# Patient Record
Sex: FEMALE | Race: WHITE | NOT HISPANIC OR LATINO | Employment: FULL TIME | URBAN - NONMETROPOLITAN AREA
[De-identification: names, ages, dates, MRNs, and addresses within clinical notes are randomized per-mention and may not be internally consistent; named-entity substitution may affect disease eponyms.]

---

## 2017-04-28 RX ORDER — CYCLOBENZAPRINE HCL 10 MG
10 TABLET ORAL 2 TIMES DAILY PRN
COMMUNITY
End: 2018-10-25

## 2017-04-28 RX ORDER — PANTOPRAZOLE SODIUM 40 MG/1
40 TABLET, DELAYED RELEASE ORAL DAILY
COMMUNITY

## 2017-04-28 RX ORDER — LOSARTAN POTASSIUM 100 MG/1
100 TABLET ORAL DAILY
COMMUNITY

## 2017-04-28 RX ORDER — ALPRAZOLAM 1 MG/1
1 TABLET ORAL 3 TIMES DAILY PRN
COMMUNITY
End: 2022-05-18

## 2017-04-28 RX ORDER — CARVEDILOL 6.25 MG/1
6.25 TABLET ORAL 2 TIMES DAILY WITH MEALS
COMMUNITY

## 2017-04-28 RX ORDER — ZOLPIDEM TARTRATE 10 MG/1
10 TABLET ORAL NIGHTLY PRN
COMMUNITY

## 2017-04-28 RX ORDER — BUTALBITAL, ACETAMINOPHEN AND CAFFEINE 50; 325; 40 MG/1; MG/1; MG/1
1 TABLET ORAL EVERY 4 HOURS PRN
COMMUNITY
End: 2018-03-22 | Stop reason: ALTCHOICE

## 2017-04-28 RX ORDER — SIMVASTATIN 20 MG
20 TABLET ORAL NIGHTLY
COMMUNITY
End: 2018-10-25

## 2017-04-28 RX ORDER — DULOXETIN HYDROCHLORIDE 60 MG/1
60 CAPSULE, DELAYED RELEASE ORAL DAILY
COMMUNITY
End: 2017-05-01

## 2017-04-28 RX ORDER — MELOXICAM 15 MG/1
15 TABLET ORAL DAILY PRN
Status: ON HOLD | COMMUNITY
End: 2020-10-09

## 2017-04-28 RX ORDER — CETIRIZINE HYDROCHLORIDE 10 MG/1
10 TABLET ORAL DAILY
COMMUNITY

## 2017-05-01 ENCOUNTER — OFFICE VISIT (OUTPATIENT)
Dept: NEUROLOGY | Facility: CLINIC | Age: 42
End: 2017-05-01

## 2017-05-01 VITALS
BODY MASS INDEX: 40.67 KG/M2 | DIASTOLIC BLOOD PRESSURE: 80 MMHG | WEIGHT: 221 LBS | HEART RATE: 72 BPM | SYSTOLIC BLOOD PRESSURE: 120 MMHG | HEIGHT: 62 IN

## 2017-05-01 DIAGNOSIS — G43.119 INTRACTABLE MIGRAINE WITH AURA WITHOUT STATUS MIGRAINOSUS: ICD-10-CM

## 2017-05-01 DIAGNOSIS — Q85.01 NEUROFIBROMATOSIS, PERIPHERAL, NF1 (HCC): ICD-10-CM

## 2017-05-01 DIAGNOSIS — R51.9 CHRONIC DAILY HEADACHE: Primary | ICD-10-CM

## 2017-05-01 PROBLEM — M54.9 CHRONIC BACK PAIN: Status: ACTIVE | Noted: 2017-05-01

## 2017-05-01 PROBLEM — G89.29 CHRONIC BACK PAIN: Status: ACTIVE | Noted: 2017-05-01

## 2017-05-01 PROCEDURE — 99203 OFFICE O/P NEW LOW 30 MIN: CPT | Performed by: PHYSICIAN ASSISTANT

## 2017-05-16 ENCOUNTER — HOSPITAL ENCOUNTER (OUTPATIENT)
Dept: MRI IMAGING | Facility: HOSPITAL | Age: 42
Discharge: HOME | End: 2017-05-16
Admitting: PHYSICIAN ASSISTANT

## 2017-05-16 DIAGNOSIS — R51.9 CHRONIC DAILY HEADACHE: ICD-10-CM

## 2017-05-16 DIAGNOSIS — G43.119 INTRACTABLE MIGRAINE WITH AURA WITHOUT STATUS MIGRAINOSUS: ICD-10-CM

## 2017-05-16 DIAGNOSIS — Q85.01 NEUROFIBROMATOSIS, PERIPHERAL, NF1 (HCC): ICD-10-CM

## 2017-05-16 LAB — CREAT BLDA-MCNC: 0.9 MG/DL (ref 0.6–1.3)

## 2017-05-16 PROCEDURE — 0 GADOBENATE DIMEGLUMINE 529 MG/ML SOLUTION: Performed by: PHYSICIAN ASSISTANT

## 2017-05-16 PROCEDURE — 70553 MRI BRAIN STEM W/O & W/DYE: CPT

## 2017-05-16 PROCEDURE — A9577 INJ MULTIHANCE: HCPCS | Performed by: PHYSICIAN ASSISTANT

## 2017-05-16 PROCEDURE — 82565 ASSAY OF CREATININE: CPT

## 2017-05-16 RX ADMIN — GADOBENATE DIMEGLUMINE 15 ML: 529 INJECTION, SOLUTION INTRAVENOUS at 13:15

## 2017-05-30 ENCOUNTER — OFFICE VISIT (OUTPATIENT)
Dept: NEUROLOGY | Facility: CLINIC | Age: 42
End: 2017-05-30

## 2017-05-30 VITALS
SYSTOLIC BLOOD PRESSURE: 120 MMHG | BODY MASS INDEX: 40.3 KG/M2 | DIASTOLIC BLOOD PRESSURE: 80 MMHG | HEIGHT: 62 IN | WEIGHT: 219 LBS | HEART RATE: 70 BPM

## 2017-05-30 DIAGNOSIS — G43.119 INTRACTABLE MIGRAINE WITH AURA WITHOUT STATUS MIGRAINOSUS: Primary | ICD-10-CM

## 2017-05-30 DIAGNOSIS — C72.30 LOW-GRADE OPTIC PATHWAY GLIOMA (HCC): ICD-10-CM

## 2017-05-30 DIAGNOSIS — Q85.01 NEUROFIBROMATOSIS, PERIPHERAL, NF1 (HCC): ICD-10-CM

## 2017-05-30 DIAGNOSIS — F17.200 CURRENT SMOKER: ICD-10-CM

## 2017-05-30 PROCEDURE — 99214 OFFICE O/P EST MOD 30 MIN: CPT | Performed by: PHYSICIAN ASSISTANT

## 2017-05-30 RX ORDER — TOPIRAMATE 100 MG/1
100 CAPSULE, EXTENDED RELEASE ORAL DAILY
COMMUNITY
End: 2017-05-30 | Stop reason: SDUPTHER

## 2017-05-30 RX ORDER — TOPIRAMATE 100 MG/1
100 CAPSULE, EXTENDED RELEASE ORAL DAILY
Qty: 30 CAPSULE | Refills: 3 | Status: SHIPPED | OUTPATIENT
Start: 2017-05-30 | End: 2017-09-27 | Stop reason: SDUPTHER

## 2017-06-13 ENCOUNTER — TELEPHONE (OUTPATIENT)
Dept: NEUROLOGY | Facility: CLINIC | Age: 42
End: 2017-06-13

## 2017-06-13 NOTE — TELEPHONE ENCOUNTER
I did attempt to call Sandy back but I had to leave a message. She was calling for an update on her Trokendi XR PA.

## 2017-06-14 DIAGNOSIS — Q85.01 NEUROFIBROMATOSIS, PERIPHERAL, NF1 (HCC): ICD-10-CM

## 2017-06-14 DIAGNOSIS — Q85.00 NEUROFIBROMATOSIS (HCC): Primary | ICD-10-CM

## 2017-06-14 NOTE — TELEPHONE ENCOUNTER
I did speak with Sandy and she will come in on Friday to  samples of Trokendi. I did let her know that I will check on the status of her PA. She did voice understanding.

## 2017-08-21 ENCOUNTER — TELEPHONE (OUTPATIENT)
Dept: NEUROLOGY | Facility: CLINIC | Age: 42
End: 2017-08-21

## 2017-08-21 NOTE — TELEPHONE ENCOUNTER
Pt states that her neurologist that she saw in [place] said that she needed a total MRI of the spine done.  She was wondering If that was possible or what yall thought.  Thanks!

## 2017-08-30 ENCOUNTER — HOSPITAL ENCOUNTER (OUTPATIENT)
Dept: MRI IMAGING | Facility: HOSPITAL | Age: 42
Discharge: HOME | End: 2017-08-30
Admitting: PHYSICIAN ASSISTANT

## 2017-08-30 DIAGNOSIS — Q85.01 NEUROFIBROMATOSIS, PERIPHERAL, NF1 (HCC): ICD-10-CM

## 2017-08-30 DIAGNOSIS — C72.30 LOW-GRADE OPTIC PATHWAY GLIOMA (HCC): ICD-10-CM

## 2017-08-30 LAB — CREAT BLDA-MCNC: 0.9 MG/DL (ref 0.6–1.3)

## 2017-08-30 PROCEDURE — 0 GADOBENATE DIMEGLUMINE 529 MG/ML SOLUTION: Performed by: PHYSICIAN ASSISTANT

## 2017-08-30 PROCEDURE — 82565 ASSAY OF CREATININE: CPT

## 2017-08-30 PROCEDURE — 70553 MRI BRAIN STEM W/O & W/DYE: CPT

## 2017-08-30 PROCEDURE — A9577 INJ MULTIHANCE: HCPCS | Performed by: PHYSICIAN ASSISTANT

## 2017-08-30 RX ADMIN — GADOBENATE DIMEGLUMINE 20 ML: 529 INJECTION, SOLUTION INTRAVENOUS at 10:30

## 2017-09-27 DIAGNOSIS — G43.119 INTRACTABLE MIGRAINE WITH AURA WITHOUT STATUS MIGRAINOSUS: ICD-10-CM

## 2017-09-27 RX ORDER — TOPIRAMATE 100 MG/1
100 CAPSULE, EXTENDED RELEASE ORAL DAILY
Qty: 30 CAPSULE | Refills: 3 | Status: SHIPPED | OUTPATIENT
Start: 2017-09-27 | End: 2017-11-27 | Stop reason: SDUPTHER

## 2017-10-18 ENCOUNTER — CLINICAL SUPPORT (OUTPATIENT)
Dept: NEUROLOGY | Facility: CLINIC | Age: 42
End: 2017-10-18

## 2017-10-18 VITALS
HEART RATE: 74 BPM | BODY MASS INDEX: 40.3 KG/M2 | HEIGHT: 62 IN | RESPIRATION RATE: 22 BRPM | WEIGHT: 219 LBS | DIASTOLIC BLOOD PRESSURE: 80 MMHG | SYSTOLIC BLOOD PRESSURE: 120 MMHG

## 2017-10-18 DIAGNOSIS — G43.719 INTRACTABLE CHRONIC MIGRAINE WITHOUT AURA AND WITHOUT STATUS MIGRAINOSUS: Primary | ICD-10-CM

## 2017-10-18 PROCEDURE — 64615 CHEMODENERV MUSC MIGRAINE: CPT | Performed by: PSYCHIATRY & NEUROLOGY

## 2017-10-24 ENCOUNTER — OFFICE VISIT (OUTPATIENT)
Dept: NEUROLOGY | Facility: CLINIC | Age: 42
End: 2017-10-24

## 2017-10-24 VITALS
WEIGHT: 212 LBS | SYSTOLIC BLOOD PRESSURE: 122 MMHG | HEIGHT: 62 IN | HEART RATE: 72 BPM | DIASTOLIC BLOOD PRESSURE: 82 MMHG | BODY MASS INDEX: 39.01 KG/M2

## 2017-10-24 DIAGNOSIS — R51.9 CHRONIC DAILY HEADACHE: ICD-10-CM

## 2017-10-24 DIAGNOSIS — G43.119 INTRACTABLE MIGRAINE WITH AURA WITHOUT STATUS MIGRAINOSUS: Primary | ICD-10-CM

## 2017-10-24 DIAGNOSIS — C72.30 LOW-GRADE OPTIC PATHWAY GLIOMA (HCC): ICD-10-CM

## 2017-10-24 DIAGNOSIS — Q85.01 NEUROFIBROMATOSIS, PERIPHERAL, NF1 (HCC): ICD-10-CM

## 2017-10-24 PROCEDURE — 99214 OFFICE O/P EST MOD 30 MIN: CPT | Performed by: PHYSICIAN ASSISTANT

## 2017-10-24 RX ORDER — DULOXETIN HYDROCHLORIDE 60 MG/1
90 CAPSULE, DELAYED RELEASE ORAL DAILY
COMMUNITY
End: 2021-01-21

## 2017-10-24 RX ORDER — MEMANTINE HYDROCHLORIDE 10 MG/1
10 TABLET ORAL 2 TIMES DAILY
Qty: 60 TABLET | Refills: 3 | Status: SHIPPED | OUTPATIENT
Start: 2017-10-24 | End: 2018-04-02 | Stop reason: SDUPTHER

## 2017-10-29 NOTE — PROGRESS NOTES
Subjective   Sandy Estes is a 42 y.o. female is here today for follow-up.    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  These records and recommendations are reviewed in detail with the patient today.    Migraine    This is a chronic problem. The current episode started more than 1 year ago. The problem occurs intermittently. The problem has been waxing and waning. The pain is located in the bilateral, frontal and retro-orbital region. The pain does not radiate. The pain quality is similar to prior headaches. The quality of the pain is described as aching, throbbing and pulsating. The pain is severe. Associated symptoms include back pain, nausea, phonophobia, photophobia, scalp tenderness, a visual change and vomiting. Pertinent negatives include no coughing, ear pain, fever or weakness. The symptoms are aggravated by activity, bright light, fatigue, emotional stress, noise and Valsalva. She has tried acetaminophen, antidepressants, Excedrin, darkened room and NSAIDs for the symptoms. The treatment provided mild relief. Her past medical history is significant for migraine headaches and migraines in the family.   Headache    This is a chronic problem. The current episode started more than 1 year ago. The problem occurs daily. The problem has been gradually worsening. The pain is located in the bilateral, frontal, parietal, vertex and occipital region. The pain radiates to the left neck and right neck. The pain quality is similar to prior headaches. The quality of the pain is described as aching, stabbing and shooting. The pain is severe. Associated symptoms include back pain, nausea, phonophobia, photophobia, scalp tenderness, a visual change and vomiting. Pertinent negatives include no coughing, ear pain, fever or weakness. The symptoms are aggravated by activity, bright light, fatigue, emotional stress and Valsalva. She has tried acetaminophen,  antidepressants, Excedrin, darkened room, NSAIDs, oxygen, oral narcotics and injectable narcotics for the symptoms. The treatment provided mild relief. Her past medical history is significant for migraine headaches and migraines in the family.       The following portions of the patient's history were reviewed and updated as appropriate: allergies, current medications, past family history, past medical history, past social history, past surgical history and problem list.    Review of Systems   Constitutional: Positive for fatigue. Negative for chills and fever.   HENT: Negative for ear pain, nosebleeds and trouble swallowing.    Eyes: Positive for photophobia and visual disturbance.   Respiratory: Positive for shortness of breath and wheezing. Negative for cough.    Cardiovascular: Negative.    Gastrointestinal: Positive for nausea and vomiting.   Endocrine: Negative.    Genitourinary: Negative.    Musculoskeletal: Positive for back pain and gait problem.   Skin: Negative.         Cutaneous neurofibromas   Allergic/Immunologic: Negative.    Neurological: Positive for headaches. Negative for facial asymmetry, speech difficulty and weakness.   Hematological: Negative.    Psychiatric/Behavioral: Positive for dysphoric mood and sleep disturbance. The patient is nervous/anxious.        Objective   Physical Exam   Constitutional: She is oriented to person, place, and time. Vital signs are normal. She appears well-developed and well-nourished. No distress.   HENT:   Head: Normocephalic and atraumatic.   Right Ear: Hearing and external ear normal.   Left Ear: Hearing and external ear normal.   Nose: Nose normal.   Mouth/Throat: Uvula is midline, oropharynx is clear and moist and mucous membranes are normal.   Eyes: Conjunctivae, EOM and lids are normal. Pupils are equal, round, and reactive to light. No scleral icterus.   Fundoscopic exam:       The right eye shows no hemorrhage and no papilledema. The right eye shows red  reflex.        The left eye shows no hemorrhage and no papilledema. The left eye shows red reflex.   Neck: Normal range of motion and phonation normal. No spinous process tenderness and no muscular tenderness present. Carotid bruit is not present. Normal range of motion present. No thyroid mass and no thyromegaly present.   Cardiovascular: Normal rate, regular rhythm, S1 normal, S2 normal and normal heart sounds.    No murmur heard.  Pulmonary/Chest: Effort normal and breath sounds normal. No stridor. No respiratory distress. She has no wheezes. She has no rales.   Musculoskeletal: She exhibits no edema or tenderness.        Lumbar back: She exhibits decreased range of motion and pain.   Lymphadenopathy:     She has no cervical adenopathy.   Neurological: She is alert and oriented to person, place, and time. She has normal strength and normal reflexes. She displays no atrophy and no tremor. No cranial nerve deficit or sensory deficit. She exhibits normal muscle tone. Coordination and gait normal. GCS eye subscore is 4. GCS verbal subscore is 5. GCS motor subscore is 6.   Reflex Scores:       Tricep reflexes are 2+ on the right side and 2+ on the left side.       Bicep reflexes are 2+ on the right side and 2+ on the left side.       Brachioradialis reflexes are 2+ on the right side and 2+ on the left side.       Patellar reflexes are 2+ on the right side and 2+ on the left side.       Achilles reflexes are 2+ on the right side and 2+ on the left side.  Skin: Skin is warm and dry. No ecchymosis, no lesion and no rash noted. No cyanosis. Nails show no clubbing.   Multiple cutaneous neurofibromas   Psychiatric: She has a normal mood and affect. Her speech is normal and behavior is normal. Judgment and thought content normal. She is not actively hallucinating. Cognition and memory are normal. She is attentive.   Nursing note and vitals reviewed.        Assessment/Plan   Sandy was seen today for migraine.    Diagnoses and  all orders for this visit:    Intractable migraine with aura without status migrainosus  -     memantine (NAMENDA) 10 MG tablet; Take 1 tablet by mouth 2 (Two) Times a Day.    Low-grade optic pathway glioma  -     Ambulatory Referral to Ophthalmology  -     MRI Brain With & Without Contrast; Future  -     MRI Cervical Spine With & Without Contrast; Future    Neurofibromatosis, peripheral, NF1  -     MRI Brain With & Without Contrast; Future  -     MRI Cervical Spine With & Without Contrast; Future    Chronic daily headache    We will add memantine 10 mg twice per day as migraine prophylaxis.    After reviewing the [facility] neurology recommendations as well as the patient's history, we will request an MRI of the brain with and without contrast with thin cuts through the optic chiasm and requests an MRI of the cervical spine with and without contrast.  The patient is also had recommendation for thoracic and lumbar MRI given her history of neurofibromatosis, we will address these MRI orders subsequent to completion of the first two.    We will refer her to ophthalmology and obtain formal visual field testing.    20 minutes of a 25 minute outpatient visit in [facility].        EMR Dragon transcription disclaimer:  Much of this encounter note is an electronic transcription/translation of spoken language to printed text.  The electronic translation of spoken language may permit erroneous, or at times, nonsensical words or phrases to be inadvertently transcribed.  The author has reviewed the note for such errors, however some may still exist.

## 2017-11-17 ENCOUNTER — APPOINTMENT (OUTPATIENT)
Dept: MRI IMAGING | Facility: HOSPITAL | Age: 42
End: 2017-11-17

## 2017-11-22 ENCOUNTER — HOSPITAL ENCOUNTER (OUTPATIENT)
Dept: MRI IMAGING | Facility: HOSPITAL | Age: 42
Discharge: HOME | End: 2017-11-22
Admitting: PHYSICIAN ASSISTANT

## 2017-11-22 ENCOUNTER — HOSPITAL ENCOUNTER (OUTPATIENT)
Dept: MRI IMAGING | Facility: HOSPITAL | Age: 42
Discharge: HOME | End: 2017-11-22

## 2017-11-22 DIAGNOSIS — C72.30 LOW-GRADE OPTIC PATHWAY GLIOMA (HCC): ICD-10-CM

## 2017-11-22 DIAGNOSIS — Q85.01 NEUROFIBROMATOSIS, PERIPHERAL, NF1 (HCC): ICD-10-CM

## 2017-11-22 LAB — CREAT BLDA-MCNC: 1 MG/DL (ref 0.6–1.3)

## 2017-11-22 PROCEDURE — A9577 INJ MULTIHANCE: HCPCS | Performed by: INTERNAL MEDICINE

## 2017-11-22 PROCEDURE — 72156 MRI NECK SPINE W/O & W/DYE: CPT

## 2017-11-22 PROCEDURE — 0 GADOBENATE DIMEGLUMINE 529 MG/ML SOLUTION: Performed by: INTERNAL MEDICINE

## 2017-11-22 PROCEDURE — 82565 ASSAY OF CREATININE: CPT

## 2017-11-22 PROCEDURE — 70553 MRI BRAIN STEM W/O & W/DYE: CPT

## 2017-11-22 RX ADMIN — GADOBENATE DIMEGLUMINE 10 ML: 529 INJECTION, SOLUTION INTRAVENOUS at 13:45

## 2017-11-27 ENCOUNTER — TELEPHONE (OUTPATIENT)
Dept: NEUROLOGY | Facility: CLINIC | Age: 42
End: 2017-11-27

## 2017-11-27 DIAGNOSIS — G43.119 INTRACTABLE MIGRAINE WITH AURA WITHOUT STATUS MIGRAINOSUS: ICD-10-CM

## 2017-11-27 RX ORDER — TOPIRAMATE 100 MG/1
100 CAPSULE, EXTENDED RELEASE ORAL DAILY
Qty: 30 CAPSULE | Refills: 3 | Status: SHIPPED | OUTPATIENT
Start: 2017-11-27 | End: 2017-12-22 | Stop reason: ALTCHOICE

## 2017-11-27 NOTE — TELEPHONE ENCOUNTER
----- Message from Sandy Estes sent at 11/25/2017 11:01 PM CST -----  Regarding: Prescription Question  Contact: [phone]  Need a refill on trokendi XR please     Sandy Estes

## 2017-11-29 ENCOUNTER — TELEPHONE (OUTPATIENT)
Dept: NEUROLOGY | Facility: CLINIC | Age: 42
End: 2017-11-29

## 2017-11-29 NOTE — TELEPHONE ENCOUNTER
I did attempt to return Sandy's call about her Trokendi. I am not sure if she is having a cost issue, but I left a message that I will leave samples up front for her and I left my name and telephone number for her to call at her convenience.

## 2017-12-15 ENCOUNTER — HOSPITAL ENCOUNTER (OUTPATIENT)
Dept: ULTRASOUND IMAGING | Age: 42
Discharge: HOME | End: 2017-12-15
Payer: MEDICAID

## 2017-12-15 ENCOUNTER — HOSPITAL ENCOUNTER (OUTPATIENT)
Dept: WOMENS IMAGING | Age: 42
Discharge: HOME | End: 2017-12-15
Payer: MEDICAID

## 2017-12-15 DIAGNOSIS — N63.20 LEFT BREAST LUMP: ICD-10-CM

## 2017-12-15 PROCEDURE — G0279 TOMOSYNTHESIS, MAMMO: HCPCS

## 2017-12-15 PROCEDURE — 76642 ULTRASOUND BREAST LIMITED: CPT

## 2017-12-22 ENCOUNTER — OFFICE VISIT (OUTPATIENT)
Dept: NEUROLOGY | Facility: CLINIC | Age: 42
End: 2017-12-22

## 2017-12-22 VITALS
DIASTOLIC BLOOD PRESSURE: 80 MMHG | WEIGHT: 216 LBS | HEIGHT: 62 IN | BODY MASS INDEX: 39.75 KG/M2 | HEART RATE: 76 BPM | SYSTOLIC BLOOD PRESSURE: 132 MMHG

## 2017-12-22 DIAGNOSIS — Q85.01 NEUROFIBROMATOSIS, PERIPHERAL, NF1 (HCC): ICD-10-CM

## 2017-12-22 DIAGNOSIS — G43.119 INTRACTABLE MIGRAINE WITH AURA WITHOUT STATUS MIGRAINOSUS: Primary | ICD-10-CM

## 2017-12-22 DIAGNOSIS — C72.30 LOW-GRADE OPTIC PATHWAY GLIOMA (HCC): ICD-10-CM

## 2017-12-22 PROBLEM — H47.42: Status: ACTIVE | Noted: 2017-11-03

## 2017-12-22 PROCEDURE — 99213 OFFICE O/P EST LOW 20 MIN: CPT | Performed by: PHYSICIAN ASSISTANT

## 2017-12-22 RX ORDER — TOPIRAMATE 100 MG/1
100 CAPSULE, EXTENDED RELEASE ORAL DAILY
Qty: 30 CAPSULE | Refills: 11 | Status: SHIPPED | OUTPATIENT
Start: 2017-12-22 | End: 2018-03-22 | Stop reason: DRUGHIGH

## 2017-12-22 RX ORDER — TOPIRAMATE 100 MG/1
CAPSULE, EXTENDED RELEASE ORAL DAILY
COMMUNITY
End: 2017-12-22 | Stop reason: ALTCHOICE

## 2017-12-29 NOTE — PROGRESS NOTES
Subjective   Sandy Estes is a 42 y.o. female is here today for follow-up.    Migraine    This is a chronic problem. The current episode started more than 1 year ago. The problem occurs intermittently. The problem has been waxing and waning. The pain is located in the bilateral, frontal and retro-orbital region. The pain does not radiate. The pain quality is similar to prior headaches. The quality of the pain is described as aching, throbbing and pulsating. The pain is severe. Associated symptoms include back pain, nausea, phonophobia, photophobia, scalp tenderness, a visual change and vomiting. Pertinent negatives include no coughing, ear pain, fever or weakness. The symptoms are aggravated by activity, bright light, fatigue, emotional stress, noise and Valsalva. She has tried acetaminophen, antidepressants, Excedrin, darkened room and NSAIDs for the symptoms. The treatment provided mild relief. Her past medical history is significant for migraine headaches and migraines in the family.   Headache    This is a chronic problem. The current episode started more than 1 year ago. The problem occurs daily. The problem has been gradually worsening. The pain is located in the bilateral, frontal, parietal, vertex and occipital region. The pain radiates to the left neck and right neck. The pain quality is similar to prior headaches. The quality of the pain is described as aching, stabbing and shooting. The pain is severe. Associated symptoms include back pain, nausea, phonophobia, photophobia, scalp tenderness, a visual change and vomiting. Pertinent negatives include no coughing, ear pain, fever or weakness. The symptoms are aggravated by activity, bright light, fatigue, emotional stress and Valsalva. She has tried acetaminophen, antidepressants, Excedrin, darkened room, NSAIDs, oxygen, oral narcotics and injectable narcotics for the symptoms. The treatment provided mild relief. Her past medical history is significant for  migraine headaches and migraines in the family.       The following portions of the patient's history were reviewed and updated as appropriate: allergies, current medications, past family history, past medical history, past social history, past surgical history and problem list.    Review of Systems   Constitutional: Positive for fatigue. Negative for chills and fever.   HENT: Negative for ear pain, nosebleeds and trouble swallowing.    Eyes: Positive for photophobia and visual disturbance.   Respiratory: Positive for shortness of breath and wheezing. Negative for cough.    Cardiovascular: Negative.    Gastrointestinal: Positive for nausea and vomiting.   Endocrine: Negative.    Genitourinary: Negative.    Musculoskeletal: Positive for back pain and gait problem.   Skin: Negative.         Cutaneous neurofibromas   Allergic/Immunologic: Negative.    Neurological: Positive for headaches. Negative for facial asymmetry, speech difficulty and weakness.   Hematological: Negative.    Psychiatric/Behavioral: Positive for dysphoric mood and sleep disturbance. The patient is nervous/anxious.        Objective   Physical Exam   Constitutional: She is oriented to person, place, and time. Vital signs are normal. She appears well-developed and well-nourished. No distress.   HENT:   Head: Normocephalic and atraumatic.   Right Ear: Hearing and external ear normal.   Left Ear: Hearing and external ear normal.   Nose: Nose normal.   Mouth/Throat: Uvula is midline, oropharynx is clear and moist and mucous membranes are normal.   Eyes: Conjunctivae, EOM and lids are normal. Pupils are equal, round, and reactive to light. No scleral icterus.   Fundoscopic exam:       The right eye shows no hemorrhage and no papilledema. The right eye shows red reflex.        The left eye shows no hemorrhage and no papilledema. The left eye shows red reflex.   Neck: Normal range of motion and phonation normal. No spinous process tenderness and no muscular  tenderness present. Carotid bruit is not present. Normal range of motion present. No thyroid mass and no thyromegaly present.   Cardiovascular: Normal rate, regular rhythm, S1 normal, S2 normal and normal heart sounds.    No murmur heard.  Pulmonary/Chest: Effort normal and breath sounds normal. No stridor. No respiratory distress. She has no wheezes. She has no rales.   Musculoskeletal: She exhibits no edema or tenderness.        Lumbar back: She exhibits decreased range of motion and pain.   Lymphadenopathy:     She has no cervical adenopathy.   Neurological: She is alert and oriented to person, place, and time. She has normal strength and normal reflexes. She displays no atrophy and no tremor. No cranial nerve deficit or sensory deficit. She exhibits normal muscle tone. Coordination and gait normal. GCS eye subscore is 4. GCS verbal subscore is 5. GCS motor subscore is 6.   Reflex Scores:       Tricep reflexes are 2+ on the right side and 2+ on the left side.       Bicep reflexes are 2+ on the right side and 2+ on the left side.       Brachioradialis reflexes are 2+ on the right side and 2+ on the left side.       Patellar reflexes are 2+ on the right side and 2+ on the left side.       Achilles reflexes are 2+ on the right side and 2+ on the left side.  Skin: Skin is warm and dry. No ecchymosis, no lesion and no rash noted. No cyanosis. Nails show no clubbing.   Multiple cutaneous neurofibromas   Psychiatric: She has a normal mood and affect. Her speech is normal and behavior is normal. Judgment and thought content normal. She is not actively hallucinating. Cognition and memory are normal. She is attentive.   Nursing note and vitals reviewed.        Assessment/Plan   Sandy was seen today for migraine.    Diagnoses and all orders for this visit:    Intractable migraine with aura without status migrainosus  -     Topiramate ER (TROKENDI XR) 100 MG capsule sustained-release 24 hr; Take 100 mg by mouth  Daily.    Neurofibromatosis, peripheral, NF1    Low-grade optic pathway glioma    The patient has had adverse effects to regular release topiramate, Qudexy extended release topiramate but has relief and no suck side effects brand name Trokendi.  We would like to convert her back to brand name Trokendi which provides relief without adverse effects.    Neurofibromatosis is stable.    The patient is on a scheduled long-term monitoring for low-grade optic pathway glioma associated with her neurofibromatosis.    10 minutes of a 15 minute outpatient visit was spent in counseling and coordination of care today.        Patient's BMI is above normal parameters. Follow-up plan includes:  exercise counseling and nutrition counseling.    EMR Dragon transcription disclaimer:  Much of this encounter note is an electronic transcription/translation of spoken language to printed text.  The electronic translation of spoken language may permit erroneous, or at times, nonsensical words or phrases to be inadvertently transcribed.  The author has reviewed the note for such errors, however some may still exist.

## 2018-01-04 ENCOUNTER — PRIOR AUTHORIZATION (OUTPATIENT)
Dept: NEUROLOGY | Facility: CLINIC | Age: 43
End: 2018-01-04

## 2018-01-10 ENCOUNTER — PROCEDURE VISIT (OUTPATIENT)
Dept: NEUROLOGY | Facility: CLINIC | Age: 43
End: 2018-01-10

## 2018-01-10 DIAGNOSIS — G43.119 INTRACTABLE MIGRAINE WITH AURA WITHOUT STATUS MIGRAINOSUS: Primary | ICD-10-CM

## 2018-01-10 PROCEDURE — 64615 CHEMODENERV MUSC MIGRAINE: CPT | Performed by: PSYCHIATRY & NEUROLOGY

## 2018-01-10 NOTE — PROGRESS NOTES
Procedure Note:  Sandy Estes  01/10/2018    Procedure:  Botulinum Toxin Injection  Indication for Procedure: Chronic Migraines    The risks and benefits were explained to the patient including local infection, mild bleeding, paralysis of muscles, and possible allergic reaction.  The patient expressed understanding and informed consent was obtained.    Initial Headache Frequency: 30  Current Headache Frequency:2    Initial Duration (hours): 24  Current Duration (hours): 6     10 Units divided in 2 sites: 5 Units Right, 5 Units Left  Procerus: 5 Units  Frontalis 20 units divided in 4 sites: 10 Units Right, 10 Units Left  Temporalis 40 Units divided in 8 sites: 20 Units Right, 20 Units Left  Occipitalis 30 Units divided in 6 sites: 15 Units Right, 15 Units Left  Cervical Paraspinal 20 Units divided in 4 sites: 10 Units Right, 10 Units Left  Trapezius 30 Units divided in 6 sites: 15 Units Right, 15 Units Left              200 units were mixed in total with 45 units being discarded.    The patient tolerated the procedure well with no complications and no blood loss.    Follow up for repeat injections in 12 weeks.    Scribed for Freddy Barkley MD by Kim Daley MA. 1/10/2018  1:36 PM    I performed the procedure myself and agree with documentation above.    Freddy Barkley MD  01/10/18

## 2018-01-31 ENCOUNTER — OFFICE VISIT (OUTPATIENT)
Dept: SURGERY | Age: 43
End: 2018-01-31
Payer: MEDICAID

## 2018-01-31 VITALS
HEART RATE: 78 BPM | SYSTOLIC BLOOD PRESSURE: 138 MMHG | BODY MASS INDEX: 39.16 KG/M2 | HEIGHT: 62 IN | WEIGHT: 212.8 LBS | DIASTOLIC BLOOD PRESSURE: 78 MMHG | RESPIRATION RATE: 20 BRPM

## 2018-01-31 DIAGNOSIS — N60.19 FIBROCYSTIC BREAST DISEASE (FCBD), UNSPECIFIED LATERALITY: ICD-10-CM

## 2018-01-31 DIAGNOSIS — Z80.3 FAMILY HISTORY OF BREAST CANCER: Primary | ICD-10-CM

## 2018-01-31 DIAGNOSIS — N63.0 LUMP OR MASS IN BREAST: ICD-10-CM

## 2018-01-31 PROCEDURE — 1036F TOBACCO NON-USER: CPT | Performed by: SURGERY

## 2018-01-31 PROCEDURE — G8417 CALC BMI ABV UP PARAM F/U: HCPCS | Performed by: SURGERY

## 2018-01-31 PROCEDURE — 99203 OFFICE O/P NEW LOW 30 MIN: CPT | Performed by: SURGERY

## 2018-01-31 PROCEDURE — G8428 CUR MEDS NOT DOCUMENT: HCPCS | Performed by: SURGERY

## 2018-01-31 PROCEDURE — G8484 FLU IMMUNIZE NO ADMIN: HCPCS | Performed by: SURGERY

## 2018-01-31 RX ORDER — LOSARTAN POTASSIUM 100 MG/1
100 TABLET ORAL
COMMUNITY

## 2018-01-31 RX ORDER — MEMANTINE HYDROCHLORIDE 10 MG/1
10 TABLET ORAL 2 TIMES DAILY
COMMUNITY

## 2018-01-31 RX ORDER — PANTOPRAZOLE SODIUM 40 MG/1
40 TABLET, DELAYED RELEASE ORAL
COMMUNITY

## 2018-01-31 RX ORDER — ALPRAZOLAM 0.5 MG/1
0.5 TABLET ORAL
COMMUNITY

## 2018-01-31 RX ORDER — DULOXETIN HYDROCHLORIDE 60 MG/1
60 CAPSULE, DELAYED RELEASE ORAL
COMMUNITY

## 2018-01-31 RX ORDER — CARVEDILOL 6.25 MG/1
6.25 TABLET ORAL
COMMUNITY

## 2018-02-01 PROBLEM — N60.19 DIFFUSE CYSTIC MASTOPATHY: Status: ACTIVE | Noted: 2018-02-01

## 2018-02-01 PROBLEM — N63.0 LUMP OR MASS IN BREAST: Status: ACTIVE | Noted: 2018-02-01

## 2018-03-07 DIAGNOSIS — G43.711 INTRACTABLE CHRONIC MIGRAINE WITHOUT AURA AND WITH STATUS MIGRAINOSUS: Primary | ICD-10-CM

## 2018-03-22 ENCOUNTER — OFFICE VISIT (OUTPATIENT)
Dept: NEUROLOGY | Facility: CLINIC | Age: 43
End: 2018-03-22

## 2018-03-22 VITALS
HEIGHT: 62 IN | DIASTOLIC BLOOD PRESSURE: 84 MMHG | WEIGHT: 212 LBS | SYSTOLIC BLOOD PRESSURE: 124 MMHG | HEART RATE: 76 BPM | BODY MASS INDEX: 39.01 KG/M2

## 2018-03-22 DIAGNOSIS — C72.30 LOW-GRADE OPTIC PATHWAY GLIOMA (HCC): ICD-10-CM

## 2018-03-22 DIAGNOSIS — G43.119 INTRACTABLE MIGRAINE WITH AURA WITHOUT STATUS MIGRAINOSUS: Primary | ICD-10-CM

## 2018-03-22 DIAGNOSIS — Q85.01 NEUROFIBROMATOSIS, PERIPHERAL, NF1 (HCC): ICD-10-CM

## 2018-03-22 DIAGNOSIS — R51.9 CHRONIC DAILY HEADACHE: ICD-10-CM

## 2018-03-22 PROCEDURE — 99214 OFFICE O/P EST MOD 30 MIN: CPT | Performed by: PHYSICIAN ASSISTANT

## 2018-03-22 RX ORDER — TOPIRAMATE 200 MG/1
200 CAPSULE, EXTENDED RELEASE ORAL DAILY
Qty: 30 CAPSULE | Refills: 6 | Status: SHIPPED | OUTPATIENT
Start: 2018-03-22 | End: 2018-11-26 | Stop reason: SDUPTHER

## 2018-03-23 NOTE — PROGRESS NOTES
Subjective   Sandy Estes is a 42 y.o. female is here today for follow-up.    Migraine    This is a chronic problem. The current episode started more than 1 year ago. The problem occurs intermittently. The problem has been waxing and waning. The pain is located in the bilateral, frontal and retro-orbital region. The pain does not radiate. The pain quality is similar to prior headaches. The quality of the pain is described as aching, throbbing and pulsating. The pain is severe. Associated symptoms include back pain, dizziness, neck pain, phonophobia, photophobia, scalp tenderness and vomiting. Pertinent negatives include no abdominal pain, coughing, ear pain, fever, loss of balance, nausea, visual change or weakness. The symptoms are aggravated by activity, bright light, fatigue, emotional stress, noise and Valsalva. She has tried acetaminophen, antidepressants, Excedrin, darkened room and NSAIDs for the symptoms. The treatment provided mild relief. Her past medical history is significant for migraine headaches and migraines in the family.   Headache    This is a chronic problem. The current episode started more than 1 year ago. The problem occurs daily. The problem has been gradually worsening. The pain is located in the bilateral, frontal, parietal, vertex and occipital region. The pain radiates to the left neck and right neck. The pain quality is similar to prior headaches. The quality of the pain is described as aching, stabbing and shooting. The pain is severe. Associated symptoms include back pain, dizziness, neck pain, phonophobia, photophobia, scalp tenderness and vomiting. Pertinent negatives include no abdominal pain, coughing, ear pain, fever, loss of balance, nausea, visual change or weakness. The symptoms are aggravated by activity, bright light, fatigue, emotional stress and Valsalva. She has tried acetaminophen, antidepressants, Excedrin, darkened room, NSAIDs, oxygen, oral narcotics and injectable  narcotics for the symptoms. The treatment provided mild relief. Her past medical history is significant for migraine headaches and migraines in the family.       The following portions of the patient's history were reviewed and updated as appropriate: allergies, current medications, past family history, past medical history, past social history, past surgical history and problem list.    Review of Systems   Constitutional: Positive for fatigue. Negative for diaphoresis and fever.   HENT: Negative for congestion, ear pain, nosebleeds and trouble swallowing.    Eyes: Positive for photophobia and visual disturbance.   Respiratory: Positive for wheezing. Negative for cough and shortness of breath.    Cardiovascular: Negative.  Negative for chest pain, palpitations and near-syncope.   Gastrointestinal: Positive for vomiting. Negative for abdominal pain, bowel incontinence and nausea.   Endocrine: Negative.    Genitourinary: Negative.  Negative for bladder incontinence.   Musculoskeletal: Positive for back pain, gait problem and neck pain.   Skin: Negative.         Cutaneous neurofibromas   Allergic/Immunologic: Negative.    Neurological: Positive for dizziness, vertigo and headaches. Negative for focal weakness, syncope, facial asymmetry, weakness, light-headedness and loss of balance.   Hematological: Negative.    Psychiatric/Behavioral: Positive for dysphoric mood and sleep disturbance. Negative for confusion and memory loss. The patient is nervous/anxious.        Objective   Physical Exam   Constitutional: She is oriented to person, place, and time. Vital signs are normal. She appears well-developed and well-nourished. No distress.   HENT:   Head: Normocephalic and atraumatic.   Right Ear: Hearing and external ear normal.   Left Ear: Hearing and external ear normal.   Nose: Nose normal.   Mouth/Throat: Uvula is midline, oropharynx is clear and moist and mucous membranes are normal.   Eyes: Conjunctivae, EOM and lids  are normal. Pupils are equal, round, and reactive to light. No scleral icterus.   Fundoscopic exam:       The right eye shows no hemorrhage and no papilledema. The right eye shows red reflex.        The left eye shows no hemorrhage and no papilledema. The left eye shows red reflex.   Neck: Normal range of motion and phonation normal. No spinous process tenderness and no muscular tenderness present. Carotid bruit is not present. Normal range of motion present. No thyroid mass and no thyromegaly present.   Cardiovascular: Normal rate, regular rhythm, S1 normal, S2 normal and normal heart sounds.    No murmur heard.  Pulmonary/Chest: Effort normal and breath sounds normal. No stridor. No respiratory distress. She has no wheezes. She has no rales.   Musculoskeletal: She exhibits no edema or tenderness.        Lumbar back: She exhibits decreased range of motion and pain.   Lymphadenopathy:     She has no cervical adenopathy.   Neurological: She is alert and oriented to person, place, and time. She has normal strength and normal reflexes. She displays no atrophy and no tremor. No cranial nerve deficit or sensory deficit. She exhibits normal muscle tone. Coordination and gait normal. GCS eye subscore is 4. GCS verbal subscore is 5. GCS motor subscore is 6.   Reflex Scores:       Tricep reflexes are 2+ on the right side and 2+ on the left side.       Bicep reflexes are 2+ on the right side and 2+ on the left side.       Brachioradialis reflexes are 2+ on the right side and 2+ on the left side.       Patellar reflexes are 2+ on the right side and 2+ on the left side.       Achilles reflexes are 2+ on the right side and 2+ on the left side.  Skin: Skin is warm and dry. No ecchymosis, no lesion and no rash noted. No cyanosis. Nails show no clubbing.   Multiple cutaneous neurofibromas   Psychiatric: She has a normal mood and affect. Her speech is normal and behavior is normal. Judgment and thought content normal. She is not  actively hallucinating. Cognition and memory are normal. She is attentive.   Nursing note and vitals reviewed.        Assessment/Plan   Sandy was seen today for migraine.    Diagnoses and all orders for this visit:    Intractable migraine with aura without status migrainosus  -     TROKENDI  MG capsule sustained-release 24 hr; Take 200 mg by mouth Daily.    Low-grade optic pathway glioma    Neurofibromatosis, peripheral, NF1    Chronic daily headache      We will increase Trokendi to 200 mg daily.  The patient had some paradoxical headache after her last Botox injections and will hold these for now.    No other changes were made in her treatment regimen today.    20 minutes of a 25 minute outpatient visit was spent in counseling and coordination of care today.    Dictated utilizing Dragon dictation.

## 2018-04-02 DIAGNOSIS — G43.119 INTRACTABLE MIGRAINE WITH AURA WITHOUT STATUS MIGRAINOSUS: ICD-10-CM

## 2018-04-02 RX ORDER — MEMANTINE HYDROCHLORIDE 10 MG/1
10 TABLET ORAL 2 TIMES DAILY
Qty: 60 TABLET | Refills: 3 | Status: SHIPPED | OUTPATIENT
Start: 2018-04-02 | End: 2018-09-12 | Stop reason: SDUPTHER

## 2018-04-02 NOTE — TELEPHONE ENCOUNTER
----- Message from Sandy Estes sent at 4/2/2018  2:32 PM CDT -----  Regarding: Prescription Question  Contact: [phone]  I was needing a refill on my Namenda please

## 2018-05-15 ENCOUNTER — TELEPHONE (OUTPATIENT)
Dept: NEUROLOGY | Facility: CLINIC | Age: 43
End: 2018-05-15

## 2018-05-15 DIAGNOSIS — G43.119 INTRACTABLE MIGRAINE WITH AURA WITHOUT STATUS MIGRAINOSUS: Primary | ICD-10-CM

## 2018-05-15 DIAGNOSIS — Q85.01 NEUROFIBROMATOSIS, PERIPHERAL, NF1 (HCC): ICD-10-CM

## 2018-05-15 DIAGNOSIS — C72.30 LOW-GRADE OPTIC PATHWAY GLIOMA (HCC): ICD-10-CM

## 2018-05-15 NOTE — TELEPHONE ENCOUNTER
Sandy said she has been seeing flashes of light and she has had some vision changes.  She had her eyes checked and said they didn't find any reason for her vision changes.

## 2018-05-16 RX ORDER — AMLODIPINE BESYLATE 5 MG/1
5 TABLET ORAL DAILY
Qty: 30 TABLET | Refills: 3 | Status: SHIPPED | OUTPATIENT
Start: 2018-05-16 | End: 2018-09-24 | Stop reason: SDUPTHER

## 2018-05-16 NOTE — TELEPHONE ENCOUNTER
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  She also has migraine headaches, and going to send a prescription for Norvasc 5 mg once daily.  I was not able to talk to her on Wednesday night.

## 2018-05-25 ENCOUNTER — HOSPITAL ENCOUNTER (OUTPATIENT)
Dept: MRI IMAGING | Facility: HOSPITAL | Age: 43
Discharge: HOME | End: 2018-05-25
Admitting: PHYSICIAN ASSISTANT

## 2018-05-25 DIAGNOSIS — C72.30 LOW-GRADE OPTIC PATHWAY GLIOMA (HCC): ICD-10-CM

## 2018-05-25 DIAGNOSIS — Q85.01 NEUROFIBROMATOSIS, PERIPHERAL, NF1 (HCC): ICD-10-CM

## 2018-05-25 PROCEDURE — 0 GADOBENATE DIMEGLUMINE 529 MG/ML SOLUTION: Performed by: PHYSICIAN ASSISTANT

## 2018-05-25 PROCEDURE — 82565 ASSAY OF CREATININE: CPT

## 2018-05-25 PROCEDURE — 70553 MRI BRAIN STEM W/O & W/DYE: CPT

## 2018-05-25 PROCEDURE — A9577 INJ MULTIHANCE: HCPCS | Performed by: PHYSICIAN ASSISTANT

## 2018-05-25 RX ADMIN — GADOBENATE DIMEGLUMINE 20 ML: 529 INJECTION, SOLUTION INTRAVENOUS at 10:30

## 2018-05-29 LAB — CREAT BLDA-MCNC: 0.9 MG/DL (ref 0.6–1.3)

## 2018-07-06 ENCOUNTER — OFFICE VISIT (OUTPATIENT)
Dept: SURGERY | Age: 43
End: 2018-07-06
Payer: MEDICAID

## 2018-07-06 VITALS — SYSTOLIC BLOOD PRESSURE: 100 MMHG | DIASTOLIC BLOOD PRESSURE: 60 MMHG | HEART RATE: 74 BPM

## 2018-07-06 DIAGNOSIS — Z12.39 SCREENING BREAST EXAMINATION: Primary | ICD-10-CM

## 2018-07-06 PROCEDURE — G8417 CALC BMI ABV UP PARAM F/U: HCPCS | Performed by: PHYSICIAN ASSISTANT

## 2018-07-06 PROCEDURE — 1036F TOBACCO NON-USER: CPT | Performed by: PHYSICIAN ASSISTANT

## 2018-07-06 PROCEDURE — G8427 DOCREV CUR MEDS BY ELIG CLIN: HCPCS | Performed by: PHYSICIAN ASSISTANT

## 2018-07-06 PROCEDURE — 99212 OFFICE O/P EST SF 10 MIN: CPT | Performed by: PHYSICIAN ASSISTANT

## 2018-07-17 NOTE — PROGRESS NOTES
HPI:  Avelina Hernandez is in for 3 month follow-up breast check for a questionable palpable abnormality. She has not noticed any changes in her breasts. BREAST EXAM:  On examination, she has fibrocystic changes throughout both breasts, no dominant masses, no skin or nipple changes and no axillary adenopathy. I see nothing suspicious for breast cancer. ASSESSMENT:  Normal breast exam     PLAN:  I will plan to see her back in December 2018 for physical exam and bilateral mammograms. She will contact me if anything significant changes.

## 2018-09-12 DIAGNOSIS — G43.119 INTRACTABLE MIGRAINE WITH AURA WITHOUT STATUS MIGRAINOSUS: ICD-10-CM

## 2018-09-12 RX ORDER — MEMANTINE HYDROCHLORIDE 10 MG/1
10 TABLET ORAL 2 TIMES DAILY
Qty: 60 TABLET | Refills: 0 | Status: SHIPPED | OUTPATIENT
Start: 2018-09-12 | End: 2018-10-22 | Stop reason: SDUPTHER

## 2018-09-24 DIAGNOSIS — G43.119 INTRACTABLE MIGRAINE WITH AURA WITHOUT STATUS MIGRAINOSUS: ICD-10-CM

## 2018-09-24 RX ORDER — AMLODIPINE BESYLATE 5 MG/1
TABLET ORAL
Qty: 30 TABLET | Refills: 0 | Status: SHIPPED | OUTPATIENT
Start: 2018-09-24 | End: 2018-10-22 | Stop reason: SDUPTHER

## 2018-10-22 DIAGNOSIS — G43.119 INTRACTABLE MIGRAINE WITH AURA WITHOUT STATUS MIGRAINOSUS: ICD-10-CM

## 2018-10-22 RX ORDER — MEMANTINE HYDROCHLORIDE 10 MG/1
TABLET ORAL
Qty: 60 TABLET | Refills: 0 | Status: SHIPPED | OUTPATIENT
Start: 2018-10-22 | End: 2018-11-26 | Stop reason: SDUPTHER

## 2018-10-22 RX ORDER — AMLODIPINE BESYLATE 5 MG/1
TABLET ORAL
Qty: 30 TABLET | Refills: 0 | Status: SHIPPED | OUTPATIENT
Start: 2018-10-22 | End: 2018-11-26 | Stop reason: SDUPTHER

## 2018-10-25 ENCOUNTER — OFFICE VISIT (OUTPATIENT)
Dept: NEUROLOGY | Facility: CLINIC | Age: 43
End: 2018-10-25

## 2018-10-25 VITALS
SYSTOLIC BLOOD PRESSURE: 112 MMHG | HEART RATE: 61 BPM | WEIGHT: 202 LBS | DIASTOLIC BLOOD PRESSURE: 74 MMHG | BODY MASS INDEX: 37.17 KG/M2 | HEIGHT: 62 IN

## 2018-10-25 DIAGNOSIS — H47.42: ICD-10-CM

## 2018-10-25 DIAGNOSIS — Q85.00 NEUROFIBROMATOSIS (HCC): ICD-10-CM

## 2018-10-25 DIAGNOSIS — G43.119 INTRACTABLE MIGRAINE WITH AURA WITHOUT STATUS MIGRAINOSUS: Primary | ICD-10-CM

## 2018-10-25 PROCEDURE — 99213 OFFICE O/P EST LOW 20 MIN: CPT | Performed by: PHYSICIAN ASSISTANT

## 2018-10-25 RX ORDER — FENOFIBRATE 54 MG/1
1 TABLET ORAL DAILY
COMMUNITY
Start: 2018-09-28 | End: 2023-01-09 | Stop reason: DRUGHIGH

## 2018-11-26 DIAGNOSIS — G43.119 INTRACTABLE MIGRAINE WITH AURA WITHOUT STATUS MIGRAINOSUS: ICD-10-CM

## 2018-11-27 RX ORDER — AMLODIPINE BESYLATE 5 MG/1
TABLET ORAL
Qty: 30 TABLET | Refills: 5 | Status: SHIPPED | OUTPATIENT
Start: 2018-11-27 | End: 2019-05-29 | Stop reason: SDUPTHER

## 2018-11-27 RX ORDER — MEMANTINE HYDROCHLORIDE 10 MG/1
TABLET ORAL
Qty: 60 TABLET | Refills: 5 | Status: SHIPPED | OUTPATIENT
Start: 2018-11-27 | End: 2020-11-06 | Stop reason: SDUPTHER

## 2018-11-27 RX ORDER — TOPIRAMATE 200 MG/1
CAPSULE, EXTENDED RELEASE ORAL
Qty: 30 CAPSULE | Refills: 5 | Status: SHIPPED | OUTPATIENT
Start: 2018-11-27 | End: 2019-05-06 | Stop reason: SDUPTHER

## 2018-12-17 ENCOUNTER — HOSPITAL ENCOUNTER (OUTPATIENT)
Dept: WOMENS IMAGING | Age: 43
Discharge: HOME | End: 2018-12-17
Payer: MEDICAID

## 2018-12-17 ENCOUNTER — OFFICE VISIT (OUTPATIENT)
Dept: SURGERY | Age: 43
End: 2018-12-17
Payer: MEDICAID

## 2018-12-17 VITALS — DIASTOLIC BLOOD PRESSURE: 70 MMHG | SYSTOLIC BLOOD PRESSURE: 100 MMHG | HEART RATE: 72 BPM

## 2018-12-17 DIAGNOSIS — Z12.39 SCREENING BREAST EXAMINATION: ICD-10-CM

## 2018-12-17 DIAGNOSIS — Z12.39 SCREENING BREAST EXAMINATION: Primary | ICD-10-CM

## 2018-12-17 LAB — HEREDITARY CANCER TEST-MYRIAD: NORMAL

## 2018-12-17 PROCEDURE — G8417 CALC BMI ABV UP PARAM F/U: HCPCS | Performed by: PHYSICIAN ASSISTANT

## 2018-12-17 PROCEDURE — G8484 FLU IMMUNIZE NO ADMIN: HCPCS | Performed by: PHYSICIAN ASSISTANT

## 2018-12-17 PROCEDURE — G8427 DOCREV CUR MEDS BY ELIG CLIN: HCPCS | Performed by: PHYSICIAN ASSISTANT

## 2018-12-17 PROCEDURE — 99212 OFFICE O/P EST SF 10 MIN: CPT | Performed by: PHYSICIAN ASSISTANT

## 2018-12-17 PROCEDURE — 1036F TOBACCO NON-USER: CPT | Performed by: PHYSICIAN ASSISTANT

## 2018-12-17 PROCEDURE — 77063 BREAST TOMOSYNTHESIS BI: CPT

## 2019-01-22 ENCOUNTER — TELEPHONE (OUTPATIENT)
Dept: SURGERY | Age: 44
End: 2019-01-22

## 2019-05-06 DIAGNOSIS — G43.119 INTRACTABLE MIGRAINE WITH AURA WITHOUT STATUS MIGRAINOSUS: ICD-10-CM

## 2019-05-06 RX ORDER — TOPIRAMATE 200 MG/1
1 CAPSULE, EXTENDED RELEASE ORAL DAILY
Qty: 30 CAPSULE | Refills: 5 | Status: SHIPPED | OUTPATIENT
Start: 2019-05-06 | End: 2019-11-27 | Stop reason: SDUPTHER

## 2019-05-22 ENCOUNTER — NURSE TRIAGE (OUTPATIENT)
Dept: CALL CENTER | Facility: HOSPITAL | Age: 44
End: 2019-05-22

## 2019-05-22 NOTE — TELEPHONE ENCOUNTER
"Recent dosage changes to cymbalta and rexulti which are causing her to feel jittery and she cannot go to sleep.  There are interactions listed in [facility] with these two drugs.  Advised to change the time of day she is taking these medications and not to take them at the same time.  She plans to take the cymbalta in the AM and rexulti in the PM.    Reason for Disposition  • Caller has medication question, adult has minor symptoms, caller declines triage, and triager answers question    Additional Information  • Negative: Drug overdose and nurse unable to answer question  • Negative: Caller requesting information not related to medicine  • Negative: Caller requesting a prescription for Strep throat and has a positive culture result  • Negative: Rash while taking a medication or within 3 days of stopping it  • Negative: Immunization reaction suspected  • Negative: [1] Asthma and [2] having symptoms of asthma (cough, wheezing, etc)  • Negative: MORE THAN A DOUBLE DOSE of a prescription or over-the-counter (OTC) drug  • Negative: [1] DOUBLE DOSE (an extra dose or lesser amount) of over-the-counter (OTC) drug AND [2] any symptoms (e.g., dizziness, nausea, pain, sleepiness)  • Negative: [1] DOUBLE DOSE (an extra dose or lesser amount) of prescription drug AND [2] any symptoms (e.g., dizziness, nausea, pain, sleepiness)  • Negative: Took another person's prescription drug  • Negative: [1] DOUBLE DOSE (an extra dose or lesser amount) of prescription drug AND [2] NO symptoms (Exception: a double dose of antibiotics)  • Negative: Diabetes drug error or overdose (e.g., insulin or extra dose)  • Negative: [1] Request for URGENT new prescription or refill of \"essential\" medication (i.e., likelihood of harm to patient if not taken) AND [2] triager unable to fill per unit policy  • Negative: [1] Prescription not at pharmacy AND [2] was prescribed today by PCP  • Negative: Pharmacy calling with prescription questions and triager " "unable to answer question  • Negative: Caller has URGENT medication question about med that PCP prescribed and triager unable to answer question  • Negative: Caller has NON-URGENT medication question about med that PCP prescribed and triager unable to answer question  • Negative: Caller requesting a NON-URGENT new prescription or refill and triager unable to refill per unit policy  • Negative: Caller has medication question about med not prescribed by PCP and triager unable to answer question (e.g., compatibility with other med, storage)  • Negative: [1] DOUBLE DOSE (an extra dose or lesser amount) of over-the-counter (OTC) drug AND [2] NO symptoms  • Negative: [1] DOUBLE DOSE (an extra dose or lesser amount) of antibiotic drug AND [2] NO symptoms  • Negative: Caller has medication question only, adult not sick, and triager answers question  • Negative: Caller requesting information about medication during pregnancy; adult is not ill and triager answers question  • Negative: Caller requesting information about medication use with breastfeeding; neither adult nor infant is ill, and triager answers question  • Negative: Caller requesting a refill, no triage required, and triager able to refill per unit policy  • Negative: Pharmacy calling with prescription question and triager answers question    Answer Assessment - Initial Assessment Questions  1. SYMPTOMS: \"Do you have any symptoms?\"      Anxious and insomnia  2. SEVERITY: If symptoms are present, ask \"Are they mild, moderate or severe?\"      moderate    Protocols used: MEDICATION QUESTION CALL-ADULT-      "

## 2019-05-24 ENCOUNTER — NURSE TRIAGE (OUTPATIENT)
Dept: CALL CENTER | Facility: HOSPITAL | Age: 44
End: 2019-05-24

## 2019-05-25 NOTE — TELEPHONE ENCOUNTER
"Reviewed guideline with caller, advises Ms. Estes be seen in ED. Caller agrees to follow care advice.     Reason for Disposition  • [1] Bipolar disorder (manic depression) AND [2] unable to do any of normal activities (e.g., self care, school, work; in comparison to baseline).    Additional Information  • Negative: Patient attempted suicide  • Negative: Patient is threatening suicide now  • Negative: Patient is threatening to kill a specific person  • Negative: [1] Patient is very confused (disoriented, slurred speech) AND [2] no other adult (e.g., friend or family member) available  • Negative: [1] Difficult to awaken or acting very confused (disoriented, slurred speech) AND [2] new onset  • Negative: Drug overdose suspected  • Negative: Sounds like a life-threatening emergency to the triager  • Negative: Alcohol use, abuse or dependence, question or problem related to  • Negative: Drug abuse or dependence, question or problem related to    Answer Assessment - Initial Assessment Questions  1. CONCERN: \"What happened that made you call today?\"      Feeling jittery, anxious  2. BIPOLAR SYMPTOM SCREENING: \"How are you feeling overall, is your bipolar disorder under good control?\"    - ROBERTO SYMPTOMS (e.g., increased energy, decreased sleeping, hyperactivity, grandiosity)     - DEPRESSION SYMPTOMS (e.g., decreased energy, increased sleeping or difficulty sleeping, difficulty concentrating, feelings of sadness, guilt, hopelessness, or worthlessness)      Roberto symptoms  3. RISK OF HARM - SUICIDAL IDEATION:  \"Do you ever have thoughts of hurting or killing yourself?\"  (e.g., yes, no, no but preoccupation with thoughts about death)    - INTENT:  \"Do you have thoughts of hurting or killing yourself right NOW?\" (e.g., yes, no, N/A)    - PLAN: \"Do you have a specific plan for how you would do this?\" (e.g., gun, knife, overdose, no plan, N/A)      no  4. RISK OF HARM - HOMICIDAL IDEATION:  \"Do you ever have thoughts of " "hurting or killing someone else?\"  (e.g., yes, no, no but preoccupation with thoughts about death)    - INTENT:  \"Do you have thoughts of hurting or killing someone right NOW?\" (e.g., yes, no, N/A)    - PLAN: \"Do you have a specific plan for how you would do this?\" (e.g., gun, knife, no plan, N/A)       na  5. FUNCTIONAL IMPAIRMENT: \"How have things been going for you overall in your life? Have you had any more difficulties than usual doing your normal daily activities?\"  (e.g., better, same, worse; self-care, school, work, interactions)      Worse this week  6. SUPPORT: \"Who is with you now?\" \"Who do you live with?\" \"Do you have family or friends nearby who you can talk to?\"       Mom  7. THERAPIST: \"Do you have a counselor or therapist? Name?\"      Yes, Dr. Payan  8. STRESSORS: \"Has there been any new stress or recent changes in your life?\"      Change medications  9. DRUG ABUSE/ALCOHOL: \"Do you drink alcohol or use any illegal drugs?\"       no  10. OTHER: \"Do you have any other health or medical symptoms right now?\" (e.g., fever)        Feels like her heart is beating out of her chest.   11. PREGNANCY: \"Is there any chance you are pregnant?\" \"When was your last menstrual period?\"        no    Protocols used: BIPOLAR DISORDER (MANIC DEPRESSION)-ADULT-AH      "

## 2019-05-27 ENCOUNTER — APPOINTMENT (OUTPATIENT)
Dept: MRI IMAGING | Facility: HOSPITAL | Age: 44
End: 2019-05-27

## 2019-05-29 DIAGNOSIS — G43.119 INTRACTABLE MIGRAINE WITH AURA WITHOUT STATUS MIGRAINOSUS: ICD-10-CM

## 2019-05-30 RX ORDER — AMLODIPINE BESYLATE 5 MG/1
TABLET ORAL
Qty: 30 TABLET | Refills: 0 | Status: SHIPPED | OUTPATIENT
Start: 2019-05-30 | End: 2019-08-07 | Stop reason: SDUPTHER

## 2019-05-31 ENCOUNTER — HOSPITAL ENCOUNTER (OUTPATIENT)
Dept: MRI IMAGING | Facility: HOSPITAL | Age: 44
Discharge: HOME | End: 2019-05-31
Admitting: PHYSICIAN ASSISTANT

## 2019-05-31 ENCOUNTER — OFFICE VISIT (OUTPATIENT)
Dept: NEUROLOGY | Facility: CLINIC | Age: 44
End: 2019-05-31

## 2019-05-31 VITALS
HEIGHT: 62 IN | WEIGHT: 208 LBS | DIASTOLIC BLOOD PRESSURE: 66 MMHG | HEART RATE: 74 BPM | BODY MASS INDEX: 38.28 KG/M2 | SYSTOLIC BLOOD PRESSURE: 110 MMHG | OXYGEN SATURATION: 97 %

## 2019-05-31 DIAGNOSIS — Q85.00 NEUROFIBROMATOSIS (HCC): ICD-10-CM

## 2019-05-31 DIAGNOSIS — G43.109 MIGRAINE WITH AURA AND WITHOUT STATUS MIGRAINOSUS, NOT INTRACTABLE: Primary | ICD-10-CM

## 2019-05-31 DIAGNOSIS — R51.9 CHRONIC DAILY HEADACHE: ICD-10-CM

## 2019-05-31 DIAGNOSIS — H47.42: ICD-10-CM

## 2019-05-31 DIAGNOSIS — C72.30 LOW-GRADE OPTIC PATHWAY GLIOMA (HCC): ICD-10-CM

## 2019-05-31 LAB — CREAT BLDA-MCNC: 1.1 MG/DL (ref 0.6–1.3)

## 2019-05-31 PROCEDURE — 99213 OFFICE O/P EST LOW 20 MIN: CPT | Performed by: PHYSICIAN ASSISTANT

## 2019-05-31 PROCEDURE — 82565 ASSAY OF CREATININE: CPT

## 2019-05-31 PROCEDURE — 0 GADOBENATE DIMEGLUMINE 529 MG/ML SOLUTION: Performed by: PHYSICIAN ASSISTANT

## 2019-05-31 PROCEDURE — 70553 MRI BRAIN STEM W/O & W/DYE: CPT

## 2019-05-31 PROCEDURE — A9577 INJ MULTIHANCE: HCPCS | Performed by: PHYSICIAN ASSISTANT

## 2019-05-31 RX ORDER — GALCANEZUMAB 120 MG/ML
120 INJECTION, SOLUTION SUBCUTANEOUS
Qty: 1 ML | Refills: 3 | OUTPATIENT
Start: 2019-05-31 | End: 2019-06-08 | Stop reason: HOSPADM

## 2019-05-31 RX ORDER — BUSPIRONE HYDROCHLORIDE 15 MG/1
15 TABLET ORAL 3 TIMES DAILY
COMMUNITY
End: 2020-01-09

## 2019-05-31 RX ORDER — GALCANEZUMAB 120 MG/ML
240 INJECTION, SOLUTION SUBCUTANEOUS ONCE
Qty: 2 PEN | Refills: 0 | Status: SHIPPED | OUTPATIENT
Start: 2019-05-31 | End: 2019-05-31

## 2019-05-31 RX ORDER — RIZATRIPTAN BENZOATE 5 MG/1
5 TABLET, ORALLY DISINTEGRATING ORAL ONCE AS NEEDED
Qty: 9 TABLET | Refills: 6 | Status: SHIPPED | OUTPATIENT
Start: 2019-05-31 | End: 2020-07-02

## 2019-05-31 RX ORDER — TRAMADOL HYDROCHLORIDE 50 MG/1
50 TABLET ORAL EVERY 6 HOURS PRN
COMMUNITY
End: 2020-01-09

## 2019-05-31 RX ADMIN — GADOBENATE DIMEGLUMINE 20 ML: 529 INJECTION, SOLUTION INTRAVENOUS at 11:52

## 2019-06-08 ENCOUNTER — HOSPITAL ENCOUNTER (EMERGENCY)
Facility: HOSPITAL | Age: 44
Discharge: HOME | End: 2019-06-08
Admitting: EMERGENCY MEDICINE

## 2019-06-08 VITALS
SYSTOLIC BLOOD PRESSURE: 99 MMHG | HEART RATE: 61 BPM | DIASTOLIC BLOOD PRESSURE: 72 MMHG | OXYGEN SATURATION: 98 % | HEIGHT: 62 IN | WEIGHT: 207 LBS | TEMPERATURE: 97.8 F | BODY MASS INDEX: 38.09 KG/M2 | RESPIRATION RATE: 14 BRPM

## 2019-06-08 DIAGNOSIS — F32.A DEPRESSION, UNSPECIFIED DEPRESSION TYPE: Primary | ICD-10-CM

## 2019-06-08 DIAGNOSIS — Z86.59 HX OF BIPOLAR DISORDER: ICD-10-CM

## 2019-06-08 LAB
AMPHET+METHAMPHET UR QL: NEGATIVE
APAP SERPL-MCNC: <10 MCG/ML (ref 10–30)
BARBITURATES UR QL SCN: NEGATIVE
BENZODIAZ UR QL SCN: POSITIVE
CANNABINOIDS SERPL QL: NEGATIVE
COCAINE UR QL: NEGATIVE
ETHANOL UR QL: <0.01 %
METHADONE UR QL SCN: NEGATIVE
OPIATES UR QL: NEGATIVE
PCP UR QL SCN: NEGATIVE
SALICYLATES SERPL-MCNC: <1 MG/DL (ref 15–30)

## 2019-06-08 PROCEDURE — 80307 DRUG TEST PRSMV CHEM ANLYZR: CPT | Performed by: NURSE PRACTITIONER

## 2019-06-08 PROCEDURE — 99283 EMERGENCY DEPT VISIT LOW MDM: CPT

## 2019-06-08 NOTE — ED PROVIDER NOTES
"Subjective   Patient is a 44-year-old white female presents to the emergency department from home with complaints of suicidal ideations.  Patient does have a history of depression and bipolar disorder.  She states she was seen by her psychiatrist in [place] yesterday and advised him at that time that she was suicidal and pt states that he advised her that she needed to go to facility to be admitted. Pt states that she called back yesterday afternoon and left message but she states that she did not receive return call. Pt states that she has been feeling suicidal for several days and that her plan was to overdose on her prescribed medications. Pt resides with her mother and advised that her mother has been giving her her medications preventing her from overdosing. Mother is with pt today. Pt states that she has been to [facility] as well as [facility] and that \"they have not helped me\" she states that her doctor told her that she \"needed to go somewhere and get medicines figured out\" pt states that she was on rexulti in may and that it seemed to make depression worse. She states that was discontinued about 3 weeks ago         History provided by:  Patient   used: No        Review of Systems   Constitutional: Negative.    HENT: Negative.    Eyes: Negative.    Respiratory: Negative.    Cardiovascular: Negative.    Gastrointestinal: Negative.    Endocrine: Negative.    Genitourinary: Negative.    Musculoskeletal: Negative.    Skin: Negative.    Allergic/Immunologic: Negative.    Neurological: Negative.    Hematological: Negative.    Psychiatric/Behavioral:        Patient is a 44-year-old white female presents to the emergency department from home with complaints of suicidal ideations.  Patient does have a history of depression and bipolar disorder.  She states she was seen by her psychiatrist in [place] yesterday and advised him at that time that she was suicidal and pt states that he " "advised her that she needed to go to facility to be admitted. Pt states that she called back yesterday afternoon and left message but she states that she did not receive return call. Pt states that she has been feeling suicidal for several days and that her plan was to overdose on her prescribed medications. Pt resides with her mother and advised that her mother has been giving her her medications preventing her from overdosing. Mother is with pt today. Pt states that she has [place] [facility] [place] as well as [facility] and that \"they have not helped me\" she states that her doctor told her that she \"needed to go somewhere and get medicines figured out\" pt states that she was on rexulti in may and that it seemed to make depression worse. She states that was discontinued about 3 weeks ago      All other systems reviewed and are negative.      Past Medical History:   Diagnosis Date   • Anxiety    • Depression    • Headache    • Hyperlipidemia    • Hypertension    • Neurofibromatosis (CMS/HCC)     6 months old dx       Allergies   Allergen Reactions   • Amoxil [Amoxicillin]    • Biaxin [Clarithromycin]    • Cefzil [Cefprozil]    • Doxycycline    • Lisinopril    • Penicillin G      Other reaction(s): Unknown   • Penicillins    • Sulfa Antibiotics    • Sulfasalazine      Other reaction(s): Unknown   • Vancomycin    • Zofran [Ondansetron Hcl]    • Other Nausea And Vomiting and Other (See Comments)     Vyibriid, fatigue       Past Surgical History:   Procedure Laterality Date   • GALLBLADDER SURGERY     • HYSTERECTOMY     • OTHER SURGICAL HISTORY      repaired infundibular & valvular PS: res. trivial PS and Mild to Mod. PI (age 8)   • TONSILLECTOMY AND ADENOIDECTOMY         Family History   Problem Relation Age of Onset   • Breast cancer Mother    • Heart disease Father    • Hypertension Father        Social History     Socioeconomic History   • Marital status: Single     Spouse name: Not on file   • Number of " children: Not on file   • Years of education: Not on file   • Highest education level: Not on file   Tobacco Use   • Smoking status: Former Smoker     Types: Cigarettes   • Smokeless tobacco: Never Used   Substance and Sexual Activity   • Alcohol use: No   • Drug use: No   • Sexual activity: Defer       Prior to Admission medications    Medication Sig Start Date End Date Taking? Authorizing Provider   ALPRAZolam (XANAX) 1 MG tablet Take 1 mg by mouth 3 (Three) Times a Day As Needed for Anxiety.    Sadia Bradford MD   amLODIPine (NORVASC) 5 MG tablet TAKE ONE TABLET BY MOUTH EVERY DAY 5/30/19   Ranjeet Mathias PA   busPIRone (BUSPAR) 15 MG tablet Take 15 mg by mouth 3 (Three) Times a Day.    Sadia Bradford MD   carvedilol (COREG) 6.25 MG tablet Take 6.25 mg by mouth 2 (Two) Times a Day With Meals.    Sadia Bradford MD   cetirizine (zyrTEC) 10 MG tablet Take 10 mg by mouth Daily.    Sadia Bradford MD   DULoxetine (CYMBALTA) 60 MG capsule Take 90 mg by mouth Daily.    Sadia Bradford MD   EMGALITY 120 MG/ML prefilled syringe Inject 1 mL under the skin into the appropriate area as directed Every 30 (Thirty) Days. 5/31/19   Ranjeet Mathias PA   fenofibrate (TRICOR) 54 MG tablet Take 1 tablet by mouth Daily. 9/28/18   Sadia Bradford MD   FIBER COMPLETE PO Take  by mouth.    Sadia Bradford MD   losartan (COZAAR) 100 MG tablet Take 100 mg by mouth Daily.    Sadia Bradford MD   meloxicam (MOBIC) 15 MG tablet Take 15 mg by mouth Daily As Needed.    Sadia Bradford MD   memantine (NAMENDA) 10 MG tablet TAKE ONE TABLET BY MOUTH TWICE A DAY 11/27/18   Ranjeet Mathias PA   pantoprazole (PROTONIX) 40 MG EC tablet Take 40 mg by mouth Daily.    Sadia Bradford MD   rizatriptan MLT (MAXALT-MLT) 5 MG disintegrating tablet Take 1 tablet by mouth 1 (One) Time As Needed for Migraine for up to 1 dose. May repeat in 2 hours if needed 5/31/19    "Ranjeet Mathias PA   traMADol (ULTRAM) 50 MG tablet Take 50 mg by mouth Every 6 (Six) Hours As Needed for Moderate Pain .    Provider, MD Sadia   TROKENDI  MG capsule sustained-release 24 hr Take 1 tablet by mouth Daily. 5/6/19   Ranjeet Mathias PA   zolpidem (AMBIEN) 10 MG tablet Take 10 mg by mouth At Night As Needed for Sleep.    Provider, MD Sdaia       BP 99/72   Pulse 61   Temp 97.8 °F (36.6 °C) (Oral)   Resp 14   Ht 157.5 cm (62\")   Wt 93.9 kg (207 lb)   SpO2 98%   BMI 37.86 kg/m²     Objective   Physical Exam   Constitutional: She is oriented to person, place, and time. She appears well-developed and well-nourished.   HENT:   Head: Normocephalic and atraumatic.   Eyes: Conjunctivae and EOM are normal. Pupils are equal, round, and reactive to light.   Neck: Normal range of motion. Neck supple. No tracheal deviation present. No thyromegaly present.   Cardiovascular: Normal rate, regular rhythm, normal heart sounds and intact distal pulses.   Pulmonary/Chest: Effort normal and breath sounds normal. No respiratory distress. She has no wheezes. She has no rales. She exhibits no tenderness.   Abdominal: Soft. Bowel sounds are normal.   Musculoskeletal: Normal range of motion.   Neurological: She is alert and oriented to person, place, and time. She has normal reflexes. No cranial nerve deficit.   Skin: Skin is warm and dry.   Psychiatric: She has a normal mood and affect. Her behavior is normal. Judgment and thought content normal.   Nursing note and vitals reviewed.      Procedures         Lab Results (last 24 hours)     Procedure Component Value Units Date/Time    Urine Drug Screen - Urine, Clean Catch [[id number]]  (Abnormal) Collected:  06/08/19 1425    Specimen:  Urine, Clean Catch Updated:  06/08/19 1544     Amphetamine Screen, Urine Negative     Barbiturates Screen, Urine Negative     Benzodiazepine Screen, Urine Positive     Cocaine Screen, Urine Negative     " "Methadone Screen, Urine Negative     Opiate Screen Negative     Phencyclidine (PCP), Urine Negative     THC, Screen, Urine Negative    Narrative:       Negative Thresholds For Drugs Screened in Urine:    Amphetamines          500 ng/ml  Barbiturates          200 ng/ml  Benzodiazepines       200 ng/ml  Cocaine               150 ng/ml  Methadone             150 ng/ml  Opiates               300 ng/ml  Phencyclidine         25 ng/ml  THC                      50 ng/ml    The normal value for all drugs tested is negative. This report includes final unconfirmed screening results.  A positive result by this assay can be, at your request, sent to the Reference Lab for confirmation by gas chromatography. Unconfirmed results must not be used for non-medical purposes, such as employment or legal testing. Clinical consideration should be applied to any drug of abuse test result, particularly when unconfirmed results are used.    Ethanol [[id number]]  (Normal) Collected:  06/08/19 1434    Specimen:  Blood Updated:  06/08/19 1451     Ethanol % <0.010 %     Narrative:       Not for legal purposes. Chain of Custody not followed.     Acetaminophen Level [[id number]]  (Abnormal) Collected:  06/08/19 1434    Specimen:  Blood Updated:  06/08/19 1459     Acetaminophen <10.0 mcg/mL     Salicylate Level [[id number]]  (Abnormal) Collected:  06/08/19 1434    Specimen:  Blood Updated:  06/08/19 1459     Salicylate <1.0 mg/dL           No orders to display       ED Course  ED Course as of Jun 08 2129   Sat Jun 08, 2019   1852 [facility] has eval pt in the er. They do feel that pt is able to be discharged home with mother. Pt lives with mother and she is administering medications at this time. Evidently pt has just started counseling within the last few weeks but she states that she did not go last week as sched because she thought \"I would be admitted to the hospital so I cancelled.\" pt was provided a safety sheet and signed agreement with [facility] " [facility] counselor and mother. Pt will be discharged home soon in stable condition to follow up with her doctor on Monday. Advised to return before if symptoms worsen.   [CW]      ED Course User Index  [CW] Cynthia Gusman, KEI          MDM  Number of Diagnoses or Management Options  Depression, unspecified depression type: minor  Hx of bipolar disorder: minor     Amount and/or Complexity of Data Reviewed  Clinical lab tests: ordered and reviewed    Patient Progress  Patient progress: stable      Final diagnoses:   Depression, unspecified depression type   Hx of bipolar disorder          Cynthia Gusman, KEI  06/08/19 5508

## 2019-06-09 NOTE — DISCHARGE INSTRUCTIONS
Return to ER if symptoms worsen       Depression Screening  Depression screening is a tool that your health care provider can use to learn if you have symptoms of depression. Depression is a common condition with many symptoms that are also often found in other conditions. Depression is treatable, but it must first be diagnosed. You may not know that certain feelings, thoughts, and behaviors that you are having can be symptoms of depression. Taking a depression screening test can help you and your health care provider decide if you need more assessment, or if you should be referred to a mental health care provider.  What are the screening tests?  · You may have a physical exam to see if another condition is affecting your mental health. You may have a blood or urine sample taken during the physical exam.  · You may be interviewed using a screening tool that was developed from research, such as one of these:  ? Patient Health Questionnaire (PHQ). This is a set of either 2 or 9 questions. A health care provider who has been trained to score this screening test uses a guide to assess if your symptoms suggest that you may have depression.  ? [place] Depression Rating Scale (HAM-D). This is a set of either 17 or 24 questions. You may be asked to take it again during or after your treatment, to see if your depression has gotten better.  ? Rajan Depression Inventory (BDI). This is a set of 21 multiple choice questions. Your health care provider scores your answers to assess:  § Your level of depression, ranging from mild to severe.  § Your response to treatment.  · Your health care provider may talk with you about your daily activities, such as eating, sleeping, work, and recreation, and ask if you have had any changes in activity.  · Your health care provider may ask you to see a mental health specialist, such as a psychiatrist or psychologist, for more evaluation.  Who should be screened for depression?  · All adults,  including adults with a family history of a mental health disorder.  · Adolescents who are 12-18 years old.  · People who are recovering from a myocardial infarction (MI).  · Pregnant women, or women who have given birth.  · People who have a long-term (chronic) illness.  · Anyone who has been diagnosed with another type of a mental health disorder.  · Anyone who has symptoms that could show depression.  What do my results mean?  Your health care provider will review the results of your depression screening, physical exam, and lab tests. Positive screens suggest that you may have depression. Screening is the first step in getting the care that you may need. It is up to you to get your screening results. Ask your health care provider, or the department that is doing your screening tests, when your results will be ready. Talk with your health care provider about your results and diagnosis.  A diagnosis of depression is made using the Diagnostic and Statistical Manual of Mental Disorders (DSM-V). This is a book that lists the number and type of symptoms that must be present for a health care provider to give a specific diagnosis.   Your health care provider may work with you to treat your symptoms of depression, or your health care provider may help you find a mental health provider who can assess, diagnose, and treat your depression.  Get help right away if:  · You have thoughts about hurting yourself or others.  If you ever feel like you may hurt yourself or others, or have thoughts about taking your own life, get help right away. You can go to your nearest emergency department or call:  · Your local emergency services (911 in the [place]  · A suicide crisis helpline, such as the National Suicide Prevention Lifeline at 1-598.778.1034. This is open 24 hours a day.    Summary  · Depression screening is the first step in getting the help that you may need.  · If your screening test shows symptoms of depression (is  positive), your health care provider may ask you to see a mental health provider.  · Anyone who is age 12 or older should be screened for depression.  This information is not intended to replace advice given to you by your health care provider. Make sure you discuss any questions you have with your health care provider.  Document Released: 05/04/2018 Document Revised: 05/04/2018 Document Reviewed: 05/04/2018  [employer] Interactive Patient Education © 2019 [employer]      Major Depressive Disorder, Adult  Major depressive disorder (MDD) is a mental health condition. MDD often makes you feel sad, hopeless, or helpless. MDD can also cause symptoms in your body. MDD can affect your:  · Work.  · School.  · Relationships.  · Other normal activities.    MDD can range from mild to very bad. It may occur once (single episode MDD). It can also occur many times (recurrent MDD).  The main symptoms of MDD often include:  · Feeling sad, depressed, or irritable most of the time.  · Loss of interest.    MDD symptoms also include:  · Sleeping too much or too little.  · Eating too much or too little.  · A change in your weight.  · Feeling tired (fatigue) or having low energy.  · Feeling worthless.  · Feeling guilty.  · Trouble making decisions.  · Trouble thinking clearly.  · Thoughts of suicide or harming others.  · Feeling weak.  · Feeling agitated.  · Keeping yourself from being around other people (isolation).    Follow these instructions at home:  Activity  · Do these things as told by your doctor:  ? Go back to your normal activities.  ? Exercise regularly.  ? Spend time outdoors.  Alcohol  · Talk with your doctor about how alcohol can affect your antidepressant medicines.  · Do not drink alcohol. Or, limit how much alcohol you drink.  ? This means no more than 1 drink a day for nonpregnant women and 2 drinks a day for men. One drink equals one of these:  § 12 oz of beer.  § 5 oz of wine.  § 1½ oz of hard liquor.  General  instructions  · Take over-the-counter and prescription medicines only as told by your doctor.  · Eat a healthy diet.  · Get plenty of sleep.  · Find activities that you enjoy. Make time to do them.  · Think about joining a support group. Your doctor may be able to suggest a group for you.  · Keep all follow-up visits as told by your doctor. This is important.  Where to find more information:  · National [place] on Mental Illness:  ? www.asha.org  · [place] National [place] of [place] Health:  ? www.[facility].nih.gov  · National Suicide Prevention Lifeline:  ? 1-559.742.8422. This is free, 24-hour help.  Contact a doctor if:  · Your symptoms get worse.  · You have new symptoms.  Get help right away if:  · You self-harm.  · You see, hear, taste, smell, or feel things that are not present (hallucinate).  If you ever feel like you may hurt yourself or others, or have thoughts about taking your own life, get help right away. You can go to your nearest emergency department or call:  · Your local emergency services (911 in the [place]  · A suicide crisis helpline, such as the National Suicide Prevention Lifeline:  ? 1-639.490.8914. This is open 24 hours a day.    This information is not intended to replace advice given to you by your health care provider. Make sure you discuss any questions you have with your health care provider.  Document Released: 11/28/2016 Document Revised: 09/03/2017 Document Reviewed: 09/03/2017  [employer] Interactive Patient Education © 2019 [employer]      Living With Depression  Everyone experiences occasional disappointment, sadness, and loss in their lives. When you are feeling down, blue, or sad for at least 2 weeks in a row, it may mean that you have depression. Depression can affect your thoughts and feelings, relationships, daily activities, and physical health. It is caused by changes in the way your brain functions. If you receive a diagnosis of depression, your health care provider will tell  you which type of depression you have and what treatment options are available to you.  If you are living with depression, there are ways to help you recover from it and also ways to prevent it from coming back.  How to cope with lifestyle changes  Coping with stress  Stress is your body’s reaction to life changes and events, both good and bad. Stressful situations may include:  · Getting .  · The death of a spouse.  · Losing a job.  · Retiring.  · Having a baby.    Stress can last just a few hours or it can be ongoing. Stress can play a major role in depression, so it is important to learn both how to cope with stress and how to think about it differently.  Talk with your health care provider or a counselor if you would like to learn more about stress reduction. He or she may suggest some stress reduction techniques, such as:  · Music therapy. This can include creating music or listening to music. Choose music that you enjoy and that inspires you.  · Mindfulness-based meditation. This kind of meditation can be done while sitting or walking. It involves being aware of your normal breaths, rather than trying to control your breathing.  · Centering prayer. This is a kind of meditation that involves focusing on a spiritual word or phrase. Choose a word, phrase, or sacred image that is meaningful to you and that brings you peace.  · Deep breathing. To do this, expand your stomach and inhale slowly through your nose. Hold your breath for 3-5 seconds, then exhale slowly, allowing your stomach muscles to relax.  · Muscle relaxation. This involves intentionally tensing muscles then relaxing them.    Choose a stress reduction technique that fits your lifestyle and personality. Stress reduction techniques take time and practice to develop. Set aside 5-15 minutes a day to do them. Therapists can offer training in these techniques. The training may be covered by some insurance plans. Other things you can do to manage  stress include:  · Keeping a stress diary. This can help you learn what triggers your stress and ways to control your response.  · Understanding what your limits are and saying no to requests or events that lead to a schedule that is too full.  · Thinking about how you respond to certain situations. You may not be able to control everything, but you can control how you react.  · Adding humor to your life by watching funny films or TV shows.  · Making time for activities that help you relax and not feeling guilty about spending your time this way.    Medicines  Your health care provider may suggest certain medicines if he or she feels that they will help improve your condition. Avoid using alcohol and other substances that may prevent your medicines from working properly (may interact). It is also important to:  · Talk with your pharmacist or health care provider about all the medicines that you take, their possible side effects, and what medicines are safe to take together.  · Make it your goal to take part in all treatment decisions (shared decision-making). This includes giving input on the side effects of medicines. It is best if shared decision-making with your health care provider is part of your total treatment plan.    If your health care provider prescribes a medicine, you may not notice the full benefits of it for 4-8 weeks. Most people who are treated for depression need to be on medicine for at least 6-12 months after they feel better. If you are taking medicines as part of your treatment, do not stop taking medicines without first talking to your health care provider. You may need to have the medicine slowly decreased (tapered) over time to decrease the risk of harmful side effects.  Relationships  Your health care provider may suggest family therapy along with individual therapy and drug therapy. While there may not be family problems that are causing you to feel depressed, it is still important to make  sure your family learns as much as they can about your mental health. Having your family’s support can help make your treatment successful.  How to recognize changes in your condition  Everyone has a different response to treatment for depression. Recovery from major depression happens when you have not had signs of major depression for two months. This may mean that you will start to:  · Have more interest in doing activities.  · Feel less hopeless than you did 2 months ago.  · Have more energy.  · Overeat less often, or have better or improving appetite.  · Have better concentration.    Your health care provider will work with you to decide the next steps in your recovery. It is also important to recognize when your condition is getting worse. Watch for these signs:  · Having fatigue or low energy.  · Eating too much or too little.  · Sleeping too much or too little.  · Feeling restless, agitated, or hopeless.  · Having trouble concentrating or making decisions.  · Having unexplained physical complaints.  · Feeling irritable, angry, or aggressive.    Get help as soon as you or your family members notice these symptoms coming back.  How to get support and help from others  How to talk with friends and family members about your condition  Talking to friends and family members about your condition can provide you with one way to get support and guidance. Reach out to trusted friends or family members, explain your symptoms to them, and let them know that you are working with a health care provider to treat your depression.  Financial resources  Not all insurance plans cover mental health care, so it is important to check with your insurance carrier. If paying for co-pays or counseling services is a problem, search for a local or [facility] mental health care center. They may be able to offer public mental health care services at low or no cost when you are not able to see a private health care provider.  If you are  taking medicine for depression, you may be able to get the generic form, which may be less expensive. Some makers of prescription medicines also offer help to patients who cannot afford the medicines they need.  Follow these instructions at home:  · Get the right amount and quality of sleep.  · Cut down on using caffeine, tobacco, alcohol, and other potentially harmful substances.  · Try to exercise, such as walking or lifting small weights.  · Take over-the-counter and prescription medicines only as told by your health care provider.  · Eat a healthy diet that includes plenty of vegetables, fruits, whole grains, low-fat dairy products, and lean protein. Do not eat a lot of foods that are high in solid fats, added sugars, or salt.  · Keep all follow-up visits as told by your health care provider. This is important.  Contact a health care provider if:  · You stop taking your antidepressant medicines, and you have any of these symptoms:  ? Nausea.  ? Headache.  ? Feeling lightheaded.  ? Chills and body aches.  ? Not being able to sleep (insomnia).  · You or your friends and family think your depression is getting worse.  Get help right away if:  · You have thoughts of hurting yourself or others.  If you ever feel like you may hurt yourself or others, or have thoughts about taking your own life, get help right away. You can go to your nearest emergency department or call:  · Your local emergency services (911 in the [place]  · A suicide crisis helpline, such as the National Suicide Prevention Lifeline at 1-751.589.4116. This is open 24-hours a day.    Summary  · If you are living with depression, there are ways to help you recover from it and also ways to prevent it from coming back.  · Work with your health care team to create a management plan that includes counseling, stress management techniques, and healthy lifestyle habits.  This information is not intended to replace advice given to you by your health care  provider. Make sure you discuss any questions you have with your health care provider.  Document Released: 11/20/2017 Document Revised: 11/20/2017 Document Reviewed: 11/20/2017  [employer] Interactive Patient Education © 2019 [employer]    Helping Someone Who is Suicidal  Suicide is when someone takes his or her own life. Someone who is thinking about suicide needs immediate help. Although you might not know what to say or do to help, start by letting that person know you care. Listen to him or her. Then talk about how to get help. Help is available through therapy, medicine, and other treatments.  What are signs that someone is suicidal?  Common signs include:  · Signs of depression, such as:  ? Rage.  ? Irritability.  ? Shame.  ? Excessive worry.  ? Loss of interest in things the person once enjoyed.  · Changes in social behaviors and relationships, including:  ? Isolating oneself.  ? Withdrawing from friends and family.  ? Giving away possessions.  ? Saying good-bye.  ? Acting aggressively.  ? Sleeping more or less than usual.  ? Having trouble managing school or work.  ? Talking about feeling hopeless or being a burden.  ? Engaging in risky behaviors, such as drinking more alcohol or using more drugs.    What are the risk factors for suicide?  Risk factors for suicide include:  · Other suicides in the family.  · A history of suicide attempts.  · Depression or other mental health issues.  · Being in [facility] or facing [facility] time.  · Having had close friends who have committed suicide.  · Alcohol or drug abuse, especially combined with a mental illness.    What should I do if someone is suicidal?  If you believe a person is in immediate danger of committing suicide, call your local emergency services (911 in the [place] for help.  If a person says he or she wants to commit suicide, take the threat seriously. Help the person get help right away by:  · Calling your local emergency services.  · Calling a suicide prevention  hotline.  · Contacting a crisis center or a local suicide prevention center. These are often located at hospitals, clinics, community service organizations, social service providers, or health departments.    If a person confides in you that he or she is considering suicide:  · Listen to the person's thoughts and concerns with compassion.  · Let the person know you will stay with him or her.  · Ask if the person is having thoughts of hurting himself or herself.  · Offer to help the person get to a doctor or mental health professional.  · Remove all weapons and medicines from the person's living space.  · Do not promise to keep his or her thoughts of suicide a secret.    This information is not intended to replace advice given to you by your health care provider. Make sure you discuss any questions you have with your health care provider.  Document Released: 06/23/2004 Document Revised: 05/25/2017 Document Reviewed: 05/28/2015  [employer] Interactive Patient Education © 2018 [employer]

## 2019-06-12 RX ORDER — LORAZEPAM 1 MG/1
TABLET ORAL
Qty: 3 TABLET | Refills: 0 | OUTPATIENT
Start: 2019-06-12 | End: 2020-01-09

## 2019-06-15 NOTE — PROGRESS NOTES
Subjective   Sandy Estes is a 44 y.o. female is here today for follow-up.    Headache    This is a chronic problem. The current episode started more than 1 year ago. The problem occurs daily. The problem has been gradually worsening. The pain is located in the bilateral, frontal, parietal, vertex and occipital region. The pain radiates to the left neck and right neck. The pain quality is similar to prior headaches. The quality of the pain is described as aching, stabbing and shooting. The pain is severe. Associated symptoms include back pain, dizziness, neck pain, phonophobia, photophobia, scalp tenderness and vomiting. Pertinent negatives include no weakness. The symptoms are aggravated by activity, bright light, fatigue, emotional stress and Valsalva. She has tried acetaminophen, antidepressants, Excedrin, darkened room, NSAIDs, oxygen, oral narcotics and injectable narcotics for the symptoms. The treatment provided moderate relief. Her past medical history is significant for migraine headaches and migraines in the family.   Migraine    This is a chronic problem. The current episode started more than 1 year ago. The problem occurs intermittently. The problem has been waxing and waning. The pain is located in the bilateral, frontal and retro-orbital region. The pain does not radiate. The pain quality is similar to prior headaches. The quality of the pain is described as aching, throbbing and pulsating. The pain is severe. Associated symptoms include back pain, dizziness, neck pain, phonophobia, photophobia, scalp tenderness and vomiting. Pertinent negatives include no weakness. The symptoms are aggravated by activity, bright light, fatigue, emotional stress, noise and Valsalva. She has tried acetaminophen, antidepressants, Excedrin, darkened room and NSAIDs for the symptoms. The treatment provided moderate relief. Her past medical history is significant for migraine headaches and migraines in the family.        The following portions of the patient's history were reviewed and updated as appropriate: allergies, current medications, past family history, past medical history, past social history, past surgical history and problem list.    Review of Systems   Constitutional: Positive for fatigue.   Eyes: Positive for photophobia and visual disturbance.   Respiratory: Negative.    Cardiovascular: Negative.    Gastrointestinal: Positive for vomiting.   Endocrine: Negative.    Genitourinary: Negative.    Musculoskeletal: Positive for back pain, gait problem and neck pain.   Skin: Negative.         Cutaneous neurofibromas   Allergic/Immunologic: Negative.    Neurological: Positive for dizziness and headaches. Negative for syncope and weakness.   Hematological: Negative.    Psychiatric/Behavioral: Positive for dysphoric mood and sleep disturbance. The patient is nervous/anxious.        Objective   Physical Exam   Constitutional: She is oriented to person, place, and time. Vital signs are normal. She appears well-developed and well-nourished. No distress.   HENT:   Head: Normocephalic and atraumatic.   Right Ear: Hearing and external ear normal.   Left Ear: Hearing and external ear normal.   Nose: Nose normal.   Mouth/Throat: Uvula is midline, oropharynx is clear and moist and mucous membranes are normal.   Eyes: Conjunctivae, EOM and lids are normal. Pupils are equal, round, and reactive to light. No scleral icterus.   Fundoscopic exam:       The right eye shows no hemorrhage and no papilledema. The right eye shows red reflex.        The left eye shows no hemorrhage and no papilledema. The left eye shows red reflex.   Neck: Trachea normal, normal range of motion and phonation normal. No JVD present. Carotid bruit is not present.   Cardiovascular: Normal rate, regular rhythm and normal heart sounds.   No murmur heard.  Pulmonary/Chest: Effort normal and breath sounds normal.   Musculoskeletal: She exhibits no edema or  tenderness.        Lumbar back: She exhibits decreased range of motion and pain.   Lymphadenopathy:     She has no cervical adenopathy.   Neurological: She is alert and oriented to person, place, and time. She has normal strength and normal reflexes. She displays no atrophy and no tremor. No cranial nerve deficit or sensory deficit. She exhibits normal muscle tone. Coordination and gait normal. GCS eye subscore is 4. GCS verbal subscore is 5. GCS motor subscore is 6.   Reflex Scores:       Tricep reflexes are 2+ on the right side and 2+ on the left side.       Bicep reflexes are 2+ on the right side and 2+ on the left side.       Brachioradialis reflexes are 2+ on the right side and 2+ on the left side.       Patellar reflexes are 2+ on the right side and 2+ on the left side.       Achilles reflexes are 2+ on the right side and 2+ on the left side.  Skin: Skin is warm, dry and intact. Lesion noted.   Multiple cutaneous neurofibromas   Psychiatric: She has a normal mood and affect. Her speech is normal and behavior is normal. Judgment and thought content normal. Cognition and memory are normal.   Nursing note and vitals reviewed.        Assessment/Plan   Sandy was seen today for headache and migraine.    Diagnoses and all orders for this visit:    Migraine with aura and without status migrainosus, not intractable  -     rizatriptan MLT (MAXALT-MLT) 5 MG disintegrating tablet; Take 1 tablet by mouth 1 (One) Time As Needed for Migraine for up to 1 dose. May repeat in 2 hours if needed  -     EMGALITY 120 MG/ML prefilled syringe; Inject 2 mL under the skin into the appropriate area as directed 1 (One) Time for 1 dose.  -     Discontinue: EMGALITY 120 MG/ML prefilled syringe; Inject 1 mL under the skin into the appropriate area as directed Every 30 (Thirty) Days.    Low-grade optic pathway glioma (CMS/HCC)    Chronic daily headache    Neurofibromatosis (CMS/HCC)    Findings and follow up recommendations reviewed.      10  minutes of a 15 minute visit was spent in counseling and coordination of care.

## 2019-06-25 ENCOUNTER — OFFICE VISIT (OUTPATIENT)
Dept: OBGYN | Age: 44
End: 2019-06-25
Payer: MEDICAID

## 2019-06-25 VITALS
BODY MASS INDEX: 40.22 KG/M2 | SYSTOLIC BLOOD PRESSURE: 90 MMHG | DIASTOLIC BLOOD PRESSURE: 60 MMHG | WEIGHT: 213 LBS | HEIGHT: 61 IN

## 2019-06-25 DIAGNOSIS — Z12.72 SCREENING FOR VAGINAL CANCER: ICD-10-CM

## 2019-06-25 DIAGNOSIS — Z01.419 WELL FEMALE EXAM WITH ROUTINE GYNECOLOGICAL EXAM: Primary | ICD-10-CM

## 2019-06-25 DIAGNOSIS — Z11.51 SCREENING FOR HPV (HUMAN PAPILLOMAVIRUS): ICD-10-CM

## 2019-06-25 PROCEDURE — 99396 PREV VISIT EST AGE 40-64: CPT | Performed by: NURSE PRACTITIONER

## 2019-06-25 SDOH — HEALTH STABILITY: MENTAL HEALTH: HOW OFTEN DO YOU HAVE A DRINK CONTAINING ALCOHOL?: MONTHLY OR LESS

## 2019-06-25 ASSESSMENT — ENCOUNTER SYMPTOMS
GASTROINTESTINAL NEGATIVE: 1
RESPIRATORY NEGATIVE: 1
EYES NEGATIVE: 1

## 2019-06-25 NOTE — PROGRESS NOTES
Swapnil Eubanks is a 45 y.o. female who presents today for her medical conditions/ complaints as noted below. Swapnil Eubanks is c/o of Establish Care and Gynecologic Exam        HPI  Patient is here today to re establish care. Pt is needing her annual exam completed. Pt has had a hysterectomy in  for benign issues. Pt has not had a new sexual partner still last visit in , not currently sexually active. Gets labs at PCP. Wants to do pap today since been awhile. Last pap:  Last mammo:2018  Last colonoscopy:  Last Bone density:     Contraception: hysterectomy  Patient's last menstrual period was 2003.     Past Medical History:   Diagnosis Date    Anxiety     Asthma     Bell's palsy     during pregnancy in     Chronic back pain     Depression     Endometriosis     Insomnia     Migraines     better with massage therapy    Neurofibromatosis ([address]     Neurofibromatosis ([address]     tumor on her 4th L finger and cafe au lait spots on skin, no brain tumors    Obesity     Osteopenia     Scoliosis     Type II or unspecified type diabetes mellitus without mention of complication, uncontrolled     post prandials over 200 in . Past Surgical History:   Procedure Laterality Date     SECTION      breech baby, born a month early, mom was on bed rest for early contraction, had bells palsy in pregnancy    CHOLECYSTECTOMY      COLONOSCOPY  ?         COLONOSCOPY  2013    [facility]    HYSTERECTOMY      [facility] by Dr. Nydia Choi, no cancer, had adhesions and endometriosis    PULMONARY VALVE REPLACEMENT      had valve replaced when she was 12 yo; had leaking valve from age 6 mo old   [facility] TONSILLECTOMY AND ADENOIDECTOMY      TUBAL LIGATION      UPPER GASTROINTESTINAL ENDOSCOPY      [facility]    UPPER GASTROINTESTINAL ENDOSCOPY  10/25/13    [facility]     Family History   Problem Relation Age of Onset    Breast Cancer Mother 62        Braca negative    Other Mother         neurofibromatosis    Neurofibromatosis Mother     Cancer Mother         Colon    Hypertension Father     Heart Disease Father         A fib, has had cardioversion    Colon Polyps Father     Diabetes Maternal Grandmother     Breast Cancer Maternal Grandfather 48    Heart Attack Maternal Grandfather     Stroke Maternal Grandfather     Neurofibromatosis Maternal Grandfather     Heart Attack Paternal Grandmother     Heart Attack Paternal Grandfather     Breast Cancer Other     Neurofibromatosis Son     Breast Cancer Other     Colon Cancer Neg Hx      Social History     Tobacco Use    Smoking status: Former Smoker     Last attempt to quit: 3/8/2013     Years since quittin.3    Smokeless tobacco: Never Used   Substance Use Topics    Alcohol use: Yes     Frequency: Monthly or less     Binge frequency: Never     Comment: rare       Current Outpatient Medications   Medication Sig Dispense Refill    memantine (NAMENDA) 10 MG tablet Take 10 mg by mouth 2 times daily      carvedilol (COREG) 6.25 MG tablet Take 6.25 mg by mouth      pantoprazole (PROTONIX) 40 MG tablet Take 40 mg by mouth      losartan (COZAAR) 100 MG tablet Take 100 mg by mouth      ALPRAZolam (XANAX) 0.5 MG tablet Take 0.5 mg by mouth.  DULoxetine (CYMBALTA) 60 MG extended release capsule Take 60 mg by mouth      promethazine (PHENERGAN) 25 MG tablet Take 25 mg by mouth as needed. 0    Glucose Blood (BLOOD GLUCOSE TEST STRIPS) STRP Test twice daily or  strip 0    cetirizine (WAL-ZYR) 10 MG tablet Take 1 tablet by mouth daily. 30 tablet 11    albuterol (PROVENTIL HFA) 108 (90 BASE) MCG/ACT inhaler Inhale 2 puffs into the lungs every 6 hours as needed for Wheezing.  1 Inhaler 3    Blood Glucose Monitoring Suppl VANGIE Use to check sugar twice a day 1 Device 0    zolpidem (AMBIEN) 10 MG tablet 1/2 to 1 tab po at hs prn sleep 30 tablet 5    acetaminophen (TYLENOL) 500 MG tablet Take 500

## 2019-07-02 LAB
HPV SAMPLE: NORMAL
HPV TYPE 16: NOT DETECTED
HPV TYPE 18: NOT DETECTED
HPV, HIGH RISK OTHER: NOT DETECTED
INTERPRETATION: NORMAL
SOURCE: NORMAL

## 2019-08-07 DIAGNOSIS — G43.119 INTRACTABLE MIGRAINE WITH AURA WITHOUT STATUS MIGRAINOSUS: ICD-10-CM

## 2019-08-08 RX ORDER — AMLODIPINE BESYLATE 5 MG/1
TABLET ORAL
Qty: 30 TABLET | Refills: 5 | Status: SHIPPED | OUTPATIENT
Start: 2019-08-08 | End: 2020-03-03

## 2019-11-27 DIAGNOSIS — G43.119 INTRACTABLE MIGRAINE WITH AURA WITHOUT STATUS MIGRAINOSUS: ICD-10-CM

## 2019-11-27 RX ORDER — TOPIRAMATE 200 MG/1
CAPSULE, EXTENDED RELEASE ORAL
Qty: 30 CAPSULE | Refills: 5 | Status: SHIPPED | OUTPATIENT
Start: 2019-11-27 | End: 2020-05-26

## 2020-01-09 ENCOUNTER — OFFICE VISIT (OUTPATIENT)
Dept: NEUROLOGY | Facility: CLINIC | Age: 45
End: 2020-01-09

## 2020-01-09 ENCOUNTER — HOSPITAL ENCOUNTER (OUTPATIENT)
Dept: WOMENS IMAGING | Age: 45
Discharge: HOME | End: 2020-01-09
Payer: MEDICAID

## 2020-01-09 ENCOUNTER — OFFICE VISIT (OUTPATIENT)
Dept: SURGERY | Age: 45
End: 2020-01-09
Payer: MEDICAID

## 2020-01-09 VITALS
WEIGHT: 212 LBS | BODY MASS INDEX: 40.02 KG/M2 | HEIGHT: 61 IN | HEART RATE: 61 BPM | DIASTOLIC BLOOD PRESSURE: 79 MMHG | TEMPERATURE: 98.5 F | SYSTOLIC BLOOD PRESSURE: 123 MMHG

## 2020-01-09 VITALS
WEIGHT: 211 LBS | HEIGHT: 62 IN | DIASTOLIC BLOOD PRESSURE: 80 MMHG | HEART RATE: 60 BPM | SYSTOLIC BLOOD PRESSURE: 120 MMHG | BODY MASS INDEX: 38.83 KG/M2

## 2020-01-09 DIAGNOSIS — G43.109 MIGRAINE WITH AURA AND WITHOUT STATUS MIGRAINOSUS, NOT INTRACTABLE: ICD-10-CM

## 2020-01-09 DIAGNOSIS — C72.30 LOW-GRADE OPTIC PATHWAY GLIOMA (HCC): ICD-10-CM

## 2020-01-09 DIAGNOSIS — Q85.00 NEUROFIBROMATOSIS (HCC): Primary | ICD-10-CM

## 2020-01-09 PROCEDURE — 99213 OFFICE O/P EST LOW 20 MIN: CPT | Performed by: PHYSICIAN ASSISTANT

## 2020-01-09 PROCEDURE — G8484 FLU IMMUNIZE NO ADMIN: HCPCS | Performed by: PHYSICIAN ASSISTANT

## 2020-01-09 PROCEDURE — G8427 DOCREV CUR MEDS BY ELIG CLIN: HCPCS | Performed by: PHYSICIAN ASSISTANT

## 2020-01-09 PROCEDURE — G8417 CALC BMI ABV UP PARAM F/U: HCPCS | Performed by: PHYSICIAN ASSISTANT

## 2020-01-09 PROCEDURE — 1036F TOBACCO NON-USER: CPT | Performed by: PHYSICIAN ASSISTANT

## 2020-01-09 PROCEDURE — 77067 SCR MAMMO BI INCL CAD: CPT

## 2020-01-09 NOTE — PROGRESS NOTES
Subjective   Sandy Estes is a 44 y.o. female is here today for follow-up.    Emgality has resulted in significant improvement with regard to her headaches.  She has not had any new visual complaints and is due for a follow-up MRI with known optic glioma.    Headache    This is a chronic problem. The current episode started more than 1 year ago. The problem occurs daily. The problem has been gradually improving. The pain is located in the bilateral, frontal, parietal, vertex and occipital region. The pain radiates to the left neck and right neck. The pain quality is similar to prior headaches. The quality of the pain is described as aching, stabbing and shooting. The pain is severe. Associated symptoms include back pain, dizziness, neck pain, phonophobia, photophobia, scalp tenderness and vomiting. Pertinent negatives include no weakness. The symptoms are aggravated by activity, bright light, fatigue, emotional stress and Valsalva. She has tried acetaminophen, antidepressants, Excedrin, darkened room, NSAIDs, oxygen, oral narcotics and injectable narcotics (Emgality) for the symptoms. The treatment provided significant relief. Her past medical history is significant for migraine headaches and migraines in the family.   Migraine    This is a chronic problem. The current episode started more than 1 year ago. The problem occurs intermittently. The problem has been rapidly improving. The pain is located in the bilateral, frontal and retro-orbital region. The pain does not radiate. The pain quality is similar to prior headaches. The quality of the pain is described as aching, throbbing and pulsating. The pain is severe. Associated symptoms include back pain, dizziness, neck pain, phonophobia, photophobia, scalp tenderness and vomiting. Pertinent negatives include no weakness. The symptoms are aggravated by activity, bright light, fatigue, emotional stress, noise and Valsalva. She has tried acetaminophen,  antidepressants, Excedrin, darkened room and NSAIDs (Emgality) for the symptoms. The treatment provided significant relief. Her past medical history is significant for migraine headaches and migraines in the family.       The following portions of the patient's history were reviewed and updated as appropriate: allergies, current medications, past family history, past medical history, past social history, past surgical history and problem list.    Review of Systems   Constitutional: Positive for fatigue.   Eyes: Positive for photophobia and visual disturbance.   Respiratory: Negative.    Cardiovascular: Negative.    Gastrointestinal: Positive for vomiting.   Endocrine: Negative.    Genitourinary: Negative.    Musculoskeletal: Positive for back pain, gait problem and neck pain.   Skin: Negative.         Cutaneous neurofibromas   Allergic/Immunologic: Negative.    Neurological: Positive for dizziness and headaches. Negative for syncope and weakness.   Hematological: Negative.    Psychiatric/Behavioral: Positive for dysphoric mood and sleep disturbance. The patient is nervous/anxious.        Objective   Physical Exam   Constitutional: She is oriented to person, place, and time. Vital signs are normal.   HENT:   Head: Normocephalic and atraumatic.   Right Ear: Hearing and external ear normal.   Left Ear: Hearing and external ear normal.   Nose: Nose normal.   Mouth/Throat: Uvula is midline, oropharynx is clear and moist and mucous membranes are normal.   Eyes: Pupils are equal, round, and reactive to light. Conjunctivae, EOM and lids are normal. No scleral icterus.   Fundoscopic exam:       The right eye shows no hemorrhage and no papilledema. The right eye shows red reflex.        The left eye shows no hemorrhage and no papilledema. The left eye shows red reflex.   Neck: Trachea normal, normal range of motion and phonation normal. No JVD present. Carotid bruit is not present.   Cardiovascular: Normal rate, regular  rhythm and normal heart sounds.   No murmur heard.  Pulmonary/Chest: Effort normal and breath sounds normal.   Musculoskeletal: She exhibits no edema or tenderness.        Lumbar back: She exhibits decreased range of motion and pain.   Lymphadenopathy:     She has no cervical adenopathy.   Neurological: She is oriented to person, place, and time. She has normal strength and normal reflexes. She displays no atrophy and no tremor. No cranial nerve deficit or sensory deficit. She exhibits normal muscle tone. Coordination and gait normal. GCS eye subscore is 4. GCS verbal subscore is 5. GCS motor subscore is 6.   Reflex Scores:       Tricep reflexes are 2+ on the right side and 2+ on the left side.       Bicep reflexes are 2+ on the right side and 2+ on the left side.       Brachioradialis reflexes are 2+ on the right side and 2+ on the left side.       Patellar reflexes are 2+ on the right side and 2+ on the left side.       Achilles reflexes are 2+ on the right side and 2+ on the left side.  Skin: Skin is warm, dry and intact. Lesion noted.   Multiple cutaneous neurofibromas   Psychiatric: She has a normal mood and affect. Her speech is normal and behavior is normal. Judgment and thought content normal. Cognition and memory are normal.   Nursing note and vitals reviewed.        Assessment/Plan   Sandy was seen today for migraine and headache.    Diagnoses and all orders for this visit:    Neurofibromatosis (CMS/HCC)    Low-grade optic pathway glioma (CMS/HCC)  -     MRI Brain With & Without Contrast; Future    Migraine with aura and without status migrainosus, not intractable    BMI 38.0-38.9,adult    Today's findings and recommendations are reviewed with the patient.  I recommended a follow-up MRI for optic glioma.  She will continue with Emgality.  Medication directions, expectations and potential side effects are reviewed with the patient.  10 minutes of a 15-minute outpatient visit was spent in counseling and  coordination of care today.      Dictated utilizing Dragon dictation.

## 2020-01-09 NOTE — PROGRESS NOTES
HPI:  Romy Banks is in for yearly follow-up breast check. She has not noticed any changes in her breasts. EXAMINATION: [facility] DIGITAL SCREEN W OR WO CAD BILATERAL 1/9/2020 4:24 PM   HISTORY: Annual screening exam. No current breast complaints. Family   history of breast cancer in mother. FINDINGS:   Bilateral digital CC and MLO views obtained. Uma Wright is made to   exams dated 12/17/2018, 12/15/2017, 4/26/2016, 3/12/2015. 3-D   tomosynthesis views also obtained.  Computer-aided detection   technology was utilized for assessment of this examination. There are scattered areas of fibroglandular density (Category B). No   dominant mass, architectural distortion, or suspicious appearing   microcalcifications within either breast.       Impression   1.  No mammographic evidence of malignancy within either breast.   Annual screening mammogram is recommended. 2.  BI-RADS category 1- Negative. BREAST EXAM:  On examination, she has fibrocystic changes throughout both breasts, no dominant masses, no skin or nipple changes and no axillary adenopathy. I see nothing suspicious for breast cancer. ASSESSMENT:  Benign fibrocystic changes              PLAN:  I will plan to see her back in 1 year for physical exam and bilateral mammograms. She will contact me if anything significant changes. 15 minutes spent,  which includes face to face with patient, record review, evaluation, planning, and education.

## 2020-02-10 ENCOUNTER — HOSPITAL ENCOUNTER (OUTPATIENT)
Dept: MRI IMAGING | Facility: HOSPITAL | Age: 45
Discharge: HOME | End: 2020-02-10
Admitting: PHYSICIAN ASSISTANT

## 2020-02-10 DIAGNOSIS — C72.30 LOW-GRADE OPTIC PATHWAY GLIOMA (HCC): ICD-10-CM

## 2020-02-10 LAB — CREAT BLDA-MCNC: 1.1 MG/DL (ref 0.6–1.3)

## 2020-02-10 PROCEDURE — 0 GADOBENATE DIMEGLUMINE 529 MG/ML SOLUTION: Performed by: PHYSICIAN ASSISTANT

## 2020-02-10 PROCEDURE — A9577 INJ MULTIHANCE: HCPCS | Performed by: PHYSICIAN ASSISTANT

## 2020-02-10 PROCEDURE — 82565 ASSAY OF CREATININE: CPT

## 2020-02-10 PROCEDURE — 70553 MRI BRAIN STEM W/O & W/DYE: CPT

## 2020-02-10 RX ADMIN — GADOBENATE DIMEGLUMINE 20 ML: 529 INJECTION, SOLUTION INTRAVENOUS at 10:55

## 2020-03-03 DIAGNOSIS — G43.119 INTRACTABLE MIGRAINE WITH AURA WITHOUT STATUS MIGRAINOSUS: ICD-10-CM

## 2020-03-03 RX ORDER — AMLODIPINE BESYLATE 5 MG/1
TABLET ORAL
Qty: 30 TABLET | Refills: 5 | Status: SHIPPED | OUTPATIENT
Start: 2020-03-03 | End: 2020-08-20

## 2020-03-16 ENCOUNTER — TELEPHONE (OUTPATIENT)
Dept: NEUROLOGY | Facility: CLINIC | Age: 45
End: 2020-03-16

## 2020-03-16 NOTE — TELEPHONE ENCOUNTER
PATIENT STATES SHE WAS TOLD TO CALL BACK IN MARCH TO GET A PRIOR AUTH FOR HER EMGALITY       PLEASE ADVISE

## 2020-05-05 ENCOUNTER — TELEPHONE (OUTPATIENT)
Dept: NEUROLOGY | Facility: CLINIC | Age: 45
End: 2020-05-05

## 2020-05-06 ENCOUNTER — TELEPHONE (OUTPATIENT)
Dept: NEUROLOGY | Facility: CLINIC | Age: 45
End: 2020-05-06

## 2020-05-06 NOTE — TELEPHONE ENCOUNTER
I called Sandy to let her know that I have faxed in the appeal on her behalf but that I have not received any answer yet. She did voice understanding. I told her to feel free and call for an update anytime and that I will call her as soon as I know anything.

## 2020-05-08 NOTE — TELEPHONE ENCOUNTER
Left a message requesting she return my call.   
Provider: DEE DEE MCKEON  Caller: NEWTON CASTELLON  Relationship to Patient: SELF  Phone Number: [phone]    Reason for Call: PT IS CALLING TO SEE IF OFFICE HAS ANY SAMPLES OF THE MEDICATION EMGALITY THAT SHE CAN GET. COULD YOU PLEASE CALL HER [phone] AND LET HER KNOW      
Sandy said her mother has an appointment today and will  the sample.   
[id number] Comprehensive

## 2020-05-26 DIAGNOSIS — G43.119 INTRACTABLE MIGRAINE WITH AURA WITHOUT STATUS MIGRAINOSUS: ICD-10-CM

## 2020-05-26 RX ORDER — TOPIRAMATE 200 MG/1
CAPSULE, EXTENDED RELEASE ORAL
Qty: 30 CAPSULE | Refills: 5 | Status: SHIPPED | OUTPATIENT
Start: 2020-05-26 | End: 2020-11-06 | Stop reason: SDUPTHER

## 2020-07-02 DIAGNOSIS — G43.109 MIGRAINE WITH AURA AND WITHOUT STATUS MIGRAINOSUS, NOT INTRACTABLE: ICD-10-CM

## 2020-07-02 RX ORDER — RIZATRIPTAN BENZOATE 5 MG/1
TABLET, ORALLY DISINTEGRATING ORAL
Qty: 9 TABLET | Refills: 0 | Status: SHIPPED | OUTPATIENT
Start: 2020-07-02 | End: 2020-11-29 | Stop reason: ALTCHOICE

## 2020-07-02 NOTE — PROGRESS NOTES
Subjective   Sandy Estes is a 45 y.o. female is here today for follow-up.    Emgality has resulted in significant improvement with regard to her headaches.  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    Headache    This is a chronic problem. The current episode started more than 1 year ago. The problem occurs daily. The problem has been gradually improving. The pain is located in the bilateral, frontal, parietal, vertex and occipital region. The pain radiates to the left neck and right neck. The pain quality is similar to prior headaches. The quality of the pain is described as aching, stabbing and shooting. The pain is severe. Associated symptoms include back pain, dizziness, neck pain, phonophobia, photophobia, rhinorrhea, scalp tenderness, sinus pressure and vomiting. Pertinent negatives include no weakness. The symptoms are aggravated by activity, bright light, fatigue, emotional stress and Valsalva. She has tried acetaminophen, antidepressants, Excedrin, darkened room, NSAIDs, oxygen, oral narcotics and injectable narcotics (Emgality) for the symptoms. The treatment provided significant relief. Her past medical history is significant for migraine headaches and migraines in the family.   Migraine    This is a chronic problem. The current episode started more than 1 year ago. The problem occurs intermittently. The problem has been rapidly improving. The pain is located in the bilateral, frontal and retro-orbital region. The pain does not radiate. The pain quality is similar to prior headaches. The quality of the pain is described as aching, throbbing and pulsating. The pain is severe. Associated symptoms include back pain, dizziness, neck pain, phonophobia, photophobia, rhinorrhea, scalp tenderness, sinus pressure and vomiting. Pertinent negatives include no weakness. The symptoms are aggravated by activity, bright light, fatigue, emotional stress,  noise and Valsalva. She has tried acetaminophen, antidepressants, Excedrin, darkened room and NSAIDs (Emgality) for the symptoms. The treatment provided significant relief. Her past medical history is significant for migraine headaches and migraines in the family.       The following portions of the patient's history were reviewed and updated as appropriate: allergies, current medications, past family history, past medical history, past social history, past surgical history and problem list.    Review of Systems   Constitutional: Positive for fatigue.   HENT: Positive for postnasal drip, rhinorrhea, sinus pressure and sinus pain.    Eyes: Positive for photophobia and visual disturbance.   Respiratory: Negative.    Cardiovascular: Negative.    Gastrointestinal: Positive for vomiting.   Endocrine: Negative.    Genitourinary: Negative.    Musculoskeletal: Positive for back pain, gait problem and neck pain.   Skin: Negative.         Cutaneous neurofibromas   Allergic/Immunologic: Negative.    Neurological: Positive for dizziness and headaches. Negative for syncope and weakness.   Hematological: Negative.    Psychiatric/Behavioral: Positive for dysphoric mood and sleep disturbance. The patient is nervous/anxious.        Objective   Physical Exam   Constitutional: She is oriented to person, place, and time. Vital signs are normal.   HENT:   Head: Normocephalic.   Right Ear: Hearing and external ear normal.   Left Ear: Hearing and external ear normal.   Nose: Nose normal.   Mouth/Throat: Uvula is midline, oropharynx is clear and moist and mucous membranes are normal.   Eyes: Pupils are equal, round, and reactive to light. Conjunctivae, EOM and lids are normal. No scleral icterus.   Fundoscopic exam:       The right eye shows no hemorrhage and no papilledema. The right eye shows red reflex.        The left eye shows no hemorrhage and no papilledema. The left eye shows red reflex.   Neck: Trachea normal, normal range of  motion and phonation normal. No JVD present. Carotid bruit is not present.   Cardiovascular: Normal rate and normal heart sounds.   Pulmonary/Chest: Effort normal and breath sounds normal.   Musculoskeletal: She exhibits no edema or tenderness.        Lumbar back: She exhibits decreased range of motion and pain.   Lymphadenopathy:     She has no cervical adenopathy.   Neurological: She is alert and oriented to person, place, and time. She has normal strength. She displays no atrophy and no tremor. No cranial nerve deficit or sensory deficit. She exhibits normal muscle tone. Coordination and gait normal. GCS eye subscore is 4. GCS verbal subscore is 5. GCS motor subscore is 6.   Reflex Scores:       Tricep reflexes are 2+ on the right side and 2+ on the left side.       Bicep reflexes are 2+ on the right side and 2+ on the left side.       Brachioradialis reflexes are 2+ on the right side and 2+ on the left side.       Patellar reflexes are 2+ on the right side and 2+ on the left side.       Achilles reflexes are 2+ on the right side and 2+ on the left side.  Skin: Skin is warm, dry and intact. Lesion noted.   Multiple cutaneous neurofibromas   Psychiatric: She has a normal mood and affect. Her speech is normal and behavior is normal. Judgment and thought content normal. Cognition and memory are normal.   Nursing note and vitals reviewed.        Assessment/Plan   Sandy was seen today for migraine and headache.    Diagnoses and all orders for this visit:    Low-grade optic pathway glioma (CMS/HCC)    Neurofibromatosis (CMS/HCC)    Migraine with aura and without status migrainosus, not intractable  -     ubrogepant (ubrogepant) 50 MG tablet; Take 1 tablet by mouth As Needed (Migraine). May repeat in 2 hours if needed, max 2 tabs in 24 hours.    Chronic daily headache    Chronic maxillary sinusitis  -     Ambulatory Referral to ENT (Otolaryngology)    No changes are noted in the status of her low-grade optic pathway  glioma or neurofibromatosis.  These conditions are discussed with the patient.    It is recommended that the patient start Ubrelvey 50 mg episodically as her present therapy is inadequate.    The patient does have evidence for chronic maxillary sinusitis and has requested a referral to ENT.  I suggest that she see Dr. Ramirez at [facility].    20 minutes of a 25-minute outpatient visit was spent in counseling and coordination of care today.    Dictated utilizing Dragon dictation.

## 2020-07-06 ENCOUNTER — OFFICE VISIT (OUTPATIENT)
Dept: NEUROLOGY | Facility: CLINIC | Age: 45
End: 2020-07-06

## 2020-07-06 VITALS
BODY MASS INDEX: 37.91 KG/M2 | HEIGHT: 62 IN | DIASTOLIC BLOOD PRESSURE: 78 MMHG | OXYGEN SATURATION: 98 % | HEART RATE: 58 BPM | SYSTOLIC BLOOD PRESSURE: 126 MMHG | WEIGHT: 206 LBS

## 2020-07-06 DIAGNOSIS — R51.9 CHRONIC DAILY HEADACHE: ICD-10-CM

## 2020-07-06 DIAGNOSIS — G43.109 MIGRAINE WITH AURA AND WITHOUT STATUS MIGRAINOSUS, NOT INTRACTABLE: ICD-10-CM

## 2020-07-06 DIAGNOSIS — J32.0 CHRONIC MAXILLARY SINUSITIS: ICD-10-CM

## 2020-07-06 DIAGNOSIS — C72.30 LOW-GRADE OPTIC PATHWAY GLIOMA (HCC): Primary | ICD-10-CM

## 2020-07-06 DIAGNOSIS — Q85.00 NEUROFIBROMATOSIS (HCC): ICD-10-CM

## 2020-07-06 PROCEDURE — 99214 OFFICE O/P EST MOD 30 MIN: CPT | Performed by: PHYSICIAN ASSISTANT

## 2020-07-06 RX ORDER — DICYCLOMINE HYDROCHLORIDE 10 MG/1
10 CAPSULE ORAL
COMMUNITY
Start: 2020-06-26 | End: 2021-03-08

## 2020-07-06 RX ORDER — HYDROXYZINE HYDROCHLORIDE 25 MG/1
25 TABLET, FILM COATED ORAL EVERY 4 HOURS PRN
COMMUNITY
Start: 2020-06-15 | End: 2021-01-21

## 2020-07-06 RX ORDER — ESCITALOPRAM OXALATE 20 MG/1
20 TABLET ORAL DAILY
COMMUNITY
Start: 2020-06-23 | End: 2020-11-06

## 2020-07-30 ENCOUNTER — TRANSCRIBE ORDERS (OUTPATIENT)
Dept: ADMINISTRATIVE | Facility: HOSPITAL | Age: 45
End: 2020-07-30

## 2020-07-30 DIAGNOSIS — Z01.818 PREOP TESTING: Primary | ICD-10-CM

## 2020-08-03 ENCOUNTER — LAB (OUTPATIENT)
Dept: LAB | Facility: HOSPITAL | Age: 45
End: 2020-08-03

## 2020-08-03 PROCEDURE — U0003 INFECTIOUS AGENT DETECTION BY NUCLEIC ACID (DNA OR RNA); SEVERE ACUTE RESPIRATORY SYNDROME CORONAVIRUS 2 (SARS-COV-2) (CORONAVIRUS DISEASE [COVID-19]), AMPLIFIED PROBE TECHNIQUE, MAKING USE OF HIGH THROUGHPUT TECHNOLOGIES AS DESCRIBED BY CMS-2020-01-R: HCPCS | Performed by: OTOLARYNGOLOGY

## 2020-08-03 PROCEDURE — C9803 HOPD COVID-19 SPEC COLLECT: HCPCS | Performed by: OTOLARYNGOLOGY

## 2020-08-04 LAB
COVID LABCORP PRIORITY: NORMAL
SARS-COV-2 RNA RESP QL NAA+PROBE: NOT DETECTED

## 2020-08-05 NOTE — PROGRESS NOTES
YOB: 1975  Location: [facility]  Location Address: [address] [place] [address], [facility] [address], [address] [place] [address]  Location Phone: [phone]    Chief Complaint   Patient presents with   • Sinus Problem   • Ear Problem     HISTORY OF PRESENT ILLNESS:     Sandy Estes is a  45 y.o.  female who complains of nasal drainage, nasal congestion, repeated sinusitis, sinus pressure, postnasal drip and allergy. The symptoms are localized to the bilateral nose. The patient has had moderate to severe symptoms. The symptoms have been relatively constant for the last several years with worsening symptoms over the last year. The symptoms are aggravated by  allergy and weather change. The symptoms are improved by no identifiable factors.    The patient has been allergy tested in the past by Dr. Gupta and was recommended to have immunotherapy, but did not proceed at that time.    History of Present Illness  Study Result     EXAM: MR BRAIN WITHOUT AND WITH IV CONTRAST 02/10/2020     COMPARISON: Multiple MRI brain, latest dated 2019      HISTORY: 44 years-old Female. Optic Glioma; C72.30-Malignant neoplasm of  unspecified optic nerve     TECHNIQUE:   Routine pulse sequences were obtained of the brain before and after the  administration of IV contrast.      REPORT: Since most recent MRI of the brain dated 2019, there has  been no significant interval changes. Specifically, there is  redemonstration of 7 mm nonenhancing focus of abnormal signal in the  right basal ganglia. This lesion shows no interval change in signal  characteristics or enhancement.     The previously seen 1.2 cm area of abnormal nonenhancing signal in the  posterior aspect of the optic chiasm also shows no interval change and  is favored to reflect an optic pathway glioma. It is better visualized  on the coronal high-resolution T2 sequences of today's examination  (image 87, series 1202). There is no new area of abnormal enhancement.  There is  no acute intracranial hemorrhage. No midline shift. No  suspicious extra-axial fluid collection.     Chronic opacification in the bilateral maxillary sinuses, right greater  than left.     IMPRESSION:  1.  No interval changes.  2.  Right globus pallidus nonenhancing focus, unchanged.  3.  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  This report was finalized on 02/10/2020 12:43 by Dr. Gely Barron MD.       Past Medical History:   Diagnosis Date   • Anxiety    • Depression    • Headache    • Hyperlipidemia    • Hypertension    • Neurofibromatosis (CMS/HCC)     6 months old dx       Past Surgical History:   Procedure Laterality Date   • CARDIAC SURGERY     • GALLBLADDER SURGERY     • HYSTERECTOMY     • OTHER SURGICAL HISTORY      repaired infundibular & valvular PS: res. trivial PS and Mild to Mod. PI (age 8)   • TONSILLECTOMY AND ADENOIDECTOMY         Outpatient Medications Marked as Taking for the 8/6/20 encounter (Office Visit) with Bairon Ramirez MD   Medication Sig Dispense Refill   • ALPRAZolam (XANAX) 1 MG tablet Take 1 mg by mouth 3 (Three) Times a Day As Needed for Anxiety.     • amLODIPine (NORVASC) 5 MG tablet TAKE ONE TABLET BY MOUTH EVERY DAY 30 tablet 5   • carvedilol (COREG) 6.25 MG tablet Take 6.25 mg by mouth 2 (Two) Times a Day With Meals.     • cetirizine (zyrTEC) 10 MG tablet Take 10 mg by mouth Daily.     • diclofenac (VOLTAREN) 75 MG EC tablet      • dicyclomine (BENTYL) 10 MG capsule Take 10 mg by mouth 4 (Four) Times a Day Before Meals & at Bedtime.     • DULoxetine (CYMBALTA) 60 MG capsule Take 90 mg by mouth Daily.     • escitalopram (LEXAPRO) 20 MG tablet Take 20 mg by mouth Daily.     • fenofibrate (TRICOR) 54 MG tablet Take 1 tablet by mouth Daily.     • FIBER COMPLETE PO Take  by mouth.     • galcanezumab-gnlm (EMGALITY) 120 MG/ML prefilled syringe Inject 1 mL under the skin into the appropriate area as directed Every 30 (Thirty) Days. 1.12 mL 5   • hydrOXYzine (ATARAX) 25  MG tablet Take 25 mg by mouth Every 4 (Four) Hours As Needed.     • losartan (COZAAR) 100 MG tablet Take 100 mg by mouth Daily.     • meloxicam (MOBIC) 15 MG tablet Take 15 mg by mouth Daily As Needed.     • memantine (NAMENDA) 10 MG tablet TAKE ONE TABLET BY MOUTH TWICE A DAY 60 tablet 5   • montelukast (SINGULAIR) 10 MG tablet      • pantoprazole (PROTONIX) 40 MG EC tablet Take 40 mg by mouth Daily.     • rizatriptan MLT (MAXALT-MLT) 5 MG disintegrating tablet DISSOLVE 1 TABLET UNDER TONGUE FOR HEADACHE ...MAY REPEAT IN 2 HRS IF NEEDED..NO MORE THAN 2/24HRS! 9 tablet 0   • tiZANidine (ZANAFLEX) 2 MG tablet      • TROKENDI  MG capsule sustained-release 24 hr TAKE ONE CAPSULE BY MOUTH EVERY DAY 30 capsule 5   • ubrogepant (ubrogepant) 50 MG tablet Take 1 tablet by mouth As Needed (Migraine). May repeat in 2 hours if needed, max 2 tabs in 24 hours. 10 tablet 12   • zolpidem (AMBIEN) 10 MG tablet Take 10 mg by mouth At Night As Needed for Sleep.         Penicillin g; Penicillins; Amoxil [amoxicillin]; Biaxin [clarithromycin]; Cefzil [cefprozil]; Doxycycline; Lisinopril; Other; Sulfa antibiotics; Sulfasalazine; Vancomycin; and Zofran [ondansetron hcl]    Family History   Problem Relation Age of Onset   • Breast cancer Mother    • Heart disease Father    • Hypertension Father        Social History     Socioeconomic History   • Marital status: Single     Spouse name: Not on file   • Number of children: Not on file   • Years of education: Not on file   • Highest education level: Not on file   Tobacco Use   • Smoking status: Former Smoker     Types: Cigarettes   • Smokeless tobacco: Never Used   Substance and Sexual Activity   • Alcohol use: No   • Drug use: No   • Sexual activity: Defer       Review of Systems   Constitutional: Negative for activity change, appetite change, chills, diaphoresis, fatigue, fever and unexpected weight change.   HENT: Positive for congestion, postnasal drip and sinus pressure. Negative  for dental problem, drooling, ear discharge, ear pain, facial swelling, hearing loss, mouth sores, nosebleeds, rhinorrhea, sneezing, sore throat, tinnitus, trouble swallowing and voice change.    Eyes: Negative.    Respiratory: Negative.    Cardiovascular: Negative.    Gastrointestinal: Negative.    Endocrine: Negative.    Skin: Negative.    Allergic/Immunologic: Positive for environmental allergies. Negative for food allergies and immunocompromised state.   Neurological: Negative.    Hematological: Negative.    Psychiatric/Behavioral: Negative.        Vitals:    08/06/20 1449   BP: 114/68   Pulse: 92   Temp: 97.8 °F (36.6 °C)       Body mass index is 38.34 kg/m².    Objective     Physical Exam  Physical Exam  CONSTITUTIONAL: well nourished, alert, oriented, in no acute distress      COMMUNICATION AND VOICE: able to communicate normally, normal voice quality     HEAD: normocephalic, no lesions, atraumatic, no tenderness, no masses      FACE: appearance normal, no lesions, no tenderness, no deformities, facial motion symmetric     SALIVARY GLANDS: parotid glands with no tenderness, no swelling, no masses, submandibular glands with normal size, nontender     EYES: ocular motility normal, eyelids normal, orbits normal, no proptosis, conjunctiva normal , pupils equal, round      EARS:  Hearing: response to conversational voice normal bilaterally   External Ears: auricles without lesions  Otoscopic: tympanic membrane appearance normal, no lesions, no perforation, normal mobility, no fluid     NOSE:  External Nose: structure normal, no tenderness on palpation, no nasal discharge, no lesions, no evidence of trauma, nostrils patent   Intranasal Exam: nasal mucosa erythema and edema, vestibule within normal limits, inferior turbinate hypertrophy, nasal septum midline      ORAL:  Lips: upper and lower lips without lesion   Teeth: dentition within normal limits for age   Gums: gingivae healthy   Oral Mucosa: oral mucosa  normal, no mucosal lesions   Floor of Mouth: Warthin’s duct patent, mucosa normal  Tongue: lingual mucosa normal without lesions, normal tongue mobility   Palate: soft and hard palates with normal mucosa and structure  Oropharynx: oropharyngeal mucosa erythema with postnasal drainage     NECK: neck appearance normal, no mass,  noted without erythema or tenderness     THYROID: no overt thyromegaly, no tenderness, nodules or mass present on palpation, position midline      LYMPH NODES: no lymphadenopathy     CHEST/RESPIRATORY: respiratory effort normaL     CARDIOVASCULAR: extremities without cyanosis or edema       NEUROLOGIC/PSYCHIATRIC: oriented to time, place and person, mood normal, affect appropriate, CN II-XII intact grossly    Assessment/Plan   Problems Addressed this Visit        Respiratory    Chronic maxillary sinusitis - Primary    Relevant Medications    montelukast (SINGULAIR) 10 MG tablet    fluticasone (FLONASE) 50 MCG/ACT nasal spray    azelastine (ASTELIN) 0.1 % nasal spray    Other Relevant Orders    CT sinus wo contrast    Allergic rhinitis    Relevant Medications    diclofenac (VOLTAREN) 75 MG EC tablet    montelukast (SINGULAIR) 10 MG tablet    fluticasone (FLONASE) 50 MCG/ACT nasal spray    azelastine (ASTELIN) 0.1 % nasal spray    Other Relevant Orders    CT sinus wo contrast    Hypertrophy of both inferior nasal turbinates    Relevant Medications    montelukast (SINGULAIR) 10 MG tablet    fluticasone (FLONASE) 50 MCG/ACT nasal spray    azelastine (ASTELIN) 0.1 % nasal spray    Other Relevant Orders    CT sinus wo contrast        * Surgery not found *  Orders Placed This Encounter   Procedures   • CT sinus wo contrast     Standing Status:   Future     Standing Expiration Date:   8/6/2021     Order Specific Question:   Patient Pregnant     Answer:   No     Order Specific Question:   Is this exam for pre-operative purposes?     Answer:   Yes     Comments:   stealth protocol for image guidance      Return in about 6 weeks (around 9/17/2020) for Recheck sinuses.       Patient Instructions   Will start nasal sprays daily and get sinus CT. Recheck in 6 weeks to discuss results and possible sinus surgery.

## 2020-08-06 ENCOUNTER — OFFICE VISIT (OUTPATIENT)
Dept: OTOLARYNGOLOGY | Facility: CLINIC | Age: 45
End: 2020-08-06

## 2020-08-06 VITALS
SYSTOLIC BLOOD PRESSURE: 114 MMHG | TEMPERATURE: 97.8 F | BODY MASS INDEX: 38.57 KG/M2 | WEIGHT: 209.6 LBS | HEART RATE: 92 BPM | DIASTOLIC BLOOD PRESSURE: 68 MMHG | HEIGHT: 62 IN

## 2020-08-06 DIAGNOSIS — J34.3 HYPERTROPHY OF BOTH INFERIOR NASAL TURBINATES: ICD-10-CM

## 2020-08-06 DIAGNOSIS — J32.0 CHRONIC MAXILLARY SINUSITIS: Primary | ICD-10-CM

## 2020-08-06 DIAGNOSIS — J30.9 ALLERGIC RHINITIS, UNSPECIFIED SEASONALITY, UNSPECIFIED TRIGGER: ICD-10-CM

## 2020-08-06 PROCEDURE — 99214 OFFICE O/P EST MOD 30 MIN: CPT | Performed by: PHYSICIAN ASSISTANT

## 2020-08-06 RX ORDER — TIZANIDINE 2 MG/1
2 TABLET ORAL NIGHTLY
COMMUNITY
Start: 2020-07-20

## 2020-08-06 RX ORDER — DICLOFENAC SODIUM 75 MG/1
TABLET, DELAYED RELEASE ORAL
Status: ON HOLD | COMMUNITY
Start: 2020-07-21 | End: 2020-10-09

## 2020-08-06 RX ORDER — FLUTICASONE PROPIONATE 50 MCG
2 SPRAY, SUSPENSION (ML) NASAL DAILY
Qty: 16 G | Refills: 11 | Status: SHIPPED | OUTPATIENT
Start: 2020-08-06 | End: 2020-10-09 | Stop reason: HOSPADM

## 2020-08-06 RX ORDER — MONTELUKAST SODIUM 10 MG/1
10 TABLET ORAL NIGHTLY
COMMUNITY
Start: 2020-07-21 | End: 2020-11-06

## 2020-08-06 RX ORDER — AZELASTINE 1 MG/ML
2 SPRAY, METERED NASAL 2 TIMES DAILY
Qty: 30 ML | Refills: 11 | Status: SHIPPED | OUTPATIENT
Start: 2020-08-06 | End: 2020-10-09 | Stop reason: HOSPADM

## 2020-08-06 NOTE — PATIENT INSTRUCTIONS
Will start nasal sprays daily and get sinus CT. Recheck in 6 weeks to discuss results and possible sinus surgery.

## 2020-08-14 ENCOUNTER — HOSPITAL ENCOUNTER (OUTPATIENT)
Dept: CT IMAGING | Facility: HOSPITAL | Age: 45
Discharge: HOME | End: 2020-08-14
Admitting: PHYSICIAN ASSISTANT

## 2020-08-14 DIAGNOSIS — J32.0 CHRONIC MAXILLARY SINUSITIS: ICD-10-CM

## 2020-08-14 DIAGNOSIS — J34.3 HYPERTROPHY OF BOTH INFERIOR NASAL TURBINATES: ICD-10-CM

## 2020-08-14 DIAGNOSIS — J30.9 ALLERGIC RHINITIS, UNSPECIFIED SEASONALITY, UNSPECIFIED TRIGGER: ICD-10-CM

## 2020-08-14 PROCEDURE — 70486 CT MAXILLOFACIAL W/O DYE: CPT

## 2020-08-20 DIAGNOSIS — G43.119 INTRACTABLE MIGRAINE WITH AURA WITHOUT STATUS MIGRAINOSUS: ICD-10-CM

## 2020-08-20 RX ORDER — AMLODIPINE BESYLATE 5 MG/1
TABLET ORAL
Qty: 30 TABLET | Refills: 5 | Status: SHIPPED | OUTPATIENT
Start: 2020-08-20 | End: 2021-02-22

## 2020-09-11 ENCOUNTER — TRANSCRIBE ORDERS (OUTPATIENT)
Dept: ADMINISTRATIVE | Facility: HOSPITAL | Age: 45
End: 2020-09-11

## 2020-09-11 DIAGNOSIS — Z01.818 PRE-OP TESTING: Primary | ICD-10-CM

## 2020-09-14 ENCOUNTER — LAB (OUTPATIENT)
Dept: LAB | Facility: HOSPITAL | Age: 45
End: 2020-09-14

## 2020-09-14 PROCEDURE — U0003 INFECTIOUS AGENT DETECTION BY NUCLEIC ACID (DNA OR RNA); SEVERE ACUTE RESPIRATORY SYNDROME CORONAVIRUS 2 (SARS-COV-2) (CORONAVIRUS DISEASE [COVID-19]), AMPLIFIED PROBE TECHNIQUE, MAKING USE OF HIGH THROUGHPUT TECHNOLOGIES AS DESCRIBED BY CMS-2020-01-R: HCPCS | Performed by: OTOLARYNGOLOGY

## 2020-09-14 PROCEDURE — C9803 HOPD COVID-19 SPEC COLLECT: HCPCS | Performed by: OTOLARYNGOLOGY

## 2020-09-15 LAB
COVID LABCORP PRIORITY: NORMAL
SARS-COV-2 RNA RESP QL NAA+PROBE: NOT DETECTED

## 2020-09-16 NOTE — PROGRESS NOTES
YOB: 1975  Location: [facility]  Location Address: [address] [place] [address], [facility], [address] [place] [address]  Location Phone: [phone]    Chief Complaint   Patient presents with   • Follow-up       History of Present Illness  Sandy Estes is a  45 y.o.  female who presents for follow-up evaluation of complaints of nasal drainage, nasal congestion, repeated sinusitis, sinus pressure, postnasal drip, headaches and allergy. The symptoms are localized to the bilateral nose. The patient has had moderate to severe symptoms. The symptoms have been relatively constant for the last several years with worsening symptoms over the last year. The symptoms are aggravated by  allergy and weather change. The symptoms are improved by no identifiable factors.    The patient has been on extended rounds of antibiotics and multiple rounds of antibiotics and steroids without a resolution of symptoms.     The patient has been allergy tested in the past by Dr. Gupta and was recommended to have immunotherapy, but did not proceed at that time.    The patient was started on Astelin, Flonase, and Singulair at her last office visit with little improvement overall.         Study Result      EXAM: MR BRAIN WITHOUT AND WITH IV CONTRAST 02/10/2020     COMPARISON: Multiple MRI brain, latest dated 2019      HISTORY: 44 years-old Female. 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      REPORT: Since most recent MRI of the brain dated 2019, there has  been no significant interval changes. Specifically, there is  redemonstration of 7 mm nonenhancing focus of abnormal signal in the  right basal ganglia. This lesion shows no interval change in signal  characteristics or enhancement.     The previously seen 1.2 cm area of abnormal nonenhancing signal in the  posterior aspect of the  optic chiasm also shows no interval change and  is favored to reflect an optic pathway glioma. It is better visualized  on the coronal high-resolution T2 sequences of today's examination  (image 87, series 1202). There is no new area of abnormal enhancement.  There is no acute intracranial hemorrhage. No midline shift. No  suspicious extra-axial fluid collection.     Chronic opacification in the bilateral maxillary sinuses, right greater  than left.     IMPRESSION:  1.  No interval changes.  2.  Right globus pallidus nonenhancing focus, unchanged.  3.  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  This report was finalized on 02/10/2020 12:43 by Dr. Gely Barron MD.         Study Result    EXAMINATION:   CT SINUS WO CONTRAST-  8/14/2020 1:50 PM CDT     HISTORY: CT SINUS WO CONTRAST- 8/14/2020 12:10 PM CDT     HISTORY: Post-nasal drip     COMPARISON: NONE.      DOSE LENGTH PRODUCT: 474 mGy cm. Automated exposure control was also  utilized to decrease patient radiation dose.     TECHNIQUE: Helical tomographic images of the sinuses were obtained  without contrast. Multiplanar reformatted images were provided for  review.      FINDINGS:      Sinuses: Opacification of the right maxillary antrum is noted. There is  mucosal thickening in the left maxillary antrum. Fluid is present  opacifying the left sphenoid sinus. The frontal sinuses are pneumatized     Osteomeatal units: The left ostiomeatal unit is patent fluid extends  into the right ostiomeatal unit.     Nasal septum: Midline.     Nasal turbinates: Normal in anatomy and appearance.     Temporal bones: Trace amount of fluid is noted tip of the left mastoid.  Right mastoid is pneumatized.     Orbits: Symmetric and unremarkable.     Other: No other significant findings.     IMPRESSION:  1. Sinusitis as described above.        2. Small left mastoid effusion           This report was finalized on 08/14/2020 13:54 by Dr. Roger Santoyo MD.          Past Medical  History:   Diagnosis Date   • Anxiety    • Depression    • Headache    • Hyperlipidemia    • Hypertension    • Neurofibromatosis (CMS/HCC)     6 months old dx       Past Surgical History:   Procedure Laterality Date   • CARDIAC SURGERY     • GALLBLADDER SURGERY     • HYSTERECTOMY     • OTHER SURGICAL HISTORY      repaired infundibular & valvular PS: res. trivial PS and Mild to Mod. PI (age 8)   • TONSILLECTOMY AND ADENOIDECTOMY         Outpatient Medications Marked as Taking for the 9/17/20 encounter (Office Visit) with Bairon Ramirez MD   Medication Sig Dispense Refill   • ALPRAZolam (XANAX) 1 MG tablet Take 1 mg by mouth 3 (Three) Times a Day As Needed for Anxiety.     • amLODIPine (NORVASC) 5 MG tablet TAKE ONE TABLET BY MOUTH EVERY DAY 30 tablet 5   • azelastine (ASTELIN) 0.1 % nasal spray 2 sprays into the nostril(s) as directed by provider 2 (Two) Times a Day. Use in each nostril as directed 30 mL 11   • carvedilol (COREG) 6.25 MG tablet Take 6.25 mg by mouth 2 (Two) Times a Day With Meals.     • cetirizine (zyrTEC) 10 MG tablet Take 10 mg by mouth Daily.     • diclofenac (VOLTAREN) 75 MG EC tablet      • dicyclomine (BENTYL) 10 MG capsule Take 10 mg by mouth 4 (Four) Times a Day Before Meals & at Bedtime.     • DULoxetine (CYMBALTA) 60 MG capsule Take 90 mg by mouth Daily.     • escitalopram (LEXAPRO) 20 MG tablet Take 20 mg by mouth Daily.     • fenofibrate (TRICOR) 54 MG tablet Take 1 tablet by mouth Daily.     • FIBER COMPLETE PO Take  by mouth.     • fluticasone (FLONASE) 50 MCG/ACT nasal spray 2 sprays into the nostril(s) as directed by provider Daily. 16 g 11   • galcanezumab-gnlm (EMGALITY) 120 MG/ML prefilled syringe Inject 1 mL under the skin into the appropriate area as directed Every 30 (Thirty) Days. 1.12 mL 5   • hydrOXYzine (ATARAX) 25 MG tablet Take 25 mg by mouth Every 4 (Four) Hours As Needed.     • losartan (COZAAR) 100 MG tablet Take 100 mg by mouth Daily.     • meloxicam (MOBIC) 15  MG tablet Take 15 mg by mouth Daily As Needed.     • memantine (NAMENDA) 10 MG tablet TAKE ONE TABLET BY MOUTH TWICE A DAY 60 tablet 5   • montelukast (SINGULAIR) 10 MG tablet      • pantoprazole (PROTONIX) 40 MG EC tablet Take 40 mg by mouth Daily.     • rizatriptan MLT (MAXALT-MLT) 5 MG disintegrating tablet DISSOLVE 1 TABLET UNDER TONGUE FOR HEADACHE ...MAY REPEAT IN 2 HRS IF NEEDED..NO MORE THAN 2/24HRS! 9 tablet 0   • tiZANidine (ZANAFLEX) 2 MG tablet      • TROKENDI  MG capsule sustained-release 24 hr TAKE ONE CAPSULE BY MOUTH EVERY DAY 30 capsule 5   • ubrogepant (ubrogepant) 50 MG tablet Take 1 tablet by mouth As Needed (Migraine). May repeat in 2 hours if needed, max 2 tabs in 24 hours. 10 tablet 12   • zolpidem (AMBIEN) 10 MG tablet Take 10 mg by mouth At Night As Needed for Sleep.         Penicillin g, Penicillins, Amoxil [amoxicillin], Biaxin [clarithromycin], Cefzil [cefprozil], Doxycycline, Lisinopril, Other, Sulfa antibiotics, Sulfasalazine, Vancomycin, and Zofran [ondansetron hcl]    Family History   Problem Relation Age of Onset   • Breast cancer Mother    • Heart disease Father    • Hypertension Father        Social History     Socioeconomic History   • Marital status: Single     Spouse name: Not on file   • Number of children: Not on file   • Years of education: Not on file   • Highest education level: Not on file   Tobacco Use   • Smoking status: Former Smoker     Types: Cigarettes   • Smokeless tobacco: Never Used   Substance and Sexual Activity   • Alcohol use: No   • Drug use: No   • Sexual activity: Defer       Review of Systems   Constitutional: Negative for activity change, appetite change, chills, diaphoresis, fatigue, fever and unexpected weight change.   HENT: Positive for congestion, ear pain, postnasal drip, sinus pressure and sinus pain. Negative for dental problem, drooling, ear discharge, facial swelling, hearing loss, mouth sores, nosebleeds, rhinorrhea, sneezing, sore  throat, tinnitus, trouble swallowing and voice change.    Eyes: Negative.    Respiratory: Negative.    Cardiovascular: Negative.    Gastrointestinal: Negative.    Endocrine: Negative.    Skin: Negative.    Allergic/Immunologic: Positive for environmental allergies. Negative for food allergies and immunocompromised state.   Neurological: Positive for headaches.   Hematological: Negative.    Psychiatric/Behavioral: Negative.        Vitals:    09/17/20 1300   BP: 153/87   Pulse: 87   Temp: 97.3 °F (36.3 °C)       Body mass index is 38.59 kg/m².    Objective     Physical Exam  CONSTITUTIONAL: well nourished, alert, oriented, in no acute distress      COMMUNICATION AND VOICE: able to communicate normally, normal voice quality     HEAD: normocephalic, no lesions, atraumatic, no tenderness, no masses      FACE: appearance normal, no lesions, no tenderness, no deformities, facial motion symmetric     SALIVARY GLANDS: parotid glands with no tenderness, no swelling, no masses, submandibular glands with normal size, nontender     EYES: ocular motility normal, eyelids normal, orbits normal, no proptosis, conjunctiva normal , pupils equal, round      EARS:  Hearing: response to conversational voice normal bilaterally   External Ears: auricles without lesions  Otoscopic: tympanic membrane appearance normal, no lesions, no perforation, normal mobility, no fluid     NOSE:  External Nose: structure normal, no tenderness on palpation, no nasal discharge, no lesions, no evidence of trauma, nostrils patent   Intranasal Exam: nasal mucosa erythema and edema, vestibule within normal limits, inferior turbinate hypertrophy, nasal septum midline      ORAL:  Lips: upper and lower lips without lesion   Teeth: dentition within normal limits for age   Gums: gingivae healthy   Oral Mucosa: oral mucosa normal, no mucosal lesions   Floor of Mouth: Warthin’s duct patent, mucosa normal  Tongue: lingual mucosa normal without lesions, normal tongue  mobility   Palate: soft and hard palates with normal mucosa and structure  Oropharynx: oropharyngeal mucosa erythema with postnasal drainage     NECK: neck appearance normal, no mass,  noted without erythema or tenderness     THYROID: no overt thyromegaly, no tenderness, nodules or mass present on palpation, position midline      LYMPH NODES: no lymphadenopathy     CHEST/RESPIRATORY: respiratory effort normaL     CARDIOVASCULAR: extremities without cyanosis or edema       NEUROLOGIC/PSYCHIATRIC: oriented to time, place and person, mood normal, affect appropriate, CN II-XII intact grossly      Assessment/Plan   Problems Addressed this Visit        Respiratory    Chronic maxillary sinusitis    Relevant Orders    Case Request (Completed)    Comprehensive Metabolic Panel    CBC (No Diff)    ECG 12 Lead    XR Chest 2 View    Allergic rhinitis    Relevant Medications    methylPREDNISolone (Medrol) 4 MG dose pack    Other Relevant Orders    Case Request (Completed)    Comprehensive Metabolic Panel    CBC (No Diff)    ECG 12 Lead    XR Chest 2 View    Hypertrophy of both inferior nasal turbinates - Primary    Relevant Orders    Case Request (Completed)    Comprehensive Metabolic Panel    CBC (No Diff)    ECG 12 Lead    XR Chest 2 View    Other acute recurrent sinusitis    Relevant Medications    azithromycin (ZITHROMAX) 250 MG tablet    methylPREDNISolone (Medrol) 4 MG dose pack    Other Relevant Orders    Case Request (Completed)    Comprehensive Metabolic Panel    CBC (No Diff)    ECG 12 Lead    XR Chest 2 View    Chronic sphenoidal sinusitis    Relevant Orders    Case Request (Completed)    Comprehensive Metabolic Panel    CBC (No Diff)    ECG 12 Lead    XR Chest 2 View        ENDOSCOPIC FUNCTIONAL SINUS SURGERY W TRACKING, BILATERAL MAXILLARY AND LEFT SPHENOID SINUPLASTY,  RESECT INFERIOR TURBINATES VIA COBLATION (Bilateral)  Orders Placed This Encounter   Procedures   • XR Chest 2 View     Standing Status:   Future      Standing Expiration Date:   9/17/2021     Order Specific Question:   Reason for Exam:     Answer:   HYPERTENSION     Order Specific Question:   Patient Pregnant     Answer:   No   • Comprehensive Metabolic Panel     Standing Status:   Future     Standing Expiration Date:   9/17/2021   • CBC (No Diff)     Standing Status:   Future     Standing Expiration Date:   9/17/2021   • Follow Anesthesia Guidelines / Standing Orders     Standing Status:   Future   • Obtain Informed Consent     Order Specific Question:   Informed Consent Given For     Answer:   ENDOSCOPIC FUNCTIONAL SINUS SURGERY W TRACKING, BILATERAL MAXILLARY AND LEFT SPHENOID SINUPLASTY,  RESECT INFERIOR TURBINATES VIA COBLATION   • Provide Patient With Instructions on NPO Status     Standing Status:   Future   • ECG 12 Lead     Standing Status:   Future     Standing Expiration Date:   9/17/2021     Order Specific Question:   Reason for Exam:     Answer:   HYPERTENSION     Return for Follow-up post-operatively as directed.       Patient Instructions   FUNCTIONAL ENDOSCOPIC SINUS SURGERY WITH TURBINATE REDUCTION: A functional endoscopic sinus surgery was recommended. The risks and benefits were explained including but not limited to pain, bleeding (with the possible need for nasal packing), infection, risks of the general anesthesia, orbital injury with blurred vision or visual loss, cerebrospinal fluid leak, persistent disease, scarring, synichiae and the possibility for the need of reoperation. Possibilities of additional sinus work or less sinus work depending on the status of the nose at the time of the operation was discussed. Alternatives were discussed. No guarantees were made or implied. Questions were asked appropriately answered.      Start antibiotic and steroid on October 4th or 5th.    Dr. Ramirez examined patient and agrees with assessment and plan.    [place] Exercises    Exercises for the management of vertigo  These exercises are  designed to stimulate or “work” the vestibular system and eventually lessen vertigo during daily activities.  Each exercise is to be done at least twice a day beginning with 5 repetitions each and increasing to 10 repetitions, if you are able.    A.  Head and eye movements while sittin. Keeping head still, look up and then down.  2. Keeping head still, look side to side.  3. Hold a finger out at arm’s length.  Focus on your finger and bring it toward your nose, then back again.  4. Move your head slowly side to side with your eyes open.  5. Move your head quickly side to side.  6. Move your head slowly up and down with your eyes closed.  7. Move your head quickly up and down.  8. Repeat numbers 4 through 7 with your eyes closed.    B.  Head and body movements while sittin. Place an object on the floor in front of you.  Reach down to pick it up, then return to an upright position.  Remember to look down at the object, then look back up when you bring your trunk back up.  2. Bend forward and pass the object back and forth under your knees.    C.  1.   Go from a sitting to standing position, then return to sitting.  2. Repeat this with eyes closed.  3.   Repeat number 1, but turn a full [place] while standing before sitting down.    Other Activities to improve balance:  1. Walk up and down stairs carefully with your eyes open, then closed.  Hold onto a handrail for safety, if needed.  2. While standing, practice making sudden 90 degree turns first with the eyes open, then closed.  3. While walking, look side to side.  This is best done in a grocery store;  read labels as you walk down the isle.  4. Practice standing on one foot; first with the eyes open, then closed.  5. Standing on a soft surface, such as a egg crate mattress, pillow or foam mat:  a. First walk across the surface to get used to it.  b. If you have room walk heel to toe with the eyes open, then closed.  c. Practice standing on one foot with  the eyes open, then closed.

## 2020-09-17 ENCOUNTER — OFFICE VISIT (OUTPATIENT)
Dept: OTOLARYNGOLOGY | Facility: CLINIC | Age: 45
End: 2020-09-17

## 2020-09-17 VITALS
DIASTOLIC BLOOD PRESSURE: 87 MMHG | BODY MASS INDEX: 38.83 KG/M2 | HEART RATE: 87 BPM | SYSTOLIC BLOOD PRESSURE: 153 MMHG | HEIGHT: 62 IN | TEMPERATURE: 97.3 F | WEIGHT: 211 LBS

## 2020-09-17 DIAGNOSIS — J30.9 ALLERGIC RHINITIS, UNSPECIFIED SEASONALITY, UNSPECIFIED TRIGGER: ICD-10-CM

## 2020-09-17 DIAGNOSIS — J32.3 CHRONIC SPHENOIDAL SINUSITIS: ICD-10-CM

## 2020-09-17 DIAGNOSIS — J34.3 HYPERTROPHY OF BOTH INFERIOR NASAL TURBINATES: Primary | ICD-10-CM

## 2020-09-17 DIAGNOSIS — J32.0 CHRONIC MAXILLARY SINUSITIS: ICD-10-CM

## 2020-09-17 DIAGNOSIS — J01.81 OTHER ACUTE RECURRENT SINUSITIS: ICD-10-CM

## 2020-09-17 PROCEDURE — 99214 OFFICE O/P EST MOD 30 MIN: CPT | Performed by: PHYSICIAN ASSISTANT

## 2020-09-17 RX ORDER — AZITHROMYCIN 250 MG/1
TABLET, FILM COATED ORAL
Qty: 6 TABLET | Refills: 0 | Status: SHIPPED | OUTPATIENT
Start: 2020-09-17 | End: 2020-10-12

## 2020-09-17 RX ORDER — OXYMETAZOLINE HYDROCHLORIDE 0.05 G/100ML
2 SPRAY NASAL
Status: CANCELLED | OUTPATIENT
Start: 2020-09-17 | End: 2020-09-20

## 2020-09-17 RX ORDER — METHYLPREDNISOLONE 4 MG/1
TABLET ORAL
Qty: 1 EACH | Refills: 0 | Status: ON HOLD | OUTPATIENT
Start: 2020-09-17 | End: 2020-10-09

## 2020-09-17 NOTE — PATIENT INSTRUCTIONS
FUNCTIONAL ENDOSCOPIC SINUS SURGERY WITH TURBINATE REDUCTION: A functional endoscopic sinus surgery was recommended. The risks and benefits were explained including but not limited to pain, bleeding (with the possible need for nasal packing), infection, risks of the general anesthesia, orbital injury with blurred vision or visual loss, cerebrospinal fluid leak, persistent disease, scarring, synichiae and the possibility for the need of reoperation. Possibilities of additional sinus work or less sinus work depending on the status of the nose at the time of the operation was discussed. Alternatives were discussed. No guarantees were made or implied. Questions were asked appropriately answered.      Start antibiotic and steroid on  or .    Dr. Ramirez examined patient and agrees with assessment and plan.    [place] - [place] Exercises    Exercises for the management of vertigo  These exercises are designed to stimulate or “work” the vestibular system and eventually lessen vertigo during daily activities.  Each exercise is to be done at least twice a day beginning with 5 repetitions each and increasing to 10 repetitions, if you are able.    A.  Head and eye movements while sittin. Keeping head still, look up and then down.  2. Keeping head still, look side to side.  3. Hold a finger out at arm’s length.  Focus on your finger and bring it toward your nose, then back again.  4. Move your head slowly side to side with your eyes open.  5. Move your head quickly side to side.  6. Move your head slowly up and down with your eyes closed.  7. Move your head quickly up and down.  8. Repeat numbers 4 through 7 with your eyes closed.    B.  Head and body movements while sittin. Place an object on the floor in front of you.  Reach down to pick it up, then return to an upright position.  Remember to look down at the object, then look back up when you bring your trunk back up.  2. Bend forward and pass the  object back and forth under your knees.    C.  1.   Go from a sitting to standing position, then return to sitting.  2. Repeat this with eyes closed.  3.   Repeat number 1, but turn a full [place] while standing before sitting down.    Other Activities to improve balance:  1. Walk up and down stairs carefully with your eyes open, then closed.  Hold onto a handrail for safety, if needed.  2. While standing, practice making sudden 90 degree turns first with the eyes open, then closed.  3. While walking, look side to side.  This is best done in a grocery store;  read labels as you walk down the isle.  4. Practice standing on one foot; first with the eyes open, then closed.  5. Standing on a soft surface, such as a egg crate mattress, pillow or foam mat:  a. First walk across the surface to get used to it.  b. If you have room walk heel to toe with the eyes open, then closed.  c. Practice standing on one foot with the eyes open, then closed.

## 2020-10-02 ENCOUNTER — APPOINTMENT (OUTPATIENT)
Dept: PREADMISSION TESTING | Facility: HOSPITAL | Age: 45
End: 2020-10-02

## 2020-10-02 ENCOUNTER — TRANSCRIBE ORDERS (OUTPATIENT)
Dept: ADMINISTRATIVE | Facility: HOSPITAL | Age: 45
End: 2020-10-02

## 2020-10-02 ENCOUNTER — HOSPITAL ENCOUNTER (OUTPATIENT)
Dept: GENERAL RADIOLOGY | Facility: HOSPITAL | Age: 45
Discharge: HOME | End: 2020-10-02

## 2020-10-02 VITALS
WEIGHT: 209.66 LBS | HEART RATE: 62 BPM | SYSTOLIC BLOOD PRESSURE: 115 MMHG | DIASTOLIC BLOOD PRESSURE: 60 MMHG | OXYGEN SATURATION: 98 % | RESPIRATION RATE: 18 BRPM | BODY MASS INDEX: 38.58 KG/M2 | HEIGHT: 62 IN

## 2020-10-02 DIAGNOSIS — J01.81 OTHER ACUTE RECURRENT SINUSITIS: ICD-10-CM

## 2020-10-02 DIAGNOSIS — J32.3 CHRONIC SPHENOIDAL SINUSITIS: ICD-10-CM

## 2020-10-02 DIAGNOSIS — J34.3 HYPERTROPHY OF BOTH INFERIOR NASAL TURBINATES: ICD-10-CM

## 2020-10-02 DIAGNOSIS — J32.0 CHRONIC MAXILLARY SINUSITIS: ICD-10-CM

## 2020-10-02 DIAGNOSIS — Z01.818 PRE-OP TESTING: Primary | ICD-10-CM

## 2020-10-02 DIAGNOSIS — J30.9 ALLERGIC RHINITIS, UNSPECIFIED SEASONALITY, UNSPECIFIED TRIGGER: ICD-10-CM

## 2020-10-02 LAB
ALBUMIN SERPL-MCNC: 4.1 G/DL (ref 3.5–5.2)
ALBUMIN/GLOB SERPL: 1.6 G/DL
ALP SERPL-CCNC: 103 U/L (ref 39–117)
ALT SERPL W P-5'-P-CCNC: 14 U/L (ref 1–33)
ANION GAP SERPL CALCULATED.3IONS-SCNC: 8 MMOL/L (ref 5–15)
AST SERPL-CCNC: 13 U/L (ref 1–32)
BILIRUB SERPL-MCNC: 0.2 MG/DL (ref 0–1.2)
BUN SERPL-MCNC: 23 MG/DL (ref 6–20)
BUN/CREAT SERPL: 23.5 (ref 7–25)
CALCIUM SPEC-SCNC: 9.1 MG/DL (ref 8.6–10.5)
CHLORIDE SERPL-SCNC: 113 MMOL/L (ref 98–107)
CO2 SERPL-SCNC: 22 MMOL/L (ref 22–29)
CREAT SERPL-MCNC: 0.98 MG/DL (ref 0.57–1)
DEPRECATED RDW RBC AUTO: 45.1 FL (ref 37–54)
ERYTHROCYTE [DISTWIDTH] IN BLOOD BY AUTOMATED COUNT: 13.3 % (ref 12.3–15.4)
GFR SERPL CREATININE-BSD FRML MDRD: 62 ML/MIN/1.73
GLOBULIN UR ELPH-MCNC: 2.6 GM/DL
GLUCOSE SERPL-MCNC: 106 MG/DL (ref 65–99)
HCT VFR BLD AUTO: 37.3 % (ref 34–46.6)
HGB BLD-MCNC: 12.1 G/DL (ref 12–15.9)
MCH RBC QN AUTO: 29.7 PG (ref 26.6–33)
MCHC RBC AUTO-ENTMCNC: 32.4 G/DL (ref 31.5–35.7)
MCV RBC AUTO: 91.4 FL (ref 79–97)
PLATELET # BLD AUTO: 173 10*3/MM3 (ref 140–450)
PMV BLD AUTO: 13 FL (ref 6–12)
POTASSIUM SERPL-SCNC: 4 MMOL/L (ref 3.5–5.2)
PROT SERPL-MCNC: 6.7 G/DL (ref 6–8.5)
RBC # BLD AUTO: 4.08 10*6/MM3 (ref 3.77–5.28)
SODIUM SERPL-SCNC: 143 MMOL/L (ref 136–145)
WBC # BLD AUTO: 8.77 10*3/MM3 (ref 3.4–10.8)

## 2020-10-02 PROCEDURE — 36415 COLL VENOUS BLD VENIPUNCTURE: CPT

## 2020-10-02 PROCEDURE — 93010 ELECTROCARDIOGRAM REPORT: CPT | Performed by: INTERNAL MEDICINE

## 2020-10-02 PROCEDURE — 93005 ELECTROCARDIOGRAM TRACING: CPT

## 2020-10-02 PROCEDURE — 71046 X-RAY EXAM CHEST 2 VIEWS: CPT

## 2020-10-02 PROCEDURE — 85027 COMPLETE CBC AUTOMATED: CPT | Performed by: PHYSICIAN ASSISTANT

## 2020-10-02 PROCEDURE — 80053 COMPREHEN METABOLIC PANEL: CPT | Performed by: PHYSICIAN ASSISTANT

## 2020-10-02 NOTE — DISCHARGE INSTRUCTIONS
DAY OF SURGERY INSTRUCTIONS        YOUR SURGEON: ***Dr. Ramirez    PROCEDURE: ***endoscopic sinus surgery    DATE OF SURGERY: ***10/9/2020    ARRIVAL TIME: AS DIRECTED BY OFFICE    YOU MAY TAKE THE FOLLOWING MEDICATION(S) THE MORNING OF SURGERY WITH A SIP OF WATER: ***carvedilol, amlodipine, alprazolam    ALL OTHER HOME MEDICATIONS CHECK WITH YOUR DOCTOR    DO NOT TAKE ANY ERECTILE DYSFUNCTION MEDICATIONS (EX:  CIALIS, VIAGRA) 24 HOURS PRIOR TO SURGERY              MANAGING PAIN AFTER SURGERY    We know you are probably wondering what your pain will be like after surgery.  Following surgery it is unrealistic to expect you will not have pain.   Pain is how our bodies let us know that something is wrong or cautions us to be careful.  That said, our goal is to make your pain tolerable.    Methods we may use to treat your pain include (oral or IV medications, PCAs, epidurals, nerve blocks, etc.)   While some procedures require IV pain medications for a short time after surgery, transitioning to pain medications by mouth allows for better management of pain.   Your nurse will encourage you to take oral pain medications whenever possible.  IV medications work almost immediately, but only last a short while.  Taking medications by mouth allows for a more constant level of medication in your blood stream for a longer period of time.      Once your pain is out of control it is harder to get back under control.  It is important you are aware when your next dose of pain medication is due.  If you are admitted, your nurse may write the time of your next dose on the white board in your room to help you remember.      We are interested in your pain and encourage you to inform us about aggravating factors during your visit.   Many times a simple repositioning every few hours can make a big difference.    If your physician says it is okay, do not let your pain prevent you from getting out of bed. Be sure to call your nurse for  assistance prior to getting up so you do not fall.      Before surgery, please decide your tolerable pain goal.  These faces help describe the pain ratings we use on a 0-10 scale.   Be prepared to tell us your goal and whether or not you take pain or anxiety medications at home.      BEFORE YOU COME TO THE HOSPITAL  (Pre-op instructions)  • Do not eat, drink, smoke or chew gum after midnight the night before surgery.  This also includes no mints.  • Morning of surgery take only the medicines you have been instructed with a sip of water unless otherwise instructed  by your physician.  • Do not shave, wear makeup or dark nail polish.  • Remove all jewelry including rings.  • Leave anything you consider valuable at home.  • Leave your suitcase in the car until after your surgery.  • Bring the following with you if applicable:  o Picture ID and insurance, Medicare or Medicaid cards  o Co-pay/deductible required by insurance (cash, check, credit card)  o Copy of advance directive, living will or power-of- documents if not brought to PAT  o CPAP or BIPAP mask and tubing  o Relaxation aids ( book, magazine), etc.  o Hearing aids                                 ON THE DAY OF SURGERY  · On the day of surgery check in at registration located at the main entrance of the hospital.   ? You will be registered and given a beeper with instructions where to wait in the main lobby.  ? When your beeper lights up and vibrates a member of the Outpatient Surgery staff will meet you at the double doors under the stair steps and escort you to your preoperative room.   · You may have cloth compression devices placed on your legs. These help to prevent blood clots and reduce swelling in your legs.  · An IV may be inserted into one of your veins.  · In the operating room, you may be given one or more of the following:  ? A medicine to help you relax (sedative).  ? A medicine to numb the area (local anesthetic).  ? A medicine to make  "you fall asleep (general anesthetic).  ? A medicine that is injected into an area of your body to numb everything below the injection site (regional anesthetic).  · Your surgical site will be marked or identified.  · You may be given an antibiotic through your IV to help prevent infection.  Contact a health care provider if you:  · Develop a fever of more than 100.4°F (38°C) or other feelings of illness during the 48 hours before your surgery.  · Have symptoms that get worse.  Have questions or concerns about your surgery    General Anesthesia/Surgery, Adult  General anesthesia is the use of medicines to make a person \"go to sleep\" (unconscious) for a medical procedure. General anesthesia must be used for certain procedures, and is often recommended for procedures that:  · Last a long time.  · Require you to be still or in an unusual position.  · Are major and can cause blood loss.  The medicines used for general anesthesia are called general anesthetics. As well as making you unconscious for a certain amount of time, these medicines:  · Prevent pain.  · Control your blood pressure.  · Relax your muscles.  Tell a health care provider about:  · Any allergies you have.  · All medicines you are taking, including vitamins, herbs, eye drops, creams, and over-the-counter medicines.  · Any problems you or family members have had with anesthetic medicines.  · Types of anesthetics you have had in the past.  · Any blood disorders you have.  · Any surgeries you have had.  · Any medical conditions you have.  · Any recent upper respiratory, chest, or ear infections.  · Any history of:  ? Heart or lung conditions, such as heart failure, sleep apnea, asthma, or chronic obstructive pulmonary disease (COPD).  ?  service.  ? Depression or anxiety.  · Any tobacco or drug use, including marijuana or alcohol use.  · Whether you are pregnant or may be pregnant.  What are the risks?  Generally, this is a safe procedure. However, " problems may occur, including:  · Allergic reaction.  · Lung and heart problems.  · Inhaling food or liquid from the stomach into the lungs (aspiration).  · Nerve injury.  · Air in the bloodstream, which can lead to stroke.  · Extreme agitation or confusion (delirium) when you wake up from the anesthetic.  · Waking up during your procedure and being unable to move. This is rare.  These problems are more likely to develop if you are having a major surgery or if you have an advanced or serious medical condition. You can prevent some of these complications by answering all of your health care provider's questions thoroughly and by following all instructions before your procedure.  General anesthesia can cause side effects, including:  · Nausea or vomiting.  · A sore throat from the breathing tube.  · Hoarseness.  · Wheezing or coughing.  · Shaking chills.  · Tiredness.  · Body aches.  · Anxiety.  · Sleepiness or drowsiness.  · Confusion or agitation.  RISKS AND COMPLICATIONS OF SURGERY  Your health care provider will discuss possible risks and complications with you before surgery. Common risks and complications include:    · Problems due to the use of anesthetics.  · Blood loss and replacement (does not apply to minor surgical procedures).  · Temporary increase in pain due to surgery.  · Uncorrected pain or problems that the surgery was meant to correct.  · Infection.  · New damage.    What happens before the procedure?    Medicines  Ask your health care provider about:  · Changing or stopping your regular medicines. This is especially important if you are taking diabetes medicines or blood thinners.  · Taking medicines such as aspirin and ibuprofen. These medicines can thin your blood. Do not take these medicines unless your health care provider tells you to take them.  · Taking over-the-counter medicines, vitamins, herbs, and supplements. Do not take these during the week before your procedure unless your health  care provider approves them.  General instructions  · Starting 3-6 weeks before the procedure, do not use any products that contain nicotine or tobacco, such as cigarettes and e-cigarettes. If you need help quitting, ask your health care provider.  · If you brush your teeth on the morning of the procedure, make sure to spit out all of the toothpaste.  · Tell your health care provider if you become ill or develop a cold, cough, or fever.  · If instructed by your health care provider, bring your sleep apnea device with you on the day of your surgery (if applicable).  · Ask your health care provider if you will be going home the same day, the following day, or after a longer hospital stay.  ? Plan to have someone take you home from the hospital or clinic.  ? Plan to have a responsible adult care for you for at least 24 hours after you leave the hospital or clinic. This is important.  What happens during the procedure?  · You will be given anesthetics through both of the following:  ? A mask placed over your nose and mouth.  ? An IV in one of your veins.  · You may receive a medicine to help you relax (sedative).  · After you are unconscious, a breathing tube may be inserted down your throat to help you breathe. This will be removed before you wake up.  · An anesthesia specialist will stay with you throughout your procedure. He or she will:  ? Keep you comfortable and safe by continuing to give you medicines and adjusting the amount of medicine that you get.  ? Monitor your blood pressure, pulse, and oxygen levels to make sure that the anesthetics do not cause any problems.  The procedure may vary among health care providers and hospitals.  What happens after the procedure?  · Your blood pressure, temperature, heart rate, breathing rate, and blood oxygen level will be monitored until the medicines you were given have worn off.  · You will wake up in a recovery area. You may wake up slowly.  · If you feel anxious or  agitated, you may be given medicine to help you calm down.  · If you will be going home the same day, your health care provider may check to make sure you can walk, drink, and urinate.  · Your health care provider will treat any pain or side effects you have before you go home.  · Do not drive for 24 hours if you were given a sedative.  Summary  · General anesthesia is used to keep you still and prevent pain during a procedure.  · It is important to tell your healthcare provider about your medical history and any surgeries you have had, and previous experience with anesthesia.  · Follow your healthcare provider’s instructions about when to stop eating, drinking, or taking certain medicines before your procedure.  · Plan to have someone take you home from the hospital or clinic.  This information is not intended to replace advice given to you by your health care provider. Make sure you discuss any questions you have with your health care provider.  Document Released: 03/26/2009 Document Revised: 08/03/2018 Document Reviewed: 08/03/2018  [employer] Interactive Patient Education © 2019 [employer]      Fall Prevention in Hospitals, Adult  As a hospital patient, your condition and the treatments you receive can increase your risk for falls. Some additional risk factors for falls in a hospital include:  · Being in an unfamiliar environment.  · Being on bed rest.  · Your surgery.  · Taking certain medicines.  · Your tubing requirements, such as intravenous (IV) therapy or catheters.  It is important that you learn how to decrease fall risks while at the hospital. Below are important tips that can help prevent falls.  SAFETY TIPS FOR PREVENTING FALLS  Talk about your risk of falling.  · Ask your health care provider why you are at risk for falling. Is it your medicine, illness, tubing placement, or something else?  · Make a plan with your health care provider to keep you safe from falls.  · Ask your health care provider or  pharmacist about side effects of your medicines. Some medicines can make you dizzy or affect your coordination.  Ask for help.  · Ask for help before getting out of bed. You may need to press your call button.  · Ask for assistance in getting safely to the toilet.  · Ask for a walker or cane to be put at your bedside. Ask that most of the side rails on your bed be placed up before your health care provider leaves the room.  · Ask family or friends to sit with you.  · Ask for things that are out of your reach, such as your glasses, hearing aids, telephone, bedside table, or call button.  Follow these tips to avoid falling:  · Stay lying or seated, rather than standing, while waiting for help.  · Wear rubber-soled slippers or shoes whenever you walk in the hospital.  · Avoid quick, sudden movements.  ¨ Change positions slowly.  ¨ Sit on the side of your bed before standing.  ¨ Stand up slowly and wait before you start to walk.  · Let your health care provider know if there is a spill on the floor.  · Pay careful attention to the medical equipment, electrical cords, and tubes around you.  · When you need help, use your call button by your bed or in the bathroom. Wait for one of your health care providers to help you.  · If you feel dizzy or unsure of your footing, return to bed and wait for assistance.  · Avoid being distracted by the TV, telephone, or another person in your room.  · Do not lean or support yourself on rolling objects, such as IV poles or bedside tables.     This information is not intended to replace advice given to you by your health care provider. Make sure you discuss any questions you have with your health care provider.     Document Released: 12/15/2001 Document Revised: 01/08/2016 Document Reviewed: 08/25/2013  [employer] Interactive Patient Education ©2016 [employer]

## 2020-10-06 ENCOUNTER — LAB (OUTPATIENT)
Dept: LAB | Facility: HOSPITAL | Age: 45
End: 2020-10-06

## 2020-10-06 PROCEDURE — C9803 HOPD COVID-19 SPEC COLLECT: HCPCS | Performed by: OTOLARYNGOLOGY

## 2020-10-06 PROCEDURE — U0003 INFECTIOUS AGENT DETECTION BY NUCLEIC ACID (DNA OR RNA); SEVERE ACUTE RESPIRATORY SYNDROME CORONAVIRUS 2 (SARS-COV-2) (CORONAVIRUS DISEASE [COVID-19]), AMPLIFIED PROBE TECHNIQUE, MAKING USE OF HIGH THROUGHPUT TECHNOLOGIES AS DESCRIBED BY CMS-2020-01-R: HCPCS | Performed by: OTOLARYNGOLOGY

## 2020-10-07 LAB
COVID LABCORP PRIORITY: NORMAL
SARS-COV-2 RNA RESP QL NAA+PROBE: NOT DETECTED

## 2020-10-09 ENCOUNTER — ANESTHESIA EVENT (OUTPATIENT)
Dept: PERIOP | Facility: HOSPITAL | Age: 45
End: 2020-10-09

## 2020-10-09 ENCOUNTER — HOSPITAL ENCOUNTER (OUTPATIENT)
Facility: HOSPITAL | Age: 45
Setting detail: HOSPITAL OUTPATIENT SURGERY
Discharge: HOME | End: 2020-10-09
Attending: OTOLARYNGOLOGY | Admitting: OTOLARYNGOLOGY

## 2020-10-09 ENCOUNTER — ANESTHESIA (OUTPATIENT)
Dept: PERIOP | Facility: HOSPITAL | Age: 45
End: 2020-10-09

## 2020-10-09 VITALS
TEMPERATURE: 98.4 F | DIASTOLIC BLOOD PRESSURE: 63 MMHG | OXYGEN SATURATION: 93 % | HEART RATE: 70 BPM | SYSTOLIC BLOOD PRESSURE: 111 MMHG | RESPIRATION RATE: 16 BRPM

## 2020-10-09 DIAGNOSIS — J01.81 OTHER ACUTE RECURRENT SINUSITIS: ICD-10-CM

## 2020-10-09 DIAGNOSIS — J32.0 CHRONIC MAXILLARY SINUSITIS: ICD-10-CM

## 2020-10-09 DIAGNOSIS — J34.3 HYPERTROPHY OF BOTH INFERIOR NASAL TURBINATES: ICD-10-CM

## 2020-10-09 DIAGNOSIS — J32.3 CHRONIC SPHENOIDAL SINUSITIS: ICD-10-CM

## 2020-10-09 DIAGNOSIS — J30.9 ALLERGIC RHINITIS, UNSPECIFIED SEASONALITY, UNSPECIFIED TRIGGER: ICD-10-CM

## 2020-10-09 PROCEDURE — 87102 FUNGUS ISOLATION CULTURE: CPT | Performed by: OTOLARYNGOLOGY

## 2020-10-09 PROCEDURE — 25010000002 SUCCINYLCHOLINE PER 20 MG: Performed by: NURSE ANESTHETIST, CERTIFIED REGISTERED

## 2020-10-09 PROCEDURE — C2625 STENT, NON-COR, TEM W/DEL SY: HCPCS | Performed by: OTOLARYNGOLOGY

## 2020-10-09 PROCEDURE — 25010000002 PROPOFOL 10 MG/ML EMULSION: Performed by: NURSE ANESTHETIST, CERTIFIED REGISTERED

## 2020-10-09 PROCEDURE — 88311 DECALCIFY TISSUE: CPT | Performed by: OTOLARYNGOLOGY

## 2020-10-09 PROCEDURE — 25010000002 ONDANSETRON PER 1 MG: Performed by: NURSE ANESTHETIST, CERTIFIED REGISTERED

## 2020-10-09 PROCEDURE — 87075 CULTR BACTERIA EXCEPT BLOOD: CPT | Performed by: OTOLARYNGOLOGY

## 2020-10-09 PROCEDURE — 31254 NSL/SINS NDSC W/PRTL ETHMDCT: CPT | Performed by: OTOLARYNGOLOGY

## 2020-10-09 PROCEDURE — 25010000002 DEXAMETHASONE PER 1 MG: Performed by: NURSE ANESTHETIST, CERTIFIED REGISTERED

## 2020-10-09 PROCEDURE — C9046 COCAINE HCL NASAL SOLUTION: HCPCS | Performed by: OTOLARYNGOLOGY

## 2020-10-09 PROCEDURE — 25010000002 PHENYLEPHRINE HCL 0.8 MG/10ML SOLUTION PREFILLED SYRINGE: Performed by: NURSE ANESTHETIST, CERTIFIED REGISTERED

## 2020-10-09 PROCEDURE — 25010000002 FENTANYL CITRATE (PF) 250 MCG/5ML SOLUTION: Performed by: NURSE ANESTHETIST, CERTIFIED REGISTERED

## 2020-10-09 PROCEDURE — C1769 GUIDE WIRE: HCPCS | Performed by: OTOLARYNGOLOGY

## 2020-10-09 PROCEDURE — 87185 SC STD ENZYME DETCJ PER NZM: CPT | Performed by: OTOLARYNGOLOGY

## 2020-10-09 PROCEDURE — 25010000002 MIDAZOLAM PER 1 MG: Performed by: ANESTHESIOLOGY

## 2020-10-09 PROCEDURE — 25010000002 METOCLOPRAMIDE PER 10 MG: Performed by: ANESTHESIOLOGY

## 2020-10-09 PROCEDURE — 25010000002 DEXAMETHASONE PER 1 MG: Performed by: ANESTHESIOLOGY

## 2020-10-09 PROCEDURE — 87077 CULTURE AEROBIC IDENTIFY: CPT | Performed by: OTOLARYNGOLOGY

## 2020-10-09 PROCEDURE — 25010000002 COCAINE HCL 40 MG/ML SOLUTION: Performed by: OTOLARYNGOLOGY

## 2020-10-09 PROCEDURE — 31256 EXPLORATION MAXILLARY SINUS: CPT | Performed by: OTOLARYNGOLOGY

## 2020-10-09 PROCEDURE — 87205 SMEAR GRAM STAIN: CPT | Performed by: OTOLARYNGOLOGY

## 2020-10-09 PROCEDURE — 30802 ABLATE INF TURBINATE SUBMUC: CPT | Performed by: OTOLARYNGOLOGY

## 2020-10-09 PROCEDURE — 88305 TISSUE EXAM BY PATHOLOGIST: CPT | Performed by: OTOLARYNGOLOGY

## 2020-10-09 PROCEDURE — 87070 CULTURE OTHR SPECIMN AEROBIC: CPT | Performed by: OTOLARYNGOLOGY

## 2020-10-09 DEVICE — PROPEL SINUS IMPLANT
Type: IMPLANTABLE DEVICE | Status: FUNCTIONAL
Brand: PROPEL

## 2020-10-09 DEVICE — SEAL HEMO SURG ARISTA/AH ABS/PWDR 3GM: Type: IMPLANTABLE DEVICE | Status: FUNCTIONAL

## 2020-10-09 RX ORDER — MIDAZOLAM HYDROCHLORIDE 1 MG/ML
1 INJECTION INTRAMUSCULAR; INTRAVENOUS
Status: DISCONTINUED | OUTPATIENT
Start: 2020-10-09 | End: 2020-10-09 | Stop reason: HOSPADM

## 2020-10-09 RX ORDER — LIDOCAINE HYDROCHLORIDE 40 MG/ML
SOLUTION TOPICAL AS NEEDED
Status: DISCONTINUED | OUTPATIENT
Start: 2020-10-09 | End: 2020-10-09 | Stop reason: SURG

## 2020-10-09 RX ORDER — OXYCODONE AND ACETAMINOPHEN 10; 325 MG/1; MG/1
1 TABLET ORAL ONCE AS NEEDED
Status: CANCELLED | OUTPATIENT
Start: 2020-10-09

## 2020-10-09 RX ORDER — ONDANSETRON 2 MG/ML
INJECTION INTRAMUSCULAR; INTRAVENOUS AS NEEDED
Status: DISCONTINUED | OUTPATIENT
Start: 2020-10-09 | End: 2020-10-09 | Stop reason: SURG

## 2020-10-09 RX ORDER — SODIUM CHLORIDE, SODIUM LACTATE, POTASSIUM CHLORIDE, CALCIUM CHLORIDE 600; 310; 30; 20 MG/100ML; MG/100ML; MG/100ML; MG/100ML
100 INJECTION, SOLUTION INTRAVENOUS CONTINUOUS
Status: DISCONTINUED | OUTPATIENT
Start: 2020-10-09 | End: 2020-10-09 | Stop reason: HOSPADM

## 2020-10-09 RX ORDER — HYDROCODONE BITARTRATE AND ACETAMINOPHEN 5; 325 MG/1; MG/1
1-2 TABLET ORAL EVERY 4 HOURS PRN
Qty: 8 TABLET | Refills: 0 | Status: SHIPPED | OUTPATIENT
Start: 2020-10-09 | End: 2020-10-17

## 2020-10-09 RX ORDER — METOCLOPRAMIDE HYDROCHLORIDE 5 MG/ML
10 INJECTION INTRAMUSCULAR; INTRAVENOUS ONCE AS NEEDED
Status: COMPLETED | OUTPATIENT
Start: 2020-10-09 | End: 2020-10-09

## 2020-10-09 RX ORDER — FENTANYL CITRATE 50 UG/ML
INJECTION, SOLUTION INTRAMUSCULAR; INTRAVENOUS AS NEEDED
Status: DISCONTINUED | OUTPATIENT
Start: 2020-10-09 | End: 2020-10-09 | Stop reason: SURG

## 2020-10-09 RX ORDER — FENTANYL CITRATE 50 UG/ML
25 INJECTION, SOLUTION INTRAMUSCULAR; INTRAVENOUS
Status: CANCELLED | OUTPATIENT
Start: 2020-10-09

## 2020-10-09 RX ORDER — SODIUM CHLORIDE 0.9 % (FLUSH) 0.9 %
3 SYRINGE (ML) INJECTION AS NEEDED
Status: DISCONTINUED | OUTPATIENT
Start: 2020-10-09 | End: 2020-10-09 | Stop reason: HOSPADM

## 2020-10-09 RX ORDER — LIDOCAINE HYDROCHLORIDE 10 MG/ML
0.5 INJECTION, SOLUTION EPIDURAL; INFILTRATION; INTRACAUDAL; PERINEURAL ONCE AS NEEDED
Status: DISCONTINUED | OUTPATIENT
Start: 2020-10-09 | End: 2020-10-09 | Stop reason: HOSPADM

## 2020-10-09 RX ORDER — SUCCINYLCHOLINE CHLORIDE 20 MG/ML
INJECTION INTRAMUSCULAR; INTRAVENOUS AS NEEDED
Status: DISCONTINUED | OUTPATIENT
Start: 2020-10-09 | End: 2020-10-09 | Stop reason: SURG

## 2020-10-09 RX ORDER — SODIUM CHLORIDE, SODIUM LACTATE, POTASSIUM CHLORIDE, CALCIUM CHLORIDE 600; 310; 30; 20 MG/100ML; MG/100ML; MG/100ML; MG/100ML
1000 INJECTION, SOLUTION INTRAVENOUS CONTINUOUS
Status: DISCONTINUED | OUTPATIENT
Start: 2020-10-09 | End: 2020-10-09 | Stop reason: HOSPADM

## 2020-10-09 RX ORDER — EPHEDRINE SULFATE 50 MG/ML
INJECTION, SOLUTION INTRAVENOUS AS NEEDED
Status: DISCONTINUED | OUTPATIENT
Start: 2020-10-09 | End: 2020-10-09 | Stop reason: SURG

## 2020-10-09 RX ORDER — COCAINE HYDROCHLORIDE 40 MG/ML
SOLUTION NASAL
Status: DISCONTINUED
Start: 2020-10-09 | End: 2020-10-09 | Stop reason: HOSPADM

## 2020-10-09 RX ORDER — DEXAMETHASONE SODIUM PHOSPHATE 4 MG/ML
INJECTION, SOLUTION INTRA-ARTICULAR; INTRALESIONAL; INTRAMUSCULAR; INTRAVENOUS; SOFT TISSUE AS NEEDED
Status: DISCONTINUED | OUTPATIENT
Start: 2020-10-09 | End: 2020-10-09 | Stop reason: SURG

## 2020-10-09 RX ORDER — DEXAMETHASONE SODIUM PHOSPHATE 4 MG/ML
4 INJECTION, SOLUTION INTRA-ARTICULAR; INTRALESIONAL; INTRAMUSCULAR; INTRAVENOUS; SOFT TISSUE ONCE AS NEEDED
Status: COMPLETED | OUTPATIENT
Start: 2020-10-09 | End: 2020-10-09

## 2020-10-09 RX ORDER — NALOXONE HCL 0.4 MG/ML
0.4 VIAL (ML) INJECTION AS NEEDED
Status: CANCELLED | OUTPATIENT
Start: 2020-10-09

## 2020-10-09 RX ORDER — SCOLOPAMINE TRANSDERMAL SYSTEM 1 MG/1
1 PATCH, EXTENDED RELEASE TRANSDERMAL CONTINUOUS
Status: DISCONTINUED | OUTPATIENT
Start: 2020-10-09 | End: 2020-10-09 | Stop reason: HOSPADM

## 2020-10-09 RX ORDER — MAGNESIUM HYDROXIDE 1200 MG/15ML
LIQUID ORAL AS NEEDED
Status: DISCONTINUED | OUTPATIENT
Start: 2020-10-09 | End: 2020-10-09 | Stop reason: HOSPADM

## 2020-10-09 RX ORDER — SODIUM CHLORIDE 0.9 % (FLUSH) 0.9 %
3-10 SYRINGE (ML) INJECTION AS NEEDED
Status: DISCONTINUED | OUTPATIENT
Start: 2020-10-09 | End: 2020-10-09 | Stop reason: HOSPADM

## 2020-10-09 RX ORDER — ROCURONIUM BROMIDE 10 MG/ML
INJECTION, SOLUTION INTRAVENOUS AS NEEDED
Status: DISCONTINUED | OUTPATIENT
Start: 2020-10-09 | End: 2020-10-09 | Stop reason: SURG

## 2020-10-09 RX ORDER — LABETALOL HYDROCHLORIDE 5 MG/ML
5 INJECTION, SOLUTION INTRAVENOUS
Status: CANCELLED | OUTPATIENT
Start: 2020-10-09

## 2020-10-09 RX ORDER — ACETAMINOPHEN 500 MG
1000 TABLET ORAL ONCE
Status: COMPLETED | OUTPATIENT
Start: 2020-10-09 | End: 2020-10-09

## 2020-10-09 RX ORDER — FLUMAZENIL 0.1 MG/ML
0.2 INJECTION INTRAVENOUS AS NEEDED
Status: CANCELLED | OUTPATIENT
Start: 2020-10-09

## 2020-10-09 RX ORDER — WATER 1000 ML/1000ML
INJECTION, SOLUTION INTRAVENOUS AS NEEDED
Status: DISCONTINUED | OUTPATIENT
Start: 2020-10-09 | End: 2020-10-09 | Stop reason: HOSPADM

## 2020-10-09 RX ORDER — CLINDAMYCIN HYDROCHLORIDE 300 MG/1
300 CAPSULE ORAL 3 TIMES DAILY
Qty: 21 CAPSULE | Refills: 0 | Status: SHIPPED | OUTPATIENT
Start: 2020-10-09 | End: 2020-10-16

## 2020-10-09 RX ORDER — OXYCODONE AND ACETAMINOPHEN 7.5; 325 MG/1; MG/1
2 TABLET ORAL EVERY 4 HOURS PRN
Status: CANCELLED | OUTPATIENT
Start: 2020-10-09 | End: 2020-10-19

## 2020-10-09 RX ORDER — IBUPROFEN 600 MG/1
600 TABLET ORAL ONCE AS NEEDED
Status: CANCELLED | OUTPATIENT
Start: 2020-10-09

## 2020-10-09 RX ORDER — LIDOCAINE HYDROCHLORIDE AND EPINEPHRINE 10; 10 MG/ML; UG/ML
INJECTION, SOLUTION INFILTRATION; PERINEURAL AS NEEDED
Status: DISCONTINUED | OUTPATIENT
Start: 2020-10-09 | End: 2020-10-09 | Stop reason: HOSPADM

## 2020-10-09 RX ORDER — HYDROCODONE BITARTRATE AND ACETAMINOPHEN 5; 325 MG/1; MG/1
1 TABLET ORAL ONCE AS NEEDED
Status: DISCONTINUED | OUTPATIENT
Start: 2020-10-09 | End: 2020-10-09 | Stop reason: HOSPADM

## 2020-10-09 RX ORDER — SODIUM CHLORIDE, SODIUM LACTATE, POTASSIUM CHLORIDE, CALCIUM CHLORIDE 600; 310; 30; 20 MG/100ML; MG/100ML; MG/100ML; MG/100ML
INJECTION, SOLUTION INTRAVENOUS CONTINUOUS PRN
Status: COMPLETED | OUTPATIENT
Start: 2020-10-09 | End: 2020-10-09

## 2020-10-09 RX ORDER — PHENYLEPHRINE HCL IN 0.9% NACL 0.8MG/10ML
SYRINGE (ML) INTRAVENOUS AS NEEDED
Status: DISCONTINUED | OUTPATIENT
Start: 2020-10-09 | End: 2020-10-09 | Stop reason: SURG

## 2020-10-09 RX ORDER — OXYMETAZOLINE HYDROCHLORIDE 0.05 G/100ML
2 SPRAY NASAL
Status: DISCONTINUED | OUTPATIENT
Start: 2020-10-09 | End: 2020-10-09 | Stop reason: HOSPADM

## 2020-10-09 RX ORDER — COCAINE HYDROCHLORIDE 40 MG/ML
SOLUTION NASAL AS NEEDED
Status: DISCONTINUED | OUTPATIENT
Start: 2020-10-09 | End: 2020-10-09 | Stop reason: HOSPADM

## 2020-10-09 RX ORDER — SODIUM CHLORIDE 0.9 % (FLUSH) 0.9 %
3 SYRINGE (ML) INJECTION EVERY 12 HOURS SCHEDULED
Status: DISCONTINUED | OUTPATIENT
Start: 2020-10-09 | End: 2020-10-09 | Stop reason: HOSPADM

## 2020-10-09 RX ORDER — FAMOTIDINE 10 MG/ML
20 INJECTION, SOLUTION INTRAVENOUS
Status: COMPLETED | OUTPATIENT
Start: 2020-10-09 | End: 2020-10-09

## 2020-10-09 RX ORDER — PROPOFOL 10 MG/ML
VIAL (ML) INTRAVENOUS AS NEEDED
Status: DISCONTINUED | OUTPATIENT
Start: 2020-10-09 | End: 2020-10-09 | Stop reason: SURG

## 2020-10-09 RX ORDER — FENTANYL CITRATE 50 UG/ML
INJECTION, SOLUTION INTRAMUSCULAR; INTRAVENOUS AS NEEDED
Status: DISCONTINUED | OUTPATIENT
Start: 2020-10-09 | End: 2020-10-09

## 2020-10-09 RX ORDER — ONDANSETRON 2 MG/ML
4 INJECTION INTRAMUSCULAR; INTRAVENOUS ONCE AS NEEDED
Status: CANCELLED | OUTPATIENT
Start: 2020-10-09

## 2020-10-09 RX ADMIN — Medication 80 MCG: at 12:49

## 2020-10-09 RX ADMIN — METOCLOPRAMIDE 10 MG: 5 INJECTION, SOLUTION INTRAMUSCULAR; INTRAVENOUS at 10:34

## 2020-10-09 RX ADMIN — ONDANSETRON HYDROCHLORIDE 4 MG: 2 SOLUTION INTRAMUSCULAR; INTRAVENOUS at 13:09

## 2020-10-09 RX ADMIN — DEXAMETHASONE SODIUM PHOSPHATE 4 MG: 4 INJECTION, SOLUTION INTRAMUSCULAR; INTRAVENOUS at 13:09

## 2020-10-09 RX ADMIN — FENTANYL CITRATE 50 MCG: 50 INJECTION INTRAMUSCULAR; INTRAVENOUS at 13:07

## 2020-10-09 RX ADMIN — Medication 160 MCG: at 12:20

## 2020-10-09 RX ADMIN — MIDAZOLAM HYDROCHLORIDE 1 MG: 2 INJECTION, SOLUTION INTRAMUSCULAR; INTRAVENOUS at 10:34

## 2020-10-09 RX ADMIN — EPHEDRINE SULFATE 10 MG: 50 INJECTION INTRAVENOUS at 13:08

## 2020-10-09 RX ADMIN — ROCURONIUM BROMIDE 5 MG: 10 INJECTION INTRAVENOUS at 12:12

## 2020-10-09 RX ADMIN — Medication 80 MCG: at 13:05

## 2020-10-09 RX ADMIN — Medication 2 SPRAY: at 10:34

## 2020-10-09 RX ADMIN — PROPOFOL 100 MG: 10 INJECTION, EMULSION INTRAVENOUS at 12:13

## 2020-10-09 RX ADMIN — FENTANYL CITRATE 100 MCG: 50 INJECTION INTRAMUSCULAR; INTRAVENOUS at 12:08

## 2020-10-09 RX ADMIN — DEXAMETHASONE SODIUM PHOSPHATE 4 MG: 4 INJECTION, SOLUTION INTRAMUSCULAR; INTRAVENOUS at 10:34

## 2020-10-09 RX ADMIN — FAMOTIDINE 20 MG: 10 INJECTION INTRAVENOUS at 10:34

## 2020-10-09 RX ADMIN — EPHEDRINE SULFATE 15 MG: 50 INJECTION INTRAVENOUS at 12:25

## 2020-10-09 RX ADMIN — EPHEDRINE SULFATE 10 MG: 50 INJECTION INTRAVENOUS at 13:07

## 2020-10-09 RX ADMIN — FENTANYL CITRATE 100 MCG: 50 INJECTION INTRAMUSCULAR; INTRAVENOUS at 13:00

## 2020-10-09 RX ADMIN — ACETAMINOPHEN 1000 MG: 500 TABLET, FILM COATED ORAL at 10:34

## 2020-10-09 RX ADMIN — Medication 80 MCG: at 12:28

## 2020-10-09 RX ADMIN — SCOPALAMINE 1 PATCH: 1 PATCH, EXTENDED RELEASE TRANSDERMAL at 10:33

## 2020-10-09 RX ADMIN — LIDOCAINE HYDROCHLORIDE 100 MG: 20 INJECTION, SOLUTION INTRAVENOUS at 12:13

## 2020-10-09 RX ADMIN — LIDOCAINE HYDROCHLORIDE 1 EACH: 40 SOLUTION TOPICAL at 12:16

## 2020-10-09 RX ADMIN — SUCCINYLCHOLINE CHLORIDE 100 MG: 20 INJECTION, SOLUTION INTRAMUSCULAR; INTRAVENOUS at 12:13

## 2020-10-09 RX ADMIN — SODIUM CHLORIDE, POTASSIUM CHLORIDE, SODIUM LACTATE AND CALCIUM CHLORIDE 1000 ML: 600; 310; 30; 20 INJECTION, SOLUTION INTRAVENOUS at 09:25

## 2020-10-09 RX ADMIN — Medication 80 MCG: at 13:01

## 2020-10-09 NOTE — ANESTHESIA PROCEDURE NOTES
Airway  Urgency: elective    Date/Time: 10/9/2020 12:16 PM  Airway not difficult    General Information and Staff    Patient location during procedure: OR  CRNA: Hector Bates CRNA    Indications and Patient Condition  Indications for airway management: airway protection    Preoxygenated: yes  MILS maintained throughout  Mask difficulty assessment: 1 - vent by mask    Final Airway Details  Final airway type: endotracheal airway      Successful airway: ETT  Cuffed: yes   Successful intubation technique: direct laryngoscopy  Endotracheal tube insertion site: oral  Blade: Perez  Blade size: 2  ETT size (mm): 7.0  Cormack-Lehane Classification: grade IIa - partial view of glottis  Placement verified by: chest auscultation and capnometry   Cuff volume (mL): 5  Measured from: gums  ETT/EBT to gums (cm): 21  Number of attempts at approach: 1  Assessment: lips, teeth, and gum same as pre-op and atraumatic intubation

## 2020-10-09 NOTE — OP NOTE
OPERATIVE NOTE  10/9/2020    NAME: Sandy Estes    YOB: 1975  MRN: [id number]    PRE-OPERATIVE DIAGNOSIS:    Hypertrophy of both inferior nasal turbinates [J34.3]  Chronic maxillary sinusitis [J32.0]  Allergic rhinitis, unspecified seasonality, unspecified trigger [J30.9]  Other acute recurrent sinusitis [J01.81]  Chronic sphenoidal sinusitis [J32.3]    POST-OPERATIVE DIAGNOSIS:   Post-Op Diagnosis Codes:     * Hypertrophy of both inferior nasal turbinates [J34.3]     * Chronic maxillary sinusitis [J32.0]     * Allergic rhinitis, unspecified seasonality, unspecified trigger [J30.9]     * Other acute recurrent sinusitis [J01.81]     * Chronic sphenoidal sinusitis [J32.3]    PROCEDURE PERFORMED:   Functional endoscopic anterior ethmoidectomy, bilateral middle turbinectomy with bilateral middle meatal antrostomy  Bilateral inferior turbinate reduction via Coblation    SURGEON:   Bairon Ramirez MD    ASSISTANT(S):   None    ANESTHESIA:   General Anesthesia via Endotracheal Tube    INDICATIONS: The patient is a 45 y.o. female with Hypertrophy of both inferior nasal turbinates [J34.3]  Chronic maxillary sinusitis [J32.0]  Allergic rhinitis, unspecified seasonality, unspecified trigger [J30.9]  Other acute recurrent sinusitis [J01.81]  Chronic sphenoidal sinusitis [J32.3]    PROCEDURE:  The patient was brought to the operating room, given General Anesthesia via Endotracheal Tube, and prepped and draped in the usual manner.     Approximately 4 mL of 4% cocaine solution impregnating Codman neurosurgical pledgets were placed intranasally and 5 minutes allowed to pass by the clock.  The pledgets were removed.    Rigid nasal endoscopy was subsequently performed with the Stortz 0° endoscope.  On the left the middle turbinate was subluxed medially utilizing a Millstone elevator and injection accomplished with 1 mL 1% Xylocaine with epinephrine.  The turbinate was transected from its lateral nasal wall attachments  utilizing Bellucci scissors and further removed with Chris forceps and the ground lamella attachments resected utilizing Neo-Cut forceps.  No significant bleeding was encountered at the ground lamella and the little bleeding which was encountered was controlled with the Weck suction cautery.    An anterior ethmoidectomy was performed with the RAD-12microdebrider.  The uncinate process remnant was removed after the natural opening of the maxillary sinus was identified with the blunt ostium seeker.  The ethmoid bulla was infractured and unroofed utilizing the microdebrider.  No intranasal polyposis was appreciated.  The frontal recess appeared patent and so no dissection was performed in this area.  The dissection progressed posteriorly to the anterior portion of the posterior ethmoids.  No true polypoid changes were noted throughout the dissection.  Edematous mucosa was noted with gross purulent secretions but no antral polyposis was appreciated.  Cultures were taken and submitted for the left antrum.    Inferior turbinate reduction was accomplished with the Coblator through a single penetration via the anterior portion of the inferior turbinate  intramurally or deep into the mucosa and 3 lesions were subsequently created with the Coblator as the Coblator wand was gently withdrawn following full insertion.  No significant bleeding was encountered.    Librado was placed over the free margin of the middle turbinate and ground lamella.  A Regular Propel implant was placed near the antrostomy.   Stammberger sinus foam dressing was also placed.  No significant bleeding was noted.   Attention was then turned to the opposite side where a similar procedure was performed with similar findings.    The patient was transported upon extubation to the postanesthesia care unit in stable condition.    SPECIMENS:  A: Ethmoid body mucosa  B: Left antral culture  C: Right antral culture    COMPLICATIONS: NONE    ESTIMATED BLOOD  LOSS:  < 25 cc    Bairon Ramirez MD  10/9/2020

## 2020-10-09 NOTE — DISCHARGE INSTRUCTIONS

## 2020-10-09 NOTE — ANESTHESIA POSTPROCEDURE EVALUATION
Patient: Sandy Estes    Procedure Summary     Date: 10/09/20 Room / Location:  [facility] OR  /  [facility]    Anesthesia Start: 1208 Anesthesia Stop: 1335    Procedure: ENDOSCOPIC FUNCTIONAL SINUS SURGERY W TRACKING, BILATERAL MAXILLARY AND LEFT SPHENOID SINUPLASTY,  RESECT INFERIOR TURBINATES VIA COBLATION (Bilateral Nose) Diagnosis:       Hypertrophy of both inferior nasal turbinates      Chronic maxillary sinusitis      Allergic rhinitis, unspecified seasonality, unspecified trigger      Other acute recurrent sinusitis      Chronic sphenoidal sinusitis      (Hypertrophy of both inferior nasal turbinates [J34.3])      (Chronic maxillary sinusitis [J32.0])      (Allergic rhinitis, unspecified seasonality, unspecified trigger [J30.9])      (Other acute recurrent sinusitis [J01.81])      (Chronic sphenoidal sinusitis [J32.3])    Surgeon: Bairon Ramirez MD Provider: Hector Bates CRNA    Anesthesia Type: general ASA Status: 3          Anesthesia Type: general    Vitals  Vitals Value Taken Time   /72 10/09/20 1355   Temp 98.4 °F (36.9 °C) 10/09/20 1355   Pulse 74 10/09/20 1358   Resp 18 10/09/20 1355   SpO2 92 % 10/09/20 1358   Vitals shown include unvalidated device data.        Post Anesthesia Care and Evaluation    Patient location during evaluation: PACU  Patient participation: complete - patient participated  Level of consciousness: awake and alert  Pain management: adequate  Airway patency: patent  Anesthetic complications: No anesthetic complications  PONV Status: none  Cardiovascular status: acceptable and hemodynamically stable  Respiratory status: acceptable  Hydration status: acceptable    Comments: Blood pressure 111/63, pulse 70, temperature 98.4 °F (36.9 °C), temperature source Temporal, resp. rate 16, SpO2 93 %, not currently breastfeeding.    Patient discharged from PACU based upon Santos score. Please see RN notes for further details

## 2020-10-09 NOTE — ANESTHESIA PREPROCEDURE EVALUATION
" Anesthesia Evaluation     Patient summary reviewed and Nursing notes reviewed   no history of anesthetic complications:  NPO Solid Status: > 8 hours  NPO Liquid Status: > 8 hours           Airway   Mallampati: III  TM distance: >3 FB  Neck ROM: full  Possible difficult intubation  Dental    (+) poor dentition    Pulmonary    (+) asthma (with URI, no recent issues),  Cardiovascular   Exercise tolerance: poor (<4 METS)    ECG reviewed  Patient on routine beta blocker and Beta blocker given within 24 hours of surgery    (+) hypertension, valvular problems/murmurs, hyperlipidemia,     ROS comment: pumonary valve replacement age 8, \"muscle shaved down\" ? Septal surgery?    Cardiac MRI 2017    1. 42-year-old female with history of congenital pulmonic stenosis, post repair.  2. No significant residual pulmonic stenosis identified. However, there is free pulmonary regurgitation estimated at 54%.   3. Very mild enlargement of the right ventricle which has developed very mild hypertrophy as well. RV EDVI of 102.4 mL/sq m. Right ventricular function is maintained.  4. Enlargement of the main pulmonary artery and branch pulmonary arteries.  5. 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      "

## 2020-10-10 ENCOUNTER — NURSE TRIAGE (OUTPATIENT)
Dept: CALL CENTER | Facility: HOSPITAL | Age: 45
End: 2020-10-10

## 2020-10-10 NOTE — TELEPHONE ENCOUNTER
"    Reason for Disposition  • Health Information question, no triage required and triager able to answer question    Additional Information  • Negative: [1] Caller is not with the adult (patient) AND [2] reporting urgent symptoms  • Negative: Lab result questions  • Negative: Medication questions  • Negative: Caller can't be reached by phone  • Negative: Caller has already spoken to PCP or another triager  • Negative: RN needs further essential information from caller in order to complete triage  • Negative: Requesting regular office appointment  • Negative: [1] Caller requesting NON-URGENT health information AND [2] PCP's office is the best resource    Answer Assessment - Initial Assessment Questions  1. REASON FOR CALL or QUESTION: \"What is your reason for calling today?\" or \"How can I best help you?\" or \"What question do you have that I can help answer?\"      Daughter had surgery yesterday and was prescribed an antibiotic that she is allergic to. Mother was instructed to call and speak to surgeon on call for further.    Protocols used: INFORMATION ONLY CALL - NO TRIAGE-ADULT-      "

## 2020-10-11 LAB
BACTERIA FLD CULT: ABNORMAL
GRAM STN SPEC: ABNORMAL

## 2020-10-12 DIAGNOSIS — J01.81 OTHER ACUTE RECURRENT SINUSITIS: ICD-10-CM

## 2020-10-12 RX ORDER — AZITHROMYCIN 250 MG/1
TABLET, FILM COATED ORAL
Qty: 6 TABLET | Refills: 0 | Status: SHIPPED | OUTPATIENT
Start: 2020-10-12 | End: 2020-11-06

## 2020-10-14 LAB
BACTERIA SPEC ANAEROBE CULT: NORMAL
BACTERIA SPEC ANAEROBE CULT: NORMAL

## 2020-10-21 NOTE — PROGRESS NOTES
YOB: 1975  Location: [facility]  Location Address: [address] [place] [address], [facility], [address] [place] [address]  Location Phone: [phone]    Chief Complaint   Patient presents with   • Follow-up       History of Present Illness  Sandy Estes is a 45 y.o. female.  Sandy Estes is status post Functional endoscopic anterior ethmoidectomy, bilateral middle turbinectomy with bilateral middle meatal antrostomy and Bilateral inferior turbinate reduction via Coblation on 10/9/20. The patient reports overall improved symptoms other than congestion. The patient as no other complaints at this time.     Past Medical History:   Diagnosis Date   • Anxiety    • Asthma    • Brain tumor, glioma (CMS/HCC)     Optic chiasm   • Depression    • GERD (gastroesophageal reflux disease)    • Headache    • Hyperlipidemia    • Hypertension    • Migraine    • Neurofibromatosis (CMS/HCC)     6 months old dx       Past Surgical History:   Procedure Laterality Date   • CARDIAC SURGERY     • ENDOSCOPIC FUNCTIONAL SINUS SURGERY (FESS) Bilateral 10/9/2020    Procedure: ENDOSCOPIC FUNCTIONAL SINUS SURGERY W TRACKING, BILATERAL MAXILLARY AND LEFT SPHENOID SINUPLASTY,  RESECT INFERIOR TURBINATES VIA COBLATION;  Surgeon: Bairon Ramirez MD;  Location: [facility];  Service: ENT;  Laterality: Bilateral;   • GALLBLADDER SURGERY     • HYSTERECTOMY     • OTHER SURGICAL HISTORY      repaired infundibular & valvular PS: res. trivial PS and Mild to Mod. PI (age 8)   • TONSILLECTOMY AND ADENOIDECTOMY         Outpatient Medications Marked as Taking for the 10/22/20 encounter (Office Visit) with Bairon Ramirez MD   Medication Sig Dispense Refill   • ALPRAZolam (XANAX) 1 MG tablet Take 1 mg by mouth 3 (Three) Times a Day As Needed for Anxiety.     • amLODIPine (NORVASC) 5 MG tablet TAKE ONE TABLET BY MOUTH EVERY DAY 30 tablet 5   • azithromycin (ZITHROMAX) 250 MG tablet Take 2 tablets the first day, then 1 tablet daily for 4 days. 6 tablet  0   • carvedilol (COREG) 6.25 MG tablet Take 6.25 mg by mouth 2 (Two) Times a Day With Meals.     • cetirizine (zyrTEC) 10 MG tablet Take 10 mg by mouth Daily.     • dicyclomine (BENTYL) 10 MG capsule Take 10 mg by mouth 4 (Four) Times a Day Before Meals & at Bedtime.     • DULoxetine (CYMBALTA) 60 MG capsule Take 90 mg by mouth Daily.     • escitalopram (LEXAPRO) 20 MG tablet Take 20 mg by mouth Daily.     • fenofibrate (TRICOR) 54 MG tablet Take 1 tablet by mouth Daily.     • FIBER COMPLETE PO Take 1 dose by mouth Daily As Needed (for constipation).     • galcanezumab-gnlm (EMGALITY) 120 MG/ML prefilled syringe Inject 1 mL under the skin into the appropriate area as directed Every 30 (Thirty) Days. 1.12 mL 5   • hydrOXYzine (ATARAX) 25 MG tablet Take 25 mg by mouth Every 4 (Four) Hours As Needed.     • losartan (COZAAR) 100 MG tablet Take 100 mg by mouth Daily.     • memantine (NAMENDA) 10 MG tablet TAKE ONE TABLET BY MOUTH TWICE A DAY 60 tablet 5   • montelukast (SINGULAIR) 10 MG tablet Take 10 mg by mouth Every Night.     • mupirocin (BACTROBAN) 2 % nasal ointment into the nostril(s) as directed by provider 2 (Two) Times a Day for 14 days. Apply to the nose twice daily 3 g 0   • mupirocin (BACTROBAN) 2 % ointment      • pantoprazole (PROTONIX) 40 MG EC tablet Take 40 mg by mouth Daily.     • rizatriptan MLT (MAXALT-MLT) 5 MG disintegrating tablet DISSOLVE 1 TABLET UNDER TONGUE FOR HEADACHE ...MAY REPEAT IN 2 HRS IF NEEDED..NO MORE THAN 2/24HRS! 9 tablet 0   • tiZANidine (ZANAFLEX) 2 MG tablet Take 2 mg by mouth Every Night.     • TROKENDI  MG capsule sustained-release 24 hr TAKE ONE CAPSULE BY MOUTH EVERY DAY 30 capsule 5   • ubrogepant (ubrogepant) 50 MG tablet Take 1 tablet by mouth As Needed (Migraine). May repeat in 2 hours if needed, max 2 tabs in 24 hours. 10 tablet 12   • zolpidem (AMBIEN) 10 MG tablet Take 10 mg by mouth At Night As Needed for Sleep.         Penicillin g, Penicillins, Viibryd  [vilazodone hcl], Amoxil [amoxicillin], Biaxin [clarithromycin], Cefzil [cefprozil], Clindamycin/lincomycin, Doxycycline, Lisinopril, Sulfa antibiotics, Sulfasalazine, Vancomycin, and Zofran [ondansetron hcl]    Family History   Problem Relation Age of Onset   • Breast cancer Mother    • Heart disease Father    • Hypertension Father        Social History     Socioeconomic History   • Marital status: Single     Spouse name: Not on file   • Number of children: Not on file   • Years of education: Not on file   • Highest education level: Not on file   Tobacco Use   • Smoking status: Former Smoker     Types: Cigarettes   • Smokeless tobacco: Never Used   • Tobacco comment: quit 7 years ago   Substance and Sexual Activity   • Alcohol use: No   • Drug use: No   • Sexual activity: Defer       Review of Systems   Constitutional: Negative for activity change, appetite change, chills, diaphoresis, fatigue, fever and unexpected weight change.   HENT: Positive for congestion. Negative for dental problem, drooling, ear discharge, ear pain, facial swelling, hearing loss, mouth sores, nosebleeds, postnasal drip, rhinorrhea, sinus pressure, sneezing, sore throat, tinnitus, trouble swallowing and voice change.         S/p sinus surgery   Eyes: Negative.    Respiratory: Negative.    Cardiovascular: Negative.    Gastrointestinal: Negative.    Endocrine: Negative.    Skin: Negative.    Allergic/Immunologic: Positive for environmental allergies. Negative for food allergies and immunocompromised state.   Neurological: Negative.    Hematological: Negative.    Psychiatric/Behavioral: Negative.        Vitals:    10/22/20 1640   BP: 138/79   Pulse: 89   Temp: 97.8 °F (36.6 °C)       Body mass index is 38.41 kg/m².    Objective     Physical Exam  Physical Exam  CONSTITUTIONAL: well nourished, alert, oriented, in no acute distress      COMMUNICATION AND VOICE: able to communicate normally, normal voice quality     HEAD: normocephalic, no  lesions, atraumatic, no tenderness, no masses      FACE: appearance normal, no lesions, no tenderness, no deformities, facial motion symmetric      EYES: ocular motility normal, eyelids normal, orbits normal, no proptosis, conjunctiva normal , pupils equal, round      EARS:  Hearing: response to conversational voice normal bilaterally   External Ears: auricles without lesions     NOSE:  External Nose: structure normal, no tenderness on palpation, no nasal discharge, no lesions, no evidence of trauma, nostrils patent   Intranasal Exam: see procedure note     ORAL:  Lips: upper and lower lips without lesion     NECK: neck appearance normal     CHEST/RESPIRATORY: respiratory effort normaL     CARDIOVASCULAR: extremities without cyanosis or edema       NEUROLOGIC/PSYCHIATRIC: oriented to time, place and person, mood normal, affect appropriate, CN II-XII intact grossly    Assessment/Plan   Diagnoses and all orders for this visit:    1. Allergic rhinitis, unspecified seasonality, unspecified trigger (Primary)  -     fluticasone (FLONASE) 50 MCG/ACT nasal spray; 2 sprays into the nostril(s) as directed by provider Daily.  Dispense: 16 g; Refill: 11  -     azelastine (ASTELIN) 0.1 % nasal spray; 2 sprays into the nostril(s) as directed by provider 2 (Two) Times a Day. Use in each nostril as directed  Dispense: 30 mL; Refill: 11    2. Neurofibromatosis (CMS/HCC)    3. S/P sinus surgery  -     fluticasone (FLONASE) 50 MCG/ACT nasal spray; 2 sprays into the nostril(s) as directed by provider Daily.  Dispense: 16 g; Refill: 11  -     azelastine (ASTELIN) 0.1 % nasal spray; 2 sprays into the nostril(s) as directed by provider 2 (Two) Times a Day. Use in each nostril as directed  Dispense: 30 mL; Refill: 11      * Surgery not found *  No orders of the defined types were placed in this encounter.    Return in about 6 weeks (around 12/3/2020) for Recheck sinus.       Patient Instructions   Resume nasal sprays and return to normal  activity, continue treatment as directed, if symptoms develop call for sooner appointment, otherwise follow-up as directed.    The patient understands and agrees with assessment and plan.

## 2020-10-22 ENCOUNTER — OFFICE VISIT (OUTPATIENT)
Dept: OTOLARYNGOLOGY | Facility: CLINIC | Age: 45
End: 2020-10-22

## 2020-10-22 VITALS
SYSTOLIC BLOOD PRESSURE: 138 MMHG | TEMPERATURE: 97.8 F | DIASTOLIC BLOOD PRESSURE: 79 MMHG | BODY MASS INDEX: 38.64 KG/M2 | HEART RATE: 89 BPM | HEIGHT: 62 IN | WEIGHT: 210 LBS

## 2020-10-22 DIAGNOSIS — Z98.890 S/P SINUS SURGERY: ICD-10-CM

## 2020-10-22 DIAGNOSIS — J30.9 ALLERGIC RHINITIS, UNSPECIFIED SEASONALITY, UNSPECIFIED TRIGGER: Primary | ICD-10-CM

## 2020-10-22 DIAGNOSIS — Q85.00 NEUROFIBROMATOSIS (HCC): ICD-10-CM

## 2020-10-22 PROCEDURE — 31237 NSL/SINS NDSC SURG BX POLYPC: CPT | Performed by: PHYSICIAN ASSISTANT

## 2020-10-22 PROCEDURE — 99024 POSTOP FOLLOW-UP VISIT: CPT | Performed by: PHYSICIAN ASSISTANT

## 2020-10-22 RX ORDER — AZELASTINE 1 MG/ML
2 SPRAY, METERED NASAL 2 TIMES DAILY
Qty: 30 ML | Refills: 11 | Status: SHIPPED | OUTPATIENT
Start: 2020-10-22

## 2020-10-22 RX ORDER — FLUTICASONE PROPIONATE 50 MCG
2 SPRAY, SUSPENSION (ML) NASAL DAILY
Qty: 16 G | Refills: 11 | Status: SHIPPED | OUTPATIENT
Start: 2020-10-22 | End: 2022-05-18

## 2020-10-23 PROBLEM — J34.3 HYPERTROPHY OF BOTH INFERIOR NASAL TURBINATES: Status: RESOLVED | Noted: 2020-08-06 | Resolved: 2020-10-23

## 2020-10-23 PROBLEM — J32.3 CHRONIC SPHENOIDAL SINUSITIS: Status: RESOLVED | Noted: 2020-09-17 | Resolved: 2020-10-23

## 2020-10-23 PROBLEM — Z98.890 S/P SINUS SURGERY: Status: ACTIVE | Noted: 2020-10-23

## 2020-10-23 PROBLEM — J32.0 CHRONIC MAXILLARY SINUSITIS: Status: RESOLVED | Noted: 2020-08-06 | Resolved: 2020-10-23

## 2020-10-23 PROBLEM — J01.81 OTHER ACUTE RECURRENT SINUSITIS: Status: RESOLVED | Noted: 2020-09-17 | Resolved: 2020-10-23

## 2020-10-23 NOTE — PROGRESS NOTES
Procedure Note    Pre-operative Diagnosis:  s/p recent sinonasal surgery    Post-operative Diagnosis: same    Anesthesia: topical 4% tetracaine and oxymetazoline mix    Endoscopy Type:  Nasal Endoscopy with debridement    Procedure Details:    After topical anesthesia and decongestion, the patient was placed in the sitting position.  An endoscope was passed along the left nasal floor to the nasopharynx.  It was then passed into the region of the middle meatus, middle turbinate, and the sphenoethmoid region. Debridement of postoperative crusting and removal of old blood and retained secretions was performed with forceps and suctioning. An identical procedure was performed on the right side.  The following findings were noted:    Findings:  Intranasal Exam: mucosa normal, vestibule within normal limits, inferior turbinate normal, anterior nasal septum non-obstructing, no polyps seen, nasal mucosa normal, vestibule within normal limits, inferior turbinate normal, nasal septum non-obstructing, no polyps seen, normal postoperative appearance, normal postoperative crusting, no signs of polyp regrowth present,    Condition:  Stable.  Patient tolerated procedure well.    Complications:  None

## 2020-10-23 NOTE — PATIENT INSTRUCTIONS
Resume nasal sprays and return to normal activity, continue treatment as directed, if symptoms develop call for sooner appointment, otherwise follow-up as directed.    The patient understands and agrees with assessment and plan.     No

## 2020-10-25 LAB
CYTO UR: NORMAL
LAB AP CASE REPORT: NORMAL
PATH REPORT.FINAL DX SPEC: NORMAL
PATH REPORT.GROSS SPEC: NORMAL

## 2020-11-06 ENCOUNTER — OFFICE VISIT (OUTPATIENT)
Dept: NEUROLOGY | Facility: CLINIC | Age: 45
End: 2020-11-06

## 2020-11-06 VITALS
HEART RATE: 64 BPM | DIASTOLIC BLOOD PRESSURE: 80 MMHG | BODY MASS INDEX: 37.73 KG/M2 | HEIGHT: 62 IN | OXYGEN SATURATION: 98 % | WEIGHT: 205 LBS | SYSTOLIC BLOOD PRESSURE: 142 MMHG

## 2020-11-06 DIAGNOSIS — G43.109 MIGRAINE WITH AURA AND WITHOUT STATUS MIGRAINOSUS, NOT INTRACTABLE: ICD-10-CM

## 2020-11-06 DIAGNOSIS — Q85.00 NEUROFIBROMATOSIS (HCC): Primary | ICD-10-CM

## 2020-11-06 DIAGNOSIS — C72.30 LOW-GRADE OPTIC PATHWAY GLIOMA (HCC): ICD-10-CM

## 2020-11-06 PROCEDURE — 99213 OFFICE O/P EST LOW 20 MIN: CPT | Performed by: PHYSICIAN ASSISTANT

## 2020-11-06 RX ORDER — MEMANTINE HYDROCHLORIDE 10 MG/1
10 TABLET ORAL 2 TIMES DAILY
Qty: 60 TABLET | Refills: 3 | Status: SHIPPED | OUTPATIENT
Start: 2020-11-06 | End: 2021-03-08 | Stop reason: SDUPTHER

## 2020-11-06 RX ORDER — TOPIRAMATE 200 MG/1
1 CAPSULE, EXTENDED RELEASE ORAL DAILY
Qty: 30 CAPSULE | Refills: 3 | Status: SHIPPED | OUTPATIENT
Start: 2020-11-06 | End: 2021-03-08 | Stop reason: SDUPTHER

## 2020-11-20 LAB
FUNGUS WND CULT: NORMAL
FUNGUS WND CULT: NORMAL

## 2020-11-29 NOTE — PROGRESS NOTES
Subjective   Sandy Estes is a 45 y.o. female is here today for follow-up.    Emgality has resulted in significant improvement with regard to her headaches.  Ubrelvy has been beneficial for her breakthrough migraines.  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    Migraine   This is a chronic problem. The current episode started more than 1 year ago. The problem occurs intermittently. The problem has been rapidly improving. The pain is located in the bilateral, frontal and retro-orbital region. The pain does not radiate. The pain quality is similar to prior headaches. The quality of the pain is described as aching, throbbing and pulsating. The pain is severe. Associated symptoms include back pain, dizziness, neck pain, phonophobia, photophobia, rhinorrhea, scalp tenderness, sinus pressure and vomiting. Pertinent negatives include no weakness. The symptoms are aggravated by activity, bright light, fatigue, emotional stress, noise and Valsalva. She has tried acetaminophen, antidepressants, Excedrin, darkened room and NSAIDs (Emgality) for the symptoms. The treatment provided significant relief. Her past medical history is significant for migraine headaches and migraines in the family.   Headache   This is a chronic problem. The current episode started more than 1 year ago. The problem occurs daily. The problem has been gradually improving. The pain is located in the bilateral, frontal, parietal, vertex and occipital region. The pain radiates to the left neck and right neck. The pain quality is similar to prior headaches. The quality of the pain is described as aching, stabbing and shooting. The pain is severe. Associated symptoms include back pain, dizziness, neck pain, phonophobia, photophobia, rhinorrhea, scalp tenderness, sinus pressure and vomiting. Pertinent negatives include no weakness. The symptoms are aggravated by activity, bright light,  fatigue, emotional stress and Valsalva. She has tried acetaminophen, antidepressants, Excedrin, darkened room, NSAIDs, oxygen, oral narcotics and injectable narcotics (Emgality) for the symptoms. The treatment provided significant relief. Her past medical history is significant for migraine headaches and migraines in the family.        The following portions of the patient's history were reviewed and updated as appropriate: allergies, current medications, past family history, past medical history, past social history, past surgical history and problem list.    Review of Systems   Constitutional: Negative.    HENT: Positive for congestion, rhinorrhea and sinus pressure.    Eyes: Positive for photophobia.   Gastrointestinal: Positive for vomiting.   Musculoskeletal: Positive for back pain and neck pain.   Neurological: Positive for dizziness and headache. Negative for weakness.   Psychiatric/Behavioral: Positive for sleep disturbance. The patient is nervous/anxious.          Current Outpatient Medications:   •  ALPRAZolam (XANAX) 1 MG tablet, Take 1 mg by mouth 3 (Three) Times a Day As Needed for Anxiety., Disp: , Rfl:   •  amLODIPine (NORVASC) 5 MG tablet, TAKE ONE TABLET BY MOUTH EVERY DAY, Disp: 30 tablet, Rfl: 5  •  azelastine (ASTELIN) 0.1 % nasal spray, 2 sprays into the nostril(s) as directed by provider 2 (Two) Times a Day. Use in each nostril as directed, Disp: 30 mL, Rfl: 11  •  carvedilol (COREG) 6.25 MG tablet, Take 6.25 mg by mouth 2 (Two) Times a Day With Meals., Disp: , Rfl:   •  cetirizine (zyrTEC) 10 MG tablet, Take 10 mg by mouth Daily., Disp: , Rfl:   •  dicyclomine (BENTYL) 10 MG capsule, Take 10 mg by mouth 4 (Four) Times a Day Before Meals & at Bedtime., Disp: , Rfl:   •  DULoxetine (CYMBALTA) 60 MG capsule, Take 90 mg by mouth Daily., Disp: , Rfl:   •  fenofibrate (TRICOR) 54 MG tablet, Take 1 tablet by mouth Daily., Disp: , Rfl:   •  FIBER COMPLETE PO, Take 1 dose by mouth Daily As Needed (for  constipation)., Disp: , Rfl:   •  fluticasone (FLONASE) 50 MCG/ACT nasal spray, 2 sprays into the nostril(s) as directed by provider Daily., Disp: 16 g, Rfl: 11  •  hydrOXYzine (ATARAX) 25 MG tablet, Take 25 mg by mouth Every 4 (Four) Hours As Needed., Disp: , Rfl:   •  losartan (COZAAR) 100 MG tablet, Take 100 mg by mouth Daily., Disp: , Rfl:   •  memantine (NAMENDA) 10 MG tablet, Take 1 tablet by mouth 2 (Two) Times a Day., Disp: 60 tablet, Rfl: 3  •  pantoprazole (PROTONIX) 40 MG EC tablet, Take 40 mg by mouth Daily., Disp: , Rfl:   •  rizatriptan MLT (MAXALT-MLT) 5 MG disintegrating tablet, DISSOLVE 1 TABLET UNDER TONGUE FOR HEADACHE ...MAY REPEAT IN 2 HRS IF NEEDED..NO MORE THAN 2/24HRS!, Disp: 9 tablet, Rfl: 0  •  tiZANidine (ZANAFLEX) 2 MG tablet, Take 2 mg by mouth Every Night., Disp: , Rfl:   •  Trokendi  MG capsule sustained-release 24 hr, Take 1 capsule by mouth Daily., Disp: 30 capsule, Rfl: 3  •  ubrogepant (ubrogepant) 50 MG tablet, Take 1 tablet by mouth As Needed (Migraine). May repeat in 2 hours if needed, max 2 tabs in 24 hours., Disp: 10 tablet, Rfl: 12  •  zolpidem (AMBIEN) 10 MG tablet, Take 10 mg by mouth At Night As Needed for Sleep., Disp: , Rfl:   •  galcanezumab-gnlm (EMGALITY) 120 MG/ML prefilled syringe, Inject 1 mL under the skin into the appropriate area as directed Every 30 (Thirty) Days., Disp: 1 pen, Rfl: 12     Objective   Physical Exam  Vitals signs and nursing note reviewed.   HENT:      Head: Normocephalic.      Right Ear: Hearing and external ear normal.      Left Ear: Hearing and external ear normal.      Nose: Nose normal.      Mouth/Throat:      Pharynx: Oropharynx is clear.   Eyes:      General: Lids are normal. Vision grossly intact. Gaze aligned appropriately. No scleral icterus.     Extraocular Movements: Extraocular movements intact.      Conjunctiva/sclera: Conjunctivae normal.      Pupils: Pupils are equal, round, and reactive to light.      Visual Fields: Right  eye visual fields normal and left eye visual fields normal.   Neck:      Musculoskeletal: Normal range of motion.      Vascular: No carotid bruit or JVD.      Trachea: Trachea and phonation normal.   Cardiovascular:      Rate and Rhythm: Normal rate.      Heart sounds: Normal heart sounds.   Pulmonary:      Effort: Pulmonary effort is normal.      Breath sounds: Normal breath sounds.   Skin:     General: Skin is warm and dry.   Neurological:      Mental Status: She is alert and oriented to person, place, and time.      GCS: GCS eye subscore is 4. GCS verbal subscore is 5. GCS motor subscore is 6.      Cranial Nerves: Cranial nerves are intact.      Sensory: Sensation is intact.      Motor: Motor function is intact.      Coordination: Coordination is intact.      Gait: Gait is intact.      Deep Tendon Reflexes: Reflexes are normal and symmetric.   Psychiatric:         Attention and Perception: Attention and perception normal.         Mood and Affect: Mood and affect normal.         Speech: Speech normal.         Behavior: Behavior normal.         Thought Content: Thought content normal.         Cognition and Memory: Cognition and memory normal.         Judgment: Judgment normal.           Assessment/Plan   Diagnoses and all orders for this visit:    1. Neurofibromatosis (CMS/HCC) (Primary)  -     MRI Brain With & Without Contrast; Future    2. Low-grade optic pathway glioma (CMS/HCC)    3. Migraine with aura and without status migrainosus, not intractable  -     memantine (NAMENDA) 10 MG tablet; Take 1 tablet by mouth 2 (Two) Times a Day.  Dispense: 60 tablet; Refill: 3  -     Trokendi  MG capsule sustained-release 24 hr; Take 1 capsule by mouth Daily.  Dispense: 30 capsule; Refill: 3  -     galcanezumab-gnlm (EMGALITY) 120 MG/ML prefilled syringe; Inject 1 mL under the skin into the appropriate area as directed Every 30 (Thirty) Days.  Dispense: 1 pen; Refill: 12  -     MRI Brain With & Without Contrast;  Future    The patient is neurologically stable.  Follow-up MRIs will be necessary.  This is reviewed with the patient.    We are having success treating her migraines particularly with the addition of Emgality and ubrogepant.      15 minutes of a 20 minute outpatient visit was spent in counseling and coordination of care today.           Dictated utilizing Dragon dictation.

## 2020-12-01 ENCOUNTER — TRANSCRIBE ORDERS (OUTPATIENT)
Dept: ADMINISTRATIVE | Facility: HOSPITAL | Age: 45
End: 2020-12-01

## 2021-01-15 ENCOUNTER — HOSPITAL ENCOUNTER (OUTPATIENT)
Dept: WOMENS IMAGING | Age: 46
Discharge: HOME | End: 2021-01-15
Payer: MEDICAID

## 2021-01-15 ENCOUNTER — OFFICE VISIT (OUTPATIENT)
Dept: SURGERY | Age: 46
End: 2021-01-15
Payer: MEDICAID

## 2021-01-15 VITALS
HEART RATE: 52 BPM | SYSTOLIC BLOOD PRESSURE: 119 MMHG | DIASTOLIC BLOOD PRESSURE: 76 MMHG | WEIGHT: 212 LBS | BODY MASS INDEX: 40.02 KG/M2 | HEIGHT: 61 IN | TEMPERATURE: 97.1 F

## 2021-01-15 DIAGNOSIS — Z12.31 VISIT FOR SCREENING MAMMOGRAM: ICD-10-CM

## 2021-01-15 DIAGNOSIS — Z12.31 VISIT FOR SCREENING MAMMOGRAM: Primary | ICD-10-CM

## 2021-01-15 PROCEDURE — G8484 FLU IMMUNIZE NO ADMIN: HCPCS | Performed by: PHYSICIAN ASSISTANT

## 2021-01-15 PROCEDURE — 77063 BREAST TOMOSYNTHESIS BI: CPT

## 2021-01-15 PROCEDURE — 99213 OFFICE O/P EST LOW 20 MIN: CPT | Performed by: PHYSICIAN ASSISTANT

## 2021-01-15 PROCEDURE — G8417 CALC BMI ABV UP PARAM F/U: HCPCS | Performed by: PHYSICIAN ASSISTANT

## 2021-01-15 PROCEDURE — 1036F TOBACCO NON-USER: CPT | Performed by: PHYSICIAN ASSISTANT

## 2021-01-15 PROCEDURE — G8427 DOCREV CUR MEDS BY ELIG CLIN: HCPCS | Performed by: PHYSICIAN ASSISTANT

## 2021-01-15 RX ORDER — RIZATRIPTAN BENZOATE 5 MG/1
TABLET, ORALLY DISINTEGRATING ORAL
COMMUNITY

## 2021-01-15 RX ORDER — AMLODIPINE BESYLATE 5 MG/1
TABLET ORAL
COMMUNITY
Start: 2020-08-20

## 2021-01-15 RX ORDER — TOPIRAMATE 200 MG/1
1 CAPSULE, EXTENDED RELEASE ORAL DAILY
COMMUNITY
Start: 2020-11-06

## 2021-01-15 NOTE — PROGRESS NOTES
HPI:  Melissa Durham is in for follow-up breast check. She has not noticed any changes in her breasts. I calculated her Varsha Vale risk assessment today and she has a 5.1% lifetime risk of breast cancer. She has had prior genetic testing and it was negative. BREAST EXAM:  On examination, she has fibrocystic changes throughout both breasts, no dominant masses, no skin or nipple changes and no axillary adenopathy. I see nothing suspicious for breast cancer. ASSESSMENT:  Benign fibrocystic changes              PLAN:  I will plan to see her back in 1 year for physical exam and bilateral mammograms. She will contact me if anything significant changes.

## 2021-01-18 ENCOUNTER — TRANSCRIBE ORDERS (OUTPATIENT)
Dept: LAB | Facility: HOSPITAL | Age: 46
End: 2021-01-18

## 2021-01-18 DIAGNOSIS — Z01.818 PRE-OP TESTING: Primary | ICD-10-CM

## 2021-01-20 NOTE — PROGRESS NOTES
YOB: 1975  Location: [facility]  Location Address: [address] [place] [address], [facility], [address] [place] [address]  Location Phone: [phone]    Chief Complaint   Patient presents with   • Follow-up       History of Present Illness  Sandy Estes is a 45 y.o. female.  Sandy Estes is here for follow up of ENT complaints. The patient has had problems with postnasal drip and allergy.  The symptoms are localized to the bilateral nose. The patient has had variable symptoms. The symptoms have been present for the last several months The symptoms are aggravated by  allergy and weather change. The symptoms are improved by antihistamines, intranasal fluticasone, intranasal azelastine and recent sinus surgery.       Past Medical History:   Diagnosis Date   • Anxiety    • Asthma    • Brain tumor, glioma (CMS/HCC)     Optic chiasm   • Depression    • GERD (gastroesophageal reflux disease)    • Headache    • Hyperlipidemia    • Hypertension    • Migraine    • Neurofibromatosis (CMS/HCC)     6 months old dx       Past Surgical History:   Procedure Laterality Date   • CARDIAC SURGERY     • ENDOSCOPIC FUNCTIONAL SINUS SURGERY (FESS) Bilateral 10/9/2020    Procedure: ENDOSCOPIC FUNCTIONAL SINUS SURGERY W TRACKING, BILATERAL MAXILLARY AND LEFT SPHENOID SINUPLASTY,  RESECT INFERIOR TURBINATES VIA COBLATION;  Surgeon: Bairon Ramirez MD;  Location: [facility];  Service: ENT;  Laterality: Bilateral;   • GALLBLADDER SURGERY     • HYSTERECTOMY     • OTHER SURGICAL HISTORY      repaired infundibular & valvular PS: res. trivial PS and Mild to Mod. PI (age 8)   • TONSILLECTOMY AND ADENOIDECTOMY         Outpatient Medications Marked as Taking for the 21 encounter (Office Visit) with Bairon Ramirez MD   Medication Sig Dispense Refill   • ALPRAZolam (XANAX) 1 MG tablet Take 1 mg by mouth 3 (Three) Times a Day As Needed for Anxiety.     • amLODIPine (NORVASC) 5 MG tablet TAKE ONE TABLET BY MOUTH EVERY DAY 30 tablet 5    • azelastine (ASTELIN) 0.1 % nasal spray 2 sprays into the nostril(s) as directed by provider 2 (Two) Times a Day. Use in each nostril as directed 30 mL 11   • carvedilol (COREG) 6.25 MG tablet Take 6.25 mg by mouth 2 (Two) Times a Day With Meals.     • cetirizine (zyrTEC) 10 MG tablet Take 10 mg by mouth Daily.     • diclofenac (VOLTAREN) 75 MG EC tablet      • dicyclomine (BENTYL) 10 MG capsule Take 10 mg by mouth 4 (Four) Times a Day Before Meals & at Bedtime.     • escitalopram (LEXAPRO) 20 MG tablet      • fenofibrate (TRICOR) 54 MG tablet Take 1 tablet by mouth Daily.     • FIBER COMPLETE PO Take 1 dose by mouth Daily As Needed (for constipation).     • fluticasone (FLONASE) 50 MCG/ACT nasal spray 2 sprays into the nostril(s) as directed by provider Daily. 16 g 11   • galcanezumab-gnlm (EMGALITY) 120 MG/ML prefilled syringe Inject 1 mL under the skin into the appropriate area as directed Every 30 (Thirty) Days. 1 pen 12   • hydrOXYzine (ATARAX) 50 MG tablet      • losartan (COZAAR) 100 MG tablet Take 100 mg by mouth Daily.     • memantine (NAMENDA) 10 MG tablet Take 1 tablet by mouth 2 (Two) Times a Day. 60 tablet 3   • montelukast (SINGULAIR) 10 MG tablet      • pantoprazole (PROTONIX) 40 MG EC tablet Take 40 mg by mouth Daily.     • rizatriptan MLT (MAXALT-MLT) 5 MG disintegrating tablet rizatriptan 5 mg disintegrating tablet     • tiZANidine (ZANAFLEX) 2 MG tablet Take 2 mg by mouth Every Night.     • Trokendi  MG capsule sustained-release 24 hr Take 1 capsule by mouth Daily. 30 capsule 3   • ubrogepant (ubrogepant) 50 MG tablet Take 1 tablet by mouth As Needed (Migraine). May repeat in 2 hours if needed, max 2 tabs in 24 hours. 10 tablet 12   • zolpidem (AMBIEN) 10 MG tablet Take 10 mg by mouth At Night As Needed for Sleep.     • [DISCONTINUED] DULoxetine (CYMBALTA) 60 MG capsule Take 90 mg by mouth Daily.     • [DISCONTINUED] hydrOXYzine (ATARAX) 25 MG tablet Take 25 mg by mouth Every 4 (Four)  Hours As Needed.         Penicillin g, Penicillins, Viibryd [vilazodone hcl], Amoxil [amoxicillin], Biaxin [clarithromycin], Cefzil [cefprozil], Clindamycin/lincomycin, Doxycycline, Lisinopril, Sulfa antibiotics, Sulfasalazine, Vancomycin, and Zofran [ondansetron hcl]    Family History   Problem Relation Age of Onset   • Breast cancer Mother    • Heart disease Father    • Hypertension Father    • Diabetes Father        Social History     Socioeconomic History   • Marital status: Single     Spouse name: Not on file   • Number of children: Not on file   • Years of education: Not on file   • Highest education level: Not on file   Tobacco Use   • Smoking status: Former Smoker     Types: Cigarettes   • Smokeless tobacco: Never Used   • Tobacco comment: quit 7 years ago   Substance and Sexual Activity   • Alcohol use: No   • Drug use: No   • Sexual activity: Defer       Review of Systems   Constitutional: Negative for activity change, appetite change, chills, diaphoresis, fatigue, fever and unexpected weight change.   HENT: Positive for postnasal drip. Negative for congestion, dental problem, drooling, ear discharge, ear pain, facial swelling, hearing loss, mouth sores, nosebleeds, rhinorrhea, sinus pressure, sneezing, sore throat, tinnitus, trouble swallowing and voice change.    Eyes: Negative.    Respiratory: Negative.    Cardiovascular: Negative.    Gastrointestinal: Negative.    Endocrine: Negative.    Skin: Negative.    Allergic/Immunologic: Positive for environmental allergies. Negative for food allergies and immunocompromised state.   Neurological: Negative.    Hematological: Negative.    Psychiatric/Behavioral: Negative.        Vitals:    01/21/21 1504   BP: 148/83   Pulse: 68   Temp: 97.7 °F (36.5 °C)       Body mass index is 39.69 kg/m².    Objective     Physical Exam  Physical Exam  CONSTITUTIONAL: well nourished, alert, oriented, in no acute distress      COMMUNICATION AND VOICE: able to communicate normally,  normal voice quality     HEAD: normocephalic, no lesions, atraumatic, no tenderness, no masses      FACE: appearance normal, no lesions, no tenderness, no deformities, facial motion symmetric     SALIVARY GLANDS: parotid glands with no tenderness, no swelling, no masses, submandibular glands with normal size, nontender     EYES: ocular motility normal, eyelids normal, orbits normal, no proptosis, conjunctiva normal , pupils equal, round      EARS:  Hearing: response to conversational voice normal bilaterally   External Ears: auricles without lesions  Otoscopic: tympanic membrane appearance normal, no lesions, no perforation, normal mobility, no fluid     NOSE:  External Nose: structure normal, no tenderness on palpation, no nasal discharge, no lesions, no evidence of trauma, nostrils patent   Intranasal Exam: nasal mucosa normal, vestibule within normal limits, inferior turbinate normal, nasal septum midline      ORAL:  Lips: upper and lower lips without lesion   Teeth: dentition within normal limits for age   Gums: gingivae healthy   Oral Mucosa: oral mucosa normal, no mucosal lesions   Floor of Mouth: Warthin’s duct patent, mucosa normal  Tongue: lingual mucosa normal without lesions, normal tongue mobility   Palate: soft and hard palates with normal mucosa and structure  Oropharynx: oropharyngeal mucosa mild erythema with mild postnasal drainage     NECK: neck appearance normal, no mass,  noted without erythema or tenderness     THYROID: no overt thyromegaly, no tenderness, nodules or mass present on palpation, position midline      LYMPH NODES: no lymphadenopathy     CHEST/RESPIRATORY: respiratory effort normaL     CARDIOVASCULAR: extremities without cyanosis or edema       NEUROLOGIC/PSYCHIATRIC: oriented to time, place and person, mood normal, affect appropriate, CN II-XII intact grossly    Assessment/Plan   Diagnoses and all orders for this visit:    1. Allergic rhinitis with postnasal drip (Primary)    2.  S/P sinus surgery      * Surgery not found *  No orders of the defined types were placed in this encounter.    Return in about 3 months (around 4/21/2021) for Recheck sinuses.       Patient Instructions   Continue treatment as directed, if symptoms develop call for sooner appointment, otherwise follow-up as directed.    The patient understands and agrees with assessment and plan.      Use Afrin spray, neosynephrine or other decongestant spray for 3 days. After using, use a [place] pot or Neilmed Sinus Rinse or similar nasal irrigation device. Do not use the decongestant spray for more than 3 days or you may develop a rebound rhinitis/ nasal congestion. Make sure you boil the water before mixing the saline and let the mix cool to a comfortable temperature before using.

## 2021-01-21 ENCOUNTER — OFFICE VISIT (OUTPATIENT)
Dept: OTOLARYNGOLOGY | Facility: CLINIC | Age: 46
End: 2021-01-21

## 2021-01-21 VITALS
DIASTOLIC BLOOD PRESSURE: 83 MMHG | HEART RATE: 68 BPM | TEMPERATURE: 97.7 F | WEIGHT: 217 LBS | BODY MASS INDEX: 39.93 KG/M2 | HEIGHT: 62 IN | SYSTOLIC BLOOD PRESSURE: 148 MMHG

## 2021-01-21 DIAGNOSIS — R09.82 ALLERGIC RHINITIS WITH POSTNASAL DRIP: Primary | ICD-10-CM

## 2021-01-21 DIAGNOSIS — Z98.890 S/P SINUS SURGERY: ICD-10-CM

## 2021-01-21 DIAGNOSIS — J30.9 ALLERGIC RHINITIS WITH POSTNASAL DRIP: Primary | ICD-10-CM

## 2021-01-21 PROCEDURE — 99213 OFFICE O/P EST LOW 20 MIN: CPT | Performed by: PHYSICIAN ASSISTANT

## 2021-01-21 RX ORDER — MONTELUKAST SODIUM 10 MG/1
10 TABLET ORAL NIGHTLY
COMMUNITY
Start: 2020-11-24

## 2021-01-21 RX ORDER — HYDROXYZINE 50 MG/1
50 TABLET, FILM COATED ORAL EVERY 4 HOURS PRN
COMMUNITY
Start: 2020-11-24

## 2021-01-21 RX ORDER — RIZATRIPTAN BENZOATE 5 MG/1
TABLET, ORALLY DISINTEGRATING ORAL
COMMUNITY
End: 2021-03-02

## 2021-01-21 RX ORDER — DICLOFENAC SODIUM 75 MG/1
75 TABLET, DELAYED RELEASE ORAL 2 TIMES DAILY
COMMUNITY
Start: 2020-11-24 | End: 2022-05-18

## 2021-01-21 RX ORDER — ESCITALOPRAM OXALATE 20 MG/1
20 TABLET ORAL DAILY
COMMUNITY
Start: 2020-11-19

## 2021-01-21 NOTE — PATIENT INSTRUCTIONS
Continue treatment as directed, if symptoms develop call for sooner appointment, otherwise follow-up as directed.    The patient understands and agrees with assessment and plan.      Use Afrin spray, neosynephrine or other decongestant spray for 3 days. After using, use a [place] pot or Neilmed Sinus Rinse or similar nasal irrigation device. Do not use the decongestant spray for more than 3 days or you may develop a rebound rhinitis/ nasal congestion. Make sure you boil the water before mixing the saline and let the mix cool to a comfortable temperature before using.

## 2021-01-28 ENCOUNTER — TELEPHONE (OUTPATIENT)
Dept: NEUROLOGY | Facility: CLINIC | Age: 46
End: 2021-01-28

## 2021-01-28 NOTE — TELEPHONE ENCOUNTER
Pt called in stating she was tired of waiting on the PA for emgality so she called them herself and asked what needed to be done to get the medication approved and they told her they need Colton Mathias to contact them and also state that the pt has tried other medications and emgality is the only thing that has helped her. States he would also need to put in the information that pt would need the emgality for longer than 3 months.     They gave her a reference key - [id number]    Call pt if any questions

## 2021-02-19 ENCOUNTER — NURSE TRIAGE (OUTPATIENT)
Dept: CALL CENTER | Facility: HOSPITAL | Age: 46
End: 2021-02-19

## 2021-02-19 NOTE — TELEPHONE ENCOUNTER
"Caller states fall coming outside of work tonight and hit knee. Caller states pain eight and currently has elevated. Caller states already on anti-inflammatory medication. Caller states unable to bear weight on. Advised per guideline and to call back as needed.     Reason for Disposition  • [1] SEVERE pain (e.g., excruciating, unable to walk) AND [2] not improved after 2 hours of pain medicine    Additional Information  • Negative: Sounds like a life-threatening emergency to the triager  • Negative: Followed a knee injury  • Negative: Leg pain is main symptom  • Negative: Knee swelling is main symptom  • Negative: [1] Swollen joint AND [2] fever  • Negative: [1] Red area or streak AND [2] fever  • Negative: Patient sounds very sick or weak to the triager    Answer Assessment - Initial Assessment Questions  1. LOCATION and RADIATION: \"Where is the pain located?\"       Left knee   2. QUALITY: \"What does the pain feel like?\"  (e.g., sharp, dull, aching, burning)      Can be sharp and sometimes just dull or burning   3. SEVERITY: \"How bad is the pain?\" \"What does it keep you from doing?\"   (Scale 1-10; or mild, moderate, severe)    -  MILD (1-3): doesn't interfere with normal activities     -  MODERATE (4-7): interferes with normal activities (e.g., work or school) or awakens from sleep, limping     -  SEVERE (8-10): excruciating pain, unable to do any normal activities, unable to walk      8  4. ONSET: \"When did the pain start?\" \"Does it come and go, or is it there all the time?\"      Constant and especially with movement. Hurt about 2330  5. RECURRENT: \"Have you had this pain before?\" If so, ask: \"When, and what happened then?\"      Denies   6. SETTING: \"Has there been any recent work, exercise or other activity that involved that part of the body?\"       Coming out of work   7. AGGRAVATING FACTORS: \"What makes the knee pain worse?\" (e.g., walking, climbing stairs, running)      Walking on it   8. ASSOCIATED SYMPTOMS: " "\"Is there any swelling or redness of the knee?\"      Some swelling and bruising   9. OTHER SYMPTOMS: \"Do you have any other symptoms?\" (e.g., chest pain, difficulty breathing, fever, calf pain)      Denies   10. PREGNANCY: \"Is there any chance you are pregnant?\" \"When was your last menstrual period?\"        Denies    Protocols used: KNEE PAIN-ADULT-AH      "

## 2021-02-20 DIAGNOSIS — G43.119 INTRACTABLE MIGRAINE WITH AURA WITHOUT STATUS MIGRAINOSUS: ICD-10-CM

## 2021-02-22 RX ORDER — AMLODIPINE BESYLATE 5 MG/1
TABLET ORAL
Qty: 30 TABLET | Refills: 5 | Status: SHIPPED | OUTPATIENT
Start: 2021-02-22 | End: 2021-08-30

## 2021-03-01 ENCOUNTER — HOSPITAL ENCOUNTER (OUTPATIENT)
Dept: MRI IMAGING | Facility: HOSPITAL | Age: 46
Discharge: HOME | End: 2021-03-01
Admitting: PHYSICIAN ASSISTANT

## 2021-03-01 DIAGNOSIS — G43.109 MIGRAINE WITH AURA AND WITHOUT STATUS MIGRAINOSUS, NOT INTRACTABLE: ICD-10-CM

## 2021-03-01 DIAGNOSIS — Q85.00 NEUROFIBROMATOSIS (HCC): ICD-10-CM

## 2021-03-01 LAB — CREAT BLDA-MCNC: 0.9 MG/DL (ref 0.6–1.3)

## 2021-03-01 PROCEDURE — 0 GADOBENATE DIMEGLUMINE 529 MG/ML SOLUTION: Performed by: PHYSICIAN ASSISTANT

## 2021-03-01 PROCEDURE — 82565 ASSAY OF CREATININE: CPT

## 2021-03-01 PROCEDURE — 70553 MRI BRAIN STEM W/O & W/DYE: CPT

## 2021-03-01 PROCEDURE — A9577 INJ MULTIHANCE: HCPCS | Performed by: PHYSICIAN ASSISTANT

## 2021-03-01 RX ADMIN — GADOBENATE DIMEGLUMINE 14 ML: 529 INJECTION, SOLUTION INTRAVENOUS at 11:16

## 2021-03-02 DIAGNOSIS — G43.119 INTRACTABLE MIGRAINE WITH AURA WITHOUT STATUS MIGRAINOSUS: Primary | ICD-10-CM

## 2021-03-02 RX ORDER — RIZATRIPTAN BENZOATE 5 MG/1
TABLET, ORALLY DISINTEGRATING ORAL
Qty: 9 TABLET | Refills: 0 | Status: SHIPPED | OUTPATIENT
Start: 2021-03-02 | End: 2021-06-17

## 2021-03-08 ENCOUNTER — OFFICE VISIT (OUTPATIENT)
Dept: NEUROLOGY | Facility: CLINIC | Age: 46
End: 2021-03-08

## 2021-03-08 VITALS
WEIGHT: 215 LBS | DIASTOLIC BLOOD PRESSURE: 80 MMHG | HEART RATE: 62 BPM | BODY MASS INDEX: 39.56 KG/M2 | HEIGHT: 62 IN | SYSTOLIC BLOOD PRESSURE: 134 MMHG | OXYGEN SATURATION: 98 %

## 2021-03-08 DIAGNOSIS — G43.109 MIGRAINE WITH AURA AND WITHOUT STATUS MIGRAINOSUS, NOT INTRACTABLE: ICD-10-CM

## 2021-03-08 DIAGNOSIS — C72.30 LOW-GRADE OPTIC PATHWAY GLIOMA (HCC): ICD-10-CM

## 2021-03-08 DIAGNOSIS — Q85.00 NEUROFIBROMATOSIS (HCC): Primary | ICD-10-CM

## 2021-03-08 PROCEDURE — 99214 OFFICE O/P EST MOD 30 MIN: CPT | Performed by: PHYSICIAN ASSISTANT

## 2021-03-08 RX ORDER — TOPIRAMATE 200 MG/1
1 CAPSULE, EXTENDED RELEASE ORAL DAILY
Qty: 30 CAPSULE | Refills: 3 | Status: SHIPPED | OUTPATIENT
Start: 2021-03-08 | End: 2021-04-08

## 2021-03-08 RX ORDER — MEMANTINE HYDROCHLORIDE 10 MG/1
10 TABLET ORAL 2 TIMES DAILY
Qty: 60 TABLET | Refills: 3 | Status: SHIPPED | OUTPATIENT
Start: 2021-03-08 | End: 2021-06-15

## 2021-03-08 NOTE — PROGRESS NOTES
Subjective   Sandy Estes is a 45 y.o. female is here today for follow-up.    Emgality has resulted in significant improvement with regard to her headaches.  Ubrelvy has been beneficial for her breakthrough migraines.  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    Migraine   This is a chronic problem. The current episode started more than 1 year ago. The problem occurs intermittently. The problem has been rapidly improving. The pain is located in the bilateral, frontal and retro-orbital region. The pain does not radiate. The pain quality is similar to prior headaches. The quality of the pain is described as aching, throbbing and pulsating. The pain is severe. Associated symptoms include back pain, dizziness, neck pain, phonophobia, photophobia, rhinorrhea, scalp tenderness, sinus pressure and vomiting. Pertinent negatives include no weakness. The symptoms are aggravated by activity, bright light, fatigue, emotional stress, noise and Valsalva. She has tried acetaminophen, antidepressants, Excedrin, darkened room and NSAIDs (Emgality) for the symptoms. The treatment provided significant relief. Her past medical history is significant for migraine headaches and migraines in the family.   Headache   This is a chronic problem. The current episode started more than 1 year ago. The problem occurs daily. The problem has been gradually improving. The pain is located in the bilateral, frontal, parietal, vertex and occipital region. The pain radiates to the left neck and right neck. The pain quality is similar to prior headaches. The quality of the pain is described as aching, stabbing and shooting. The pain is severe. Associated symptoms include back pain, dizziness, neck pain, phonophobia, photophobia, rhinorrhea, scalp tenderness, sinus pressure and vomiting. Pertinent negatives include no weakness. The symptoms are aggravated by activity, bright light, fatigue,  emotional stress and Valsalva. She has tried acetaminophen, antidepressants, Excedrin, darkened room, NSAIDs, oxygen, oral narcotics and injectable narcotics (Emgality) for the symptoms. The treatment provided significant relief. Her past medical history is significant for migraine headaches and migraines in the family.        The following portions of the patient's history were reviewed and updated as appropriate: allergies, current medications, past family history, past medical history, past social history, past surgical history and problem list.    Review of Systems   Constitutional: Negative.    HENT: Positive for congestion, rhinorrhea and sinus pressure.    Eyes: Positive for photophobia and visual disturbance.   Gastrointestinal: Positive for vomiting.   Musculoskeletal: Positive for back pain, gait problem, joint swelling (Subacute Knee injury, Left) and neck pain.   Neurological: Positive for dizziness and headache. Negative for weakness.   Psychiatric/Behavioral: Positive for sleep disturbance. The patient is nervous/anxious.          Current Outpatient Medications:   •  ALPRAZolam (XANAX) 1 MG tablet, Take 1 mg by mouth 3 (Three) Times a Day As Needed for Anxiety., Disp: , Rfl:   •  amLODIPine (NORVASC) 5 MG tablet, TAKE ONE TABLET BY MOUTH EVERY DAY (Patient taking differently: Take 5 mg by mouth Daily.), Disp: 30 tablet, Rfl: 5  •  azelastine (ASTELIN) 0.1 % nasal spray, 2 sprays into the nostril(s) as directed by provider 2 (Two) Times a Day. Use in each nostril as directed, Disp: 30 mL, Rfl: 11  •  carvedilol (COREG) 6.25 MG tablet, Take 6.25 mg by mouth 2 (Two) Times a Day With Meals., Disp: , Rfl:   •  cetirizine (zyrTEC) 10 MG tablet, Take 10 mg by mouth Daily., Disp: , Rfl:   •  diclofenac (VOLTAREN) 75 MG EC tablet, Take 75 mg by mouth 2 (Two) Times a Day., Disp: , Rfl:   •  escitalopram (LEXAPRO) 20 MG tablet, Take 20 mg by mouth Daily., Disp: , Rfl:   •  fenofibrate (TRICOR) 54 MG tablet, Take  1 tablet by mouth Daily., Disp: , Rfl:   •  FIBER COMPLETE PO, Take 1 dose by mouth Daily As Needed (for constipation)., Disp: , Rfl:   •  fluticasone (FLONASE) 50 MCG/ACT nasal spray, 2 sprays into the nostril(s) as directed by provider Daily., Disp: 16 g, Rfl: 11  •  hydrOXYzine (ATARAX) 50 MG tablet, Take 50 mg by mouth Every 4 (Four) Hours As Needed., Disp: , Rfl:   •  losartan (COZAAR) 100 MG tablet, Take 100 mg by mouth Daily., Disp: , Rfl:   •  montelukast (SINGULAIR) 10 MG tablet, Take 10 mg by mouth Every Night., Disp: , Rfl:   •  pantoprazole (PROTONIX) 40 MG EC tablet, Take 40 mg by mouth Daily., Disp: , Rfl:   •  rizatriptan MLT (MAXALT-MLT) 5 MG disintegrating tablet, DISSOLVE 1 TABLET UNDER TONGUE FOR HEADACHE ...MAY REPEAT IN 2 HRS IF NEEDED..NO MORE THAN 2/24HRS!, Disp: 9 tablet, Rfl: 0  •  tiZANidine (ZANAFLEX) 2 MG tablet, Take 2 mg by mouth Every Night., Disp: , Rfl:   •  ubrogepant (ubrogepant) 50 MG tablet, Take 1 tablet by mouth As Needed (Migraine). May repeat in 2 hours if needed, max 2 tabs in 24 hours., Disp: 10 tablet, Rfl: 12  •  zolpidem (AMBIEN) 10 MG tablet, Take 10 mg by mouth At Night As Needed for Sleep., Disp: , Rfl:   •  galcanezumab-gnlm (EMGALITY) 120 MG/ML prefilled syringe, Inject 1 mL under the skin into the appropriate area as directed Every 30 (Thirty) Days., Disp: 1 pen, Rfl: 12  •  memantine (NAMENDA) 10 MG tablet, Take 1 tablet by mouth 2 (Two) Times a Day., Disp: 60 tablet, Rfl: 3  •  Trokendi  MG capsule sustained-release 24 hr, Take 1 capsule by mouth Daily., Disp: 30 capsule, Rfl: 3     Objective   Physical Exam  Vitals and nursing note reviewed.   HENT:      Head: Normocephalic.      Right Ear: Hearing and external ear normal.      Left Ear: Hearing and external ear normal.   Eyes:      General: Lids are normal. Vision grossly intact. Gaze aligned appropriately. No scleral icterus.     Extraocular Movements: Extraocular movements intact.       Conjunctiva/sclera: Conjunctivae normal.      Pupils: Pupils are equal, round, and reactive to light.   Neck:      Vascular: No carotid bruit or JVD.      Trachea: Trachea and phonation normal.   Cardiovascular:      Rate and Rhythm: Normal rate.      Heart sounds: Normal heart sounds.   Pulmonary:      Effort: Pulmonary effort is normal.      Breath sounds: Normal breath sounds.   Skin:     General: Skin is warm and dry.   Neurological:      Mental Status: She is alert and oriented to person, place, and time.      GCS: GCS eye subscore is 4. GCS verbal subscore is 5. GCS motor subscore is 6.      Cranial Nerves: Cranial nerves are intact.      Sensory: Sensation is intact.      Motor: Motor function is intact.      Coordination: Coordination is intact.      Comments: Left lower extremity immobilized due to knee injury, gait not tested   Psychiatric:         Attention and Perception: Attention and perception normal.         Mood and Affect: Mood and affect normal.         Speech: Speech normal.         Behavior: Behavior normal.         Thought Content: Thought content normal.         Cognition and Memory: Cognition and memory normal.         Judgment: Judgment normal.       MRI Brain With & Without Contrast (03/01/2021 11:05)                      Assessment/Plan   Diagnoses and all orders for this visit:    1. Neurofibromatosis (CMS/HCC) (Primary)    2. Low-grade optic pathway glioma (CMS/HCC)    3. Migraine with aura and without status migrainosus, not intractable  -     galcanezumab-gnlm (EMGALITY) 120 MG/ML prefilled syringe; Inject 1 mL under the skin into the appropriate area as directed Every 30 (Thirty) Days.  Dispense: 1 pen; Refill: 12  -     memantine (NAMENDA) 10 MG tablet; Take 1 tablet by mouth 2 (Two) Times a Day.  Dispense: 60 tablet; Refill: 3  -     Trokendi  MG capsule sustained-release 24 hr; Take 1 capsule by mouth Daily.  Dispense: 30 capsule; Refill: 3    The patient is stable with  regard to neurofibromatosis.  Her MRI is reviewed in detail.  The patient has had some recent issues with occasional nystagmus as described.  No changes were noted on the MRI with stable optic glioma.  Natural history and expectations with regard to fibromatosis are discussed in detail.    Patient is doing fairly well on her present regimen with regard to migraine.  I have recommended that she continue Emgality and Trokendi as well as Namenda as preventative agents.  She will continue Ubrelvy as needed for breakthrough migraine.    The patient has an appointment with the [facility] orthopedist regarding her left knee injury and I have encouraged her to attend this appointment.    The patient's questions and concerns were reviewed in detail.           Dictated utilizing Dragon dictation.

## 2021-03-16 ENCOUNTER — TELEPHONE (OUTPATIENT)
Dept: NEUROLOGY | Facility: CLINIC | Age: 46
End: 2021-03-16

## 2021-03-16 NOTE — TELEPHONE ENCOUNTER
Provider: WILDER SAMUELS    Caller: NEWTON CASTELLON    Relationship to Patient: SELF    Pharmacy: [facility]    Phone Number: [phone]    Reason for Call: THE PT SAW WILDER SAMUELS ON 3/8 FOR AN APPT AND DURING THE APPT ABRIL GAVE THE PT A PRESCRIPTION SAVINGS CARD TO USE FOR HER MEDICATIONS. SHE STATED SHE WENT TO USE IT FOR HER TROKENDI MEDICATION AND HER PHARMACY TOLD HER THE CARD HAD ALREADY BEEN USED AND TO GET INTO CONTACT WITH US. THE PT'S TROKENDI IS $1,000 OUT OF POCKET WITHOUT THAT CARD. PLEASE GIVE HER A CALL BACK TO DISCUSS THIS.

## 2021-03-17 NOTE — TELEPHONE ENCOUNTER
Sandy notified I sent Daron a message requesting Berny FUNEZ.  She can get a new co-pay card online.

## 2021-03-18 DIAGNOSIS — G43.109 MIGRAINE WITH AURA AND WITHOUT STATUS MIGRAINOSUS, NOT INTRACTABLE: ICD-10-CM

## 2021-03-23 DIAGNOSIS — G43.109 MIGRAINE WITH AURA AND WITHOUT STATUS MIGRAINOSUS, NOT INTRACTABLE: ICD-10-CM

## 2021-03-23 RX ORDER — TOPIRAMATE 200 MG/1
1 CAPSULE, EXTENDED RELEASE ORAL DAILY
Qty: 30 CAPSULE | Refills: 3 | OUTPATIENT
Start: 2021-03-23

## 2021-03-23 NOTE — TELEPHONE ENCOUNTER
Sandy said she uses [facility].  Script cancelled at [facility].

## 2021-04-05 DIAGNOSIS — G43.109 MIGRAINE WITH AURA AND WITHOUT STATUS MIGRAINOSUS, NOT INTRACTABLE: ICD-10-CM

## 2021-04-08 RX ORDER — TOPIRAMATE 200 MG/1
CAPSULE, EXTENDED RELEASE ORAL
Qty: 30 CAPSULE | Refills: 5 | Status: SHIPPED | OUTPATIENT
Start: 2021-04-08 | End: 2021-10-13

## 2021-04-14 ENCOUNTER — TRANSCRIBE ORDERS (OUTPATIENT)
Dept: LAB | Facility: HOSPITAL | Age: 46
End: 2021-04-14

## 2021-06-15 DIAGNOSIS — G43.109 MIGRAINE WITH AURA AND WITHOUT STATUS MIGRAINOSUS, NOT INTRACTABLE: ICD-10-CM

## 2021-06-15 RX ORDER — MEMANTINE HYDROCHLORIDE 10 MG/1
TABLET ORAL
Qty: 60 TABLET | Refills: 5 | Status: SHIPPED | OUTPATIENT
Start: 2021-06-15 | End: 2021-12-03

## 2021-06-17 DIAGNOSIS — G43.119 INTRACTABLE MIGRAINE WITH AURA WITHOUT STATUS MIGRAINOSUS: ICD-10-CM

## 2021-06-17 RX ORDER — RIZATRIPTAN BENZOATE 5 MG/1
TABLET, ORALLY DISINTEGRATING ORAL
Qty: 9 TABLET | Refills: 1 | Status: SHIPPED | OUTPATIENT
Start: 2021-06-17 | End: 2021-12-06 | Stop reason: SDUPTHER

## 2021-06-21 ENCOUNTER — OFFICE VISIT (OUTPATIENT)
Dept: OTOLARYNGOLOGY | Facility: CLINIC | Age: 46
End: 2021-06-21

## 2021-06-21 VITALS
SYSTOLIC BLOOD PRESSURE: 135 MMHG | DIASTOLIC BLOOD PRESSURE: 83 MMHG | WEIGHT: 209.8 LBS | BODY MASS INDEX: 38.61 KG/M2 | HEIGHT: 62 IN | HEART RATE: 68 BPM | TEMPERATURE: 97 F

## 2021-06-21 DIAGNOSIS — J31.0 CHRONIC RHINITIS: Primary | ICD-10-CM

## 2021-06-21 DIAGNOSIS — Z98.890 S/P SINUS SURGERY: ICD-10-CM

## 2021-06-21 DIAGNOSIS — R09.82 POSTNASAL DRIP: ICD-10-CM

## 2021-06-21 PROCEDURE — 99214 OFFICE O/P EST MOD 30 MIN: CPT | Performed by: NURSE PRACTITIONER

## 2021-06-21 RX ORDER — DIAZEPAM 5 MG/1
5 TABLET ORAL DAILY
COMMUNITY
Start: 2021-03-19 | End: 2023-01-09

## 2021-06-21 RX ORDER — GUAIFENESIN 600 MG/1
1200 TABLET, EXTENDED RELEASE ORAL EVERY 12 HOURS SCHEDULED
Qty: 60 TABLET | Refills: 3 | Status: SHIPPED | OUTPATIENT
Start: 2021-06-21 | End: 2021-07-21

## 2021-06-21 RX ORDER — ERGOCALCIFEROL 1.25 MG/1
50000 CAPSULE ORAL
COMMUNITY
Start: 2021-06-03

## 2021-07-12 NOTE — PROGRESS NOTES
PRIMARY CARE PROVIDER: Kenneth Kolb MD  REFERRING PROVIDER: No ref. provider found    Chief Complaint   Patient presents with   • Allergic Rhinitis       Subjective   History of Present Illness:  Sandy Estes is a  46 y.o. female who presents for follow up s/p functional endoscopic anterior ethmoidectomy, bilateral middle turbinectomy with bilateral middle meatal antrostomy, and bilateral inferior turbinate reduction by Dr. Ramirez on 10/9/2020.  She has had a relatively normal postoperative course.  She reports continued right-sided nasal congestion and thick postnasal drip.  She denies any nasal drainage or sinus infections.  She reports the right-sided nasal congestion has improved since surgery and is very mild.  She reports she is breathing out of the left side of her nose at 100% and only breathing out of the right side of her nose at 85%.  She is currently taking Flonase, Astelin, and Singulair.    Past History:  Past Medical History:   Diagnosis Date   • Anxiety    • Asthma    • Brain tumor, glioma (CMS/HCC)     Optic chiasm   • Depression    • GERD (gastroesophageal reflux disease)    • Headache    • Hyperlipidemia    • Hypertension    • Migraine    • Neurofibromatosis (CMS/HCC)     6 months old dx     Past Surgical History:   Procedure Laterality Date   • CARDIAC SURGERY     • ENDOSCOPIC FUNCTIONAL SINUS SURGERY (FESS) Bilateral 10/9/2020    Procedure: ENDOSCOPIC FUNCTIONAL SINUS SURGERY W TRACKING, BILATERAL MAXILLARY AND LEFT SPHENOID SINUPLASTY,  RESECT INFERIOR TURBINATES VIA COBLATION;  Surgeon: Bairon Ramirez MD;  Location: [facility];  Service: ENT;  Laterality: Bilateral;   • GALLBLADDER SURGERY     • HYSTERECTOMY     • OTHER SURGICAL HISTORY      repaired infundibular & valvular PS: res. trivial PS and Mild to Mod. PI (age 8)   • TONSILLECTOMY AND ADENOIDECTOMY       Family History   Problem Relation Age of Onset   • Breast cancer Mother    • Heart disease Father    • Hypertension  Father    • Diabetes Father      Social History     Tobacco Use   • Smoking status: Former Smoker     Types: Cigarettes   • Smokeless tobacco: Never Used   • Tobacco comment: quit 7 years ago   Vaping Use   • Vaping Use: Some days   • Substances: CBD   Substance Use Topics   • Alcohol use: No   • Drug use: No     Allergies:  Penicillin g, Penicillins, Viibryd [vilazodone hcl], Amoxil [amoxicillin], Biaxin [clarithromycin], Cefzil [cefprozil], Clindamycin/lincomycin, Doxycycline, Lisinopril, Sulfa antibiotics, Sulfasalazine, Vancomycin, and Zofran [ondansetron hcl]    Current Outpatient Medications:   •  ALPRAZolam (XANAX) 1 MG tablet, Take 1 mg by mouth 3 (Three) Times a Day As Needed for Anxiety., Disp: , Rfl:   •  amLODIPine (NORVASC) 5 MG tablet, TAKE ONE TABLET BY MOUTH EVERY DAY (Patient taking differently: Take 5 mg by mouth Daily.), Disp: 30 tablet, Rfl: 5  •  azelastine (ASTELIN) 0.1 % nasal spray, 2 sprays into the nostril(s) as directed by provider 2 (Two) Times a Day. Use in each nostril as directed, Disp: 30 mL, Rfl: 11  •  carvedilol (COREG) 6.25 MG tablet, Take 6.25 mg by mouth 2 (Two) Times a Day With Meals., Disp: , Rfl:   •  cetirizine (zyrTEC) 10 MG tablet, Take 10 mg by mouth Daily., Disp: , Rfl:   •  diazePAM (VALIUM) 5 MG tablet, , Disp: , Rfl:   •  diclofenac (VOLTAREN) 75 MG EC tablet, Take 75 mg by mouth 2 (Two) Times a Day., Disp: , Rfl:   •  escitalopram (LEXAPRO) 20 MG tablet, Take 20 mg by mouth Daily., Disp: , Rfl:   •  fenofibrate (TRICOR) 54 MG tablet, Take 1 tablet by mouth Daily., Disp: , Rfl:   •  FIBER COMPLETE PO, Take 1 dose by mouth Daily As Needed (for constipation)., Disp: , Rfl:   •  fluticasone (FLONASE) 50 MCG/ACT nasal spray, 2 sprays into the nostril(s) as directed by provider Daily., Disp: 16 g, Rfl: 11  •  galcanezumab-gnlm (EMGALITY) 120 MG/ML prefilled syringe, Inject 1 mL under the skin into the appropriate area as directed Every 30 (Thirty) Days., Disp: 1 pen, Rfl:  "12  •  guaiFENesin (MUCINEX) 600 MG 12 hr tablet, Take 2 tablets by mouth Every 12 (Twelve) Hours for 30 days., Disp: 60 tablet, Rfl: 3  •  hydrOXYzine (ATARAX) 50 MG tablet, Take 50 mg by mouth Every 4 (Four) Hours As Needed., Disp: , Rfl:   •  losartan (COZAAR) 100 MG tablet, Take 100 mg by mouth Daily., Disp: , Rfl:   •  memantine (NAMENDA) 10 MG tablet, TAKE ONE TABLET BY MOUTH TWICE A DAY, Disp: 60 tablet, Rfl: 5  •  montelukast (SINGULAIR) 10 MG tablet, Take 10 mg by mouth Every Night., Disp: , Rfl:   •  pantoprazole (PROTONIX) 40 MG EC tablet, Take 40 mg by mouth Daily., Disp: , Rfl:   •  rizatriptan MLT (MAXALT-MLT) 5 MG disintegrating tablet, DISSOLVE 1 TABLET UNDER TONGUE FOR HEADACHE ...MAY REPEAT IN 2 HRS IF NEEDED..NO MORE THAN 2/24HRS!, Disp: 9 tablet, Rfl: 1  •  tiZANidine (ZANAFLEX) 2 MG tablet, Take 2 mg by mouth Every Night., Disp: , Rfl:   •  Trokendi  MG capsule sustained-release 24 hr, TAKE ONE CAPSULE BY MOUTH EVERY DAY, Disp: 30 capsule, Rfl: 5  •  ubrogepant (ubrogepant) 50 MG tablet, Take 1 tablet by mouth As Needed (Migraine). May repeat in 2 hours if needed, max 2 tabs in 24 hours., Disp: 10 tablet, Rfl: 5  •  vitamin D (ERGOCALCIFEROL) 1.25 MG (40030 UT) capsule capsule, , Disp: , Rfl:   •  zolpidem (AMBIEN) 10 MG tablet, Take 10 mg by mouth At Night As Needed for Sleep., Disp: , Rfl:       Objective     Vital Signs:    /83   Pulse 68   Temp 97 °F (36.1 °C) (Temporal)   Ht 157.5 cm (62\")   Wt 95.2 kg (209 lb 12.8 oz)   BMI 38.37 kg/m²     Physical Exam:  Physical Exam  Vitals reviewed.   Constitutional:       General: She is not in acute distress.     Appearance: She is well-developed.   HENT:      Head: Normocephalic and atraumatic.      Right Ear: External ear normal.      Left Ear: External ear normal.      Nose: Mucosal edema and congestion present. No septal deviation.      Right Turbinates: Not enlarged.      Left Turbinates: Not enlarged.      Comments: Mild " crusting noted to the right nasal cavity     Mouth/Throat:      Lips: Pink.   Eyes:      General: Lids are normal.   Pulmonary:      Effort: Pulmonary effort is normal. No respiratory distress.      Breath sounds: No stridor.   Musculoskeletal:      Cervical back: Neck supple.   Neurological:      Mental Status: She is alert and oriented to person, place, and time.   Psychiatric:         Mood and Affect: Mood normal.         Speech: Speech normal.         Behavior: Behavior normal. Behavior is cooperative.         Assessment   Assessment:  1. Chronic rhinitis    2. Postnasal drip    3. S/P sinus surgery        Plan   Plan:    Continue current medication regimen.  Start nasal saline spray and Mucinex  Increase water/ non caffeinated drink intake to at least 6-8 glasses a day.   She was instructed to call return for any problems or worsening symptoms.    New Medications Ordered This Visit   Medications   • guaiFENesin (MUCINEX) 600 MG 12 hr tablet     Sig: Take 2 tablets by mouth Every 12 (Twelve) Hours for 30 days.     Dispense:  60 tablet     Refill:  3     There are no Patient Instructions on file for this visit.  Return in about 6 months (around 12/21/2021), or if symptoms worsen or fail to improve, for Recheck.    My findings and recommendations were discussed and questions were answered.     Ivory Rodrigez, APRN

## 2021-07-27 DIAGNOSIS — G43.109 MIGRAINE WITH AURA AND WITHOUT STATUS MIGRAINOSUS, NOT INTRACTABLE: ICD-10-CM

## 2021-08-30 DIAGNOSIS — G43.119 INTRACTABLE MIGRAINE WITH AURA WITHOUT STATUS MIGRAINOSUS: ICD-10-CM

## 2021-08-30 RX ORDER — AMLODIPINE BESYLATE 5 MG/1
TABLET ORAL
Qty: 30 TABLET | Refills: 2 | Status: SHIPPED | OUTPATIENT
Start: 2021-08-30 | End: 2022-02-25

## 2021-09-20 DIAGNOSIS — G43.109 MIGRAINE WITH AURA AND WITHOUT STATUS MIGRAINOSUS, NOT INTRACTABLE: ICD-10-CM

## 2021-10-12 DIAGNOSIS — G43.109 MIGRAINE WITH AURA AND WITHOUT STATUS MIGRAINOSUS, NOT INTRACTABLE: ICD-10-CM

## 2021-10-13 RX ORDER — TOPIRAMATE 200 MG/1
CAPSULE, EXTENDED RELEASE ORAL
Qty: 30 CAPSULE | Refills: 1 | Status: SHIPPED | OUTPATIENT
Start: 2021-10-13 | End: 2022-02-11

## 2021-10-15 DIAGNOSIS — G43.109 MIGRAINE WITH AURA AND WITHOUT STATUS MIGRAINOSUS, NOT INTRACTABLE: ICD-10-CM

## 2021-10-15 RX ORDER — TOPIRAMATE 200 MG/1
CAPSULE, EXTENDED RELEASE ORAL
Qty: 30 CAPSULE | Refills: 1 | OUTPATIENT
Start: 2021-10-15

## 2021-11-22 DIAGNOSIS — G43.109 MIGRAINE WITH AURA AND WITHOUT STATUS MIGRAINOSUS, NOT INTRACTABLE: ICD-10-CM

## 2021-11-22 RX ORDER — UBROGEPANT 50 MG/1
TABLET ORAL
Qty: 10 TABLET | Refills: 0 | Status: SHIPPED | OUTPATIENT
Start: 2021-11-22 | End: 2022-02-25 | Stop reason: SDUPTHER

## 2021-12-02 DIAGNOSIS — G43.109 MIGRAINE WITH AURA AND WITHOUT STATUS MIGRAINOSUS, NOT INTRACTABLE: ICD-10-CM

## 2021-12-03 RX ORDER — ASCORBIC ACID 500 MG
60 TABLET ORAL DAILY
COMMUNITY
End: 2022-05-18

## 2021-12-03 RX ORDER — MEMANTINE HYDROCHLORIDE 10 MG/1
TABLET ORAL
Qty: 60 TABLET | Refills: 0 | Status: SHIPPED | OUTPATIENT
Start: 2021-12-03 | End: 2022-02-08

## 2021-12-06 ENCOUNTER — OFFICE VISIT (OUTPATIENT)
Dept: NEUROLOGY | Facility: CLINIC | Age: 46
End: 2021-12-06

## 2021-12-06 VITALS
SYSTOLIC BLOOD PRESSURE: 118 MMHG | OXYGEN SATURATION: 99 % | HEIGHT: 62 IN | HEART RATE: 65 BPM | DIASTOLIC BLOOD PRESSURE: 74 MMHG | WEIGHT: 212 LBS | BODY MASS INDEX: 39.01 KG/M2

## 2021-12-06 DIAGNOSIS — Q85.00 NEUROFIBROMATOSIS (HCC): Primary | ICD-10-CM

## 2021-12-06 DIAGNOSIS — G43.109 MIGRAINE WITH AURA AND WITHOUT STATUS MIGRAINOSUS, NOT INTRACTABLE: ICD-10-CM

## 2021-12-06 DIAGNOSIS — C72.30 LOW-GRADE OPTIC PATHWAY GLIOMA (HCC): ICD-10-CM

## 2021-12-06 DIAGNOSIS — G43.119 INTRACTABLE MIGRAINE WITH AURA WITHOUT STATUS MIGRAINOSUS: ICD-10-CM

## 2021-12-06 DIAGNOSIS — R51.9 CHRONIC DAILY HEADACHE: ICD-10-CM

## 2021-12-06 PROCEDURE — 99214 OFFICE O/P EST MOD 30 MIN: CPT | Performed by: PHYSICIAN ASSISTANT

## 2021-12-06 RX ORDER — RIZATRIPTAN BENZOATE 5 MG/1
5 TABLET, ORALLY DISINTEGRATING ORAL ONCE AS NEEDED
Qty: 12 TABLET | Refills: 11 | Status: SHIPPED | OUTPATIENT
Start: 2021-12-06 | End: 2022-12-18 | Stop reason: SDUPTHER

## 2021-12-06 NOTE — PROGRESS NOTES
Subjective   Sandy Estes is a 46 y.o. female presents for follow-up of neurofibromatosis, optic pathway glioma and migraine headaches.     History of Present Illness     Migraine headaches  She continues to have intermittent migraine headaches. She reports having the headaches about 2 to 3 times a week. With her regular migraines, the Ubrelvy helps relieve her pain. She notes with her severe migraines, the Ubrelvy helps dull the headaches then she takes Tylenol as well with some relief.     Diplopia  She reports having some double vision recently which was not associated with her headaches. While driving, she has an episode where she was having difficulty seeing whether she was on the road or not. Her diplopia seemed to improve after she squinted. She has noticed the diplopia even with reading. This is new for her.     The following portions of the patient's history were reviewed and updated as appropriate: allergies, current medications, past family history, past medical history, past social history, past surgical history and problem list.    Review of Systems:  A review of systems was performed, and positive findings are noted in the HPI.      Current Outpatient Medications:   •  ALPRAZolam (XANAX) 1 MG tablet, Take 1 mg by mouth 3 (Three) Times a Day As Needed for Anxiety., Disp: , Rfl:   •  amLODIPine (NORVASC) 5 MG tablet, TAKE ONE TABLET DAILY (Patient taking differently: Take 5 mg by mouth Daily.), Disp: 30 tablet, Rfl: 2  •  ascorbic acid (VITAMIN C) 500 MG tablet, Take 60 mg by mouth Daily., Disp: , Rfl:   •  azelastine (ASTELIN) 0.1 % nasal spray, 2 sprays into the nostril(s) as directed by provider 2 (Two) Times a Day. Use in each nostril as directed, Disp: 30 mL, Rfl: 11  •  carvedilol (COREG) 6.25 MG tablet, Take 6.25 mg by mouth 2 (Two) Times a Day With Meals., Disp: , Rfl:   •  cetirizine (zyrTEC) 10 MG tablet, Take 10 mg by mouth Daily., Disp: , Rfl:   •  diazePAM (VALIUM) 5 MG tablet, Take 5 mg  by mouth Daily., Disp: , Rfl:   •  diclofenac (VOLTAREN) 75 MG EC tablet, Take 75 mg by mouth 2 (Two) Times a Day., Disp: , Rfl:   •  escitalopram (LEXAPRO) 20 MG tablet, Take 20 mg by mouth Daily., Disp: , Rfl:   •  fenofibrate (TRICOR) 54 MG tablet, Take 1 tablet by mouth Daily., Disp: , Rfl:   •  FIBER COMPLETE PO, Take 1 dose by mouth Daily As Needed (for constipation)., Disp: , Rfl:   •  fluticasone (FLONASE) 50 MCG/ACT nasal spray, 2 sprays into the nostril(s) as directed by provider Daily., Disp: 16 g, Rfl: 11  •  galcanezumab-gnlm (EMGALITY) 120 MG/ML prefilled syringe, Inject 1 mL under the skin into the appropriate area as directed Every 30 (Thirty) Days., Disp: 1 pen, Rfl: 12  •  hydrOXYzine (ATARAX) 50 MG tablet, Take 50 mg by mouth Every 4 (Four) Hours As Needed., Disp: , Rfl:   •  losartan (COZAAR) 100 MG tablet, Take 100 mg by mouth Daily., Disp: , Rfl:   •  memantine (NAMENDA) 10 MG tablet, TAKE ONE TABLET BY MOUTH TWICE A DAY (Patient taking differently: Take 10 mg by mouth 2 (Two) Times a Day.), Disp: 60 tablet, Rfl: 0  •  montelukast (SINGULAIR) 10 MG tablet, Take 10 mg by mouth Every Night., Disp: , Rfl:   •  pantoprazole (PROTONIX) 40 MG EC tablet, Take 40 mg by mouth Daily., Disp: , Rfl:   •  rizatriptan MLT (MAXALT-MLT) 5 MG disintegrating tablet, Place 1 tablet on the tongue 1 (One) Time As Needed for Migraine. May repeat in 2 hours if needed, Disp: 12 tablet, Rfl: 11  •  tiZANidine (ZANAFLEX) 2 MG tablet, Take 2 mg by mouth Every Night., Disp: , Rfl:   •  Trokendi  MG capsule sustained-release 24 hr, TAKE ONE CAPSULE BY MOUTH EVERY DAY (Patient taking differently: Take 200 mg by mouth Daily.), Disp: 30 capsule, Rfl: 1  •  ubrogepant tablet, TAKE 1 TABLET BY MOUTH AS NEEDED (MIGRAINE). MAY REPEAT IN 2 HOURS IF NEEDED, MAX 2 TABS IN 24 HOURS. (Patient taking differently: Take 50 mg by mouth 1 (One) Time As Needed.), Disp: 10 tablet, Rfl: 0  •  vitamin D (ERGOCALCIFEROL) 1.25 MG (33002  UT) capsule capsule, Take 50,000 Units by mouth Every 7 (Seven) Days., Disp: , Rfl:   •  zolpidem (AMBIEN) 10 MG tablet, Take 10 mg by mouth At Night As Needed for Sleep., Disp: , Rfl:      Objective   Physical Exam  Vitals and nursing note reviewed.   HENT:      Head: Normocephalic.      Right Ear: Hearing and external ear normal.      Left Ear: Hearing and external ear normal.      Nose: Nose normal.      Mouth/Throat:      Pharynx: Oropharynx is clear.   Eyes:      General: Lids are normal. Vision grossly intact. Gaze aligned appropriately. No scleral icterus.     Extraocular Movements: Extraocular movements intact.      Conjunctiva/sclera: Conjunctivae normal.      Pupils: Pupils are equal, round, and reactive to light.      Visual Fields: Right eye visual fields normal and left eye visual fields normal.   Neck:      Vascular: No carotid bruit or JVD.      Trachea: Trachea and phonation normal.   Cardiovascular:      Rate and Rhythm: Normal rate.      Heart sounds: Normal heart sounds.   Pulmonary:      Effort: Pulmonary effort is normal.      Breath sounds: Normal breath sounds.   Musculoskeletal:      Cervical back: Normal range of motion.   Skin:     General: Skin is warm and dry.   Neurological:      Mental Status: She is alert and oriented to person, place, and time.      GCS: GCS eye subscore is 4. GCS verbal subscore is 5. GCS motor subscore is 6.      Cranial Nerves: Cranial nerves are intact.      Sensory: Sensation is intact.      Motor: Motor function is intact.      Coordination: Coordination is intact.      Gait: Gait is intact.      Deep Tendon Reflexes: Reflexes are normal and symmetric.   Psychiatric:         Attention and Perception: Attention and perception normal.         Mood and Affect: Mood and affect normal.         Speech: Speech normal.         Behavior: Behavior normal.         Thought Content: Thought content normal.         Cognition and Memory: Cognition and memory normal.          Judgment: Judgment normal.           Assessment/Plan   Diagnoses and all orders for this visit:    1. Neurofibromatosis (HCC) (Primary)  - We will do a follow-up MRI in the beginning of the year for monitoring.   -     MRI Brain With & Without Contrast; Future    2. Low-grade optic pathway glioma (HCC)  -     MRI Brain With & Without Contrast; Future    3. Migraine with aura and without status migrainosus, not intractable  - She is due for an eye exam. She follows with Dr. Simpson.  -     MRI Brain With & Without Contrast; Future    4. Chronic daily headache    5. Intractable migraine with aura without status migrainosus  - Continue on the same medications for her migraines. Refilled the Maxalt as requested.   -     rizatriptan MLT (MAXALT-MLT) 5 MG disintegrating tablet; Place 1 tablet on the tongue 1 (One) Time As Needed for Migraine. May repeat in 2 hours if needed  Dispense: 12 tablet; Refill: 11           Transcribed from ambient dictation for DEE DEE Billings by Cathryn OTTO Rep.  12/06/21   15:11 CST    Patient verbalized consent to the visit recording.

## 2021-12-20 ENCOUNTER — HOSPITAL ENCOUNTER (OUTPATIENT)
Dept: MRI IMAGING | Facility: HOSPITAL | Age: 46
Discharge: HOME | End: 2021-12-20
Admitting: PHYSICIAN ASSISTANT

## 2021-12-20 DIAGNOSIS — Q85.00 NEUROFIBROMATOSIS (HCC): ICD-10-CM

## 2021-12-20 DIAGNOSIS — C72.30 LOW-GRADE OPTIC PATHWAY GLIOMA (HCC): ICD-10-CM

## 2021-12-20 DIAGNOSIS — G43.109 MIGRAINE WITH AURA AND WITHOUT STATUS MIGRAINOSUS, NOT INTRACTABLE: ICD-10-CM

## 2021-12-20 LAB — CREAT BLDA-MCNC: 1 MG/DL (ref 0.6–1.3)

## 2021-12-20 PROCEDURE — 70553 MRI BRAIN STEM W/O & W/DYE: CPT

## 2021-12-20 PROCEDURE — 0 GADOBENATE DIMEGLUMINE 529 MG/ML SOLUTION: Performed by: PHYSICIAN ASSISTANT

## 2021-12-20 PROCEDURE — 82565 ASSAY OF CREATININE: CPT

## 2021-12-20 PROCEDURE — A9577 INJ MULTIHANCE: HCPCS | Performed by: PHYSICIAN ASSISTANT

## 2021-12-20 RX ADMIN — GADOBENATE DIMEGLUMINE 20 ML: 529 INJECTION, SOLUTION INTRAVENOUS at 17:37

## 2021-12-22 ENCOUNTER — TELEPHONE (OUTPATIENT)
Dept: NEUROLOGY | Facility: CLINIC | Age: 46
End: 2021-12-22

## 2021-12-22 NOTE — TELEPHONE ENCOUNTER
----- Message from DEE DEE Billings sent at 12/22/2021 10:22 AM CST -----  Her MRI (Optic Glioma) is stable, unchanged, good

## 2022-02-08 DIAGNOSIS — G43.109 MIGRAINE WITH AURA AND WITHOUT STATUS MIGRAINOSUS, NOT INTRACTABLE: ICD-10-CM

## 2022-02-08 RX ORDER — MEMANTINE HYDROCHLORIDE 10 MG/1
TABLET ORAL
Qty: 60 TABLET | Refills: 5 | Status: SHIPPED | OUTPATIENT
Start: 2022-02-08 | End: 2022-08-01

## 2022-02-11 DIAGNOSIS — G43.109 MIGRAINE WITH AURA AND WITHOUT STATUS MIGRAINOSUS, NOT INTRACTABLE: ICD-10-CM

## 2022-02-11 RX ORDER — TOPIRAMATE 200 MG/1
CAPSULE, EXTENDED RELEASE ORAL
Qty: 30 CAPSULE | Refills: 5 | Status: SHIPPED | OUTPATIENT
Start: 2022-02-11 | End: 2022-08-02

## 2022-02-23 DIAGNOSIS — G43.119 INTRACTABLE MIGRAINE WITH AURA WITHOUT STATUS MIGRAINOSUS: ICD-10-CM

## 2022-02-23 NOTE — TELEPHONE ENCOUNTER
Message left requesting a return call.  [facility] said Amlodipine was filled on 10-21, 11-18, and 1-18.

## 2022-02-25 DIAGNOSIS — G43.109 MIGRAINE WITH AURA AND WITHOUT STATUS MIGRAINOSUS, NOT INTRACTABLE: ICD-10-CM

## 2022-02-25 RX ORDER — AMLODIPINE BESYLATE 5 MG/1
TABLET ORAL
Qty: 30 TABLET | Refills: 0 | Status: SHIPPED | OUTPATIENT
Start: 2022-02-25 | End: 2022-04-21

## 2022-03-31 DIAGNOSIS — G43.109 MIGRAINE WITH AURA AND WITHOUT STATUS MIGRAINOSUS, NOT INTRACTABLE: ICD-10-CM

## 2022-03-31 RX ORDER — GALCANEZUMAB 120 MG/ML
120 INJECTION, SOLUTION SUBCUTANEOUS
Qty: 1 PEN | Refills: 2 | Status: SHIPPED | OUTPATIENT
Start: 2022-03-31 | End: 2022-06-29

## 2022-04-21 DIAGNOSIS — G43.119 INTRACTABLE MIGRAINE WITH AURA WITHOUT STATUS MIGRAINOSUS: ICD-10-CM

## 2022-04-21 RX ORDER — AMLODIPINE BESYLATE 5 MG/1
TABLET ORAL
Qty: 30 TABLET | Refills: 0 | Status: SHIPPED | OUTPATIENT
Start: 2022-04-21 | End: 2022-08-22

## 2022-05-18 ENCOUNTER — OFFICE VISIT (OUTPATIENT)
Dept: CARDIOLOGY | Facility: CLINIC | Age: 47
End: 2022-05-18

## 2022-05-18 VITALS
BODY MASS INDEX: 40.85 KG/M2 | HEART RATE: 62 BPM | HEIGHT: 62 IN | DIASTOLIC BLOOD PRESSURE: 68 MMHG | WEIGHT: 222 LBS | SYSTOLIC BLOOD PRESSURE: 100 MMHG

## 2022-05-18 DIAGNOSIS — Q24.9 ADULT CONGENITAL HEART DISEASE: Primary | ICD-10-CM

## 2022-05-18 RX ORDER — HYDROXYZINE PAMOATE 25 MG/1
25 CAPSULE ORAL 2 TIMES DAILY
COMMUNITY
End: 2023-01-09

## 2022-05-18 RX ORDER — FUROSEMIDE 20 MG/1
20 TABLET ORAL DAILY
COMMUNITY
End: 2023-01-09

## 2022-05-18 RX ORDER — METHOCARBAMOL 500 MG/1
500 TABLET, FILM COATED ORAL 3 TIMES DAILY
COMMUNITY

## 2022-05-18 RX ORDER — BUDESONIDE, GLYCOPYRROLATE, AND FORMOTEROL FUMARATE 160; 9; 4.8 UG/1; UG/1; UG/1
2 AEROSOL, METERED RESPIRATORY (INHALATION) 2 TIMES DAILY
COMMUNITY

## 2022-05-18 NOTE — PROGRESS NOTES
Subjective    Sandy Estes is a 47 y.o. female. Referred by pcp for possible chf    History of Present Illness     ADULT CONGENITAL HEART DISEASE - this is outside my specialty - prior to COVID she was seeing cardiologists in [facility] and she needs to re-establish care with them        The following portions of the patient's history were reviewed and updated as appropriate: allergies, current medications, past family history, past medical history, past social history, past surgical history and problem list.    Patient Active Problem List   Diagnosis   • Anxiety   • Depression   • Neurofibromatosis (HCC)   • Hypertension   • Hyperlipidemia   • Chronic back pain   • Chronic daily headache   • Migraine with aura and without status migrainosus, not intractable   • Low-grade optic pathway glioma (HCC)   • BMI 38.0-38.9,adult   • Allergic rhinitis with postnasal drip   • S/P sinus surgery       Allergies   Allergen Reactions   • Penicillin G Shortness Of Breath     Other reaction(s): Unknown   • Penicillins Shortness Of Breath   • Viibryd [Vilazodone Hcl] Nausea And Vomiting and Other (See Comments)     fatigue   • Amoxil [Amoxicillin] Hives   • Biaxin [Clarithromycin] Hives   • Cefzil [Cefprozil] Hives   • Clindamycin/Lincomycin Hives   • Doxycycline Hives   • Lisinopril Other (See Comments)     Itchy throat, clearing throat a lot with this med   • Sulfa Antibiotics Hives   • Sulfasalazine Hives   • Vancomycin Hives   • Zofran [Ondansetron Hcl] Nausea And Vomiting       Family History   Problem Relation Age of Onset   • Breast cancer Mother    • Heart disease Father    • Hypertension Father    • Diabetes Father        Social History     Socioeconomic History   • Marital status: Single   Tobacco Use   • Smoking status: Former Smoker     Types: Cigarettes     Quit date:      Years since quittin.3   • Smokeless tobacco: Never Used   Vaping Use   • Vaping Use: Some days   • Substances: CBD   Substance and Sexual  Activity   • Alcohol use: No   • Drug use: No   • Sexual activity: Defer         Current Outpatient Medications:   •  ALPRAZolam (XANAX) 1 MG tablet, Take 1 mg by mouth 3 (Three) Times a Day As Needed for Anxiety., Disp: , Rfl:   •  amLODIPine (NORVASC) 5 MG tablet, TAKE ONE TABLET BY MOUTH EVERY DAY, Disp: 30 tablet, Rfl: 0  •  ascorbic acid (VITAMIN C) 500 MG tablet, Take 60 mg by mouth Daily., Disp: , Rfl:   •  azelastine (ASTELIN) 0.1 % nasal spray, 2 sprays into the nostril(s) as directed by provider 2 (Two) Times a Day. Use in each nostril as directed, Disp: 30 mL, Rfl: 11  •  carvedilol (COREG) 6.25 MG tablet, Take 6.25 mg by mouth 2 (Two) Times a Day With Meals., Disp: , Rfl:   •  cetirizine (zyrTEC) 10 MG tablet, Take 10 mg by mouth Daily., Disp: , Rfl:   •  diazePAM (VALIUM) 5 MG tablet, Take 5 mg by mouth Daily., Disp: , Rfl:   •  diclofenac (VOLTAREN) 75 MG EC tablet, Take 75 mg by mouth 2 (Two) Times a Day., Disp: , Rfl:   •  escitalopram (LEXAPRO) 20 MG tablet, Take 20 mg by mouth Daily., Disp: , Rfl:   •  fenofibrate (TRICOR) 54 MG tablet, Take 1 tablet by mouth Daily., Disp: , Rfl:   •  FIBER COMPLETE PO, Take 1 dose by mouth Daily As Needed (for constipation)., Disp: , Rfl:   •  fluticasone (FLONASE) 50 MCG/ACT nasal spray, 2 sprays into the nostril(s) as directed by provider Daily., Disp: 16 g, Rfl: 11  •  galcanezumab-gnlm (Emgality) 120 MG/ML auto-injector pen, Inject 1 mL under the skin into the appropriate area as directed Every 30 (Thirty) Days., Disp: 1 pen, Rfl: 2  •  hydrOXYzine (ATARAX) 50 MG tablet, Take 50 mg by mouth Every 4 (Four) Hours As Needed., Disp: , Rfl:   •  losartan (COZAAR) 100 MG tablet, Take 100 mg by mouth Daily., Disp: , Rfl:   •  memantine (NAMENDA) 10 MG tablet, TAKE ONE TABLET BY MOUTH TWICE A DAY, Disp: 60 tablet, Rfl: 5  •  montelukast (SINGULAIR) 10 MG tablet, Take 10 mg by mouth Every Night., Disp: , Rfl:   •  pantoprazole (PROTONIX) 40 MG EC tablet, Take 40 mg by  "mouth Daily., Disp: , Rfl:   •  rizatriptan MLT (MAXALT-MLT) 5 MG disintegrating tablet, Place 1 tablet on the tongue 1 (One) Time As Needed for Migraine. May repeat in 2 hours if needed, Disp: 12 tablet, Rfl: 11  •  tiZANidine (ZANAFLEX) 2 MG tablet, Take 2 mg by mouth Every Night., Disp: , Rfl:   •  Trokendi  MG capsule sustained-release 24 hr, TAKE ONE CAPSULE BY MOUTH EVERY DAY, Disp: 30 capsule, Rfl: 5  •  ubrogepant (ubrogepant) 50 MG tablet, Take 1 tablet at onset of migraine, may repeat in 2 hours if needed.  Not to exceed 2 doses in 24 hours., Disp: 10 tablet, Rfl: 5  •  vitamin D (ERGOCALCIFEROL) 1.25 MG (33014 UT) capsule capsule, Take 50,000 Units by mouth Every 7 (Seven) Days., Disp: , Rfl:   •  zolpidem (AMBIEN) 10 MG tablet, Take 10 mg by mouth At Night As Needed for Sleep., Disp: , Rfl:     Past Surgical History:   Procedure Laterality Date   • CARDIAC SURGERY     • ENDOSCOPIC FUNCTIONAL SINUS SURGERY (FESS) Bilateral 10/9/2020    Procedure: ENDOSCOPIC FUNCTIONAL SINUS SURGERY W TRACKING, BILATERAL MAXILLARY AND LEFT SPHENOID SINUPLASTY,  RESECT INFERIOR TURBINATES VIA COBLATION;  Surgeon: Bairon Ramirez MD;  Location: [facility];  Service: ENT;  Laterality: Bilateral;   • GALLBLADDER SURGERY     • HYSTERECTOMY     • OTHER SURGICAL HISTORY      repaired infundibular & valvular PS: res. trivial PS and Mild to Mod. PI (age 8)   • TONSILLECTOMY AND ADENOIDECTOMY         Review of Systems    Ht 157.5 cm (62\")   Wt 101 kg (222 lb)   BMI 40.60 kg/m²   Procedures    Objective   Physical Exam    Assessment & Plan   There are no diagnoses linked to this encounter.             No follow-ups on file.  No orders of the defined types were placed in this encounter.    "

## 2022-05-31 ENCOUNTER — HOSPITAL ENCOUNTER (EMERGENCY)
Facility: HOSPITAL | Age: 47
Discharge: HOME | End: 2022-05-31
Admitting: EMERGENCY MEDICINE

## 2022-05-31 ENCOUNTER — TELEPHONE (OUTPATIENT)
Dept: NEUROLOGY | Facility: CLINIC | Age: 47
End: 2022-05-31

## 2022-05-31 ENCOUNTER — APPOINTMENT (OUTPATIENT)
Dept: GENERAL RADIOLOGY | Facility: HOSPITAL | Age: 47
End: 2022-05-31

## 2022-05-31 ENCOUNTER — APPOINTMENT (OUTPATIENT)
Dept: CT IMAGING | Facility: HOSPITAL | Age: 47
End: 2022-05-31

## 2022-05-31 VITALS
RESPIRATION RATE: 18 BRPM | HEART RATE: 56 BPM | SYSTOLIC BLOOD PRESSURE: 124 MMHG | HEIGHT: 62 IN | TEMPERATURE: 97.8 F | WEIGHT: 220 LBS | OXYGEN SATURATION: 95 % | DIASTOLIC BLOOD PRESSURE: 68 MMHG | BODY MASS INDEX: 40.48 KG/M2

## 2022-05-31 DIAGNOSIS — R26.89 BALANCE PROBLEM: ICD-10-CM

## 2022-05-31 DIAGNOSIS — C72.30: ICD-10-CM

## 2022-05-31 DIAGNOSIS — Q85.00 NEUROFIBROMATOSIS: ICD-10-CM

## 2022-05-31 DIAGNOSIS — R47.81 SLURRED SPEECH: Primary | ICD-10-CM

## 2022-05-31 LAB
ALBUMIN SERPL-MCNC: 4.1 G/DL (ref 3.5–5.2)
ALBUMIN/GLOB SERPL: 1.3 G/DL
ALP SERPL-CCNC: 98 U/L (ref 39–117)
ALT SERPL W P-5'-P-CCNC: 15 U/L (ref 1–33)
ANION GAP SERPL CALCULATED.3IONS-SCNC: 11 MMOL/L (ref 5–15)
AST SERPL-CCNC: 13 U/L (ref 1–32)
BACTERIA UR QL AUTO: ABNORMAL /HPF
BASOPHILS # BLD AUTO: 0.05 10*3/MM3 (ref 0–0.2)
BASOPHILS NFR BLD AUTO: 0.6 % (ref 0–1.5)
BILIRUB SERPL-MCNC: 0.3 MG/DL (ref 0–1.2)
BILIRUB UR QL STRIP: NEGATIVE
BUN SERPL-MCNC: 24 MG/DL (ref 6–20)
BUN/CREAT SERPL: 20.3 (ref 7–25)
CALCIUM SPEC-SCNC: 9 MG/DL (ref 8.6–10.5)
CHLORIDE SERPL-SCNC: 109 MMOL/L (ref 98–107)
CLARITY UR: CLEAR
CO2 SERPL-SCNC: 23 MMOL/L (ref 22–29)
COLOR UR: YELLOW
CREAT SERPL-MCNC: 1.18 MG/DL (ref 0.57–1)
D-LACTATE SERPL-SCNC: 0.6 MMOL/L (ref 0.5–2)
DEPRECATED RDW RBC AUTO: 47.4 FL (ref 37–54)
EGFRCR SERPLBLD CKD-EPI 2021: 58.2 ML/MIN/1.73
EOSINOPHIL # BLD AUTO: 0.32 10*3/MM3 (ref 0–0.4)
EOSINOPHIL NFR BLD AUTO: 3.9 % (ref 0.3–6.2)
ERYTHROCYTE [DISTWIDTH] IN BLOOD BY AUTOMATED COUNT: 14.6 % (ref 12.3–15.4)
GLOBULIN UR ELPH-MCNC: 3.1 GM/DL
GLUCOSE SERPL-MCNC: 86 MG/DL (ref 65–99)
GLUCOSE UR STRIP-MCNC: NEGATIVE MG/DL
HCT VFR BLD AUTO: 37.5 % (ref 34–46.6)
HGB BLD-MCNC: 11.5 G/DL (ref 12–15.9)
HGB UR QL STRIP.AUTO: NEGATIVE
HYALINE CASTS UR QL AUTO: ABNORMAL /LPF
IMM GRANULOCYTES # BLD AUTO: 0.02 10*3/MM3 (ref 0–0.05)
IMM GRANULOCYTES NFR BLD AUTO: 0.2 % (ref 0–0.5)
INR PPP: 1.22 (ref 0.91–1.09)
KETONES UR QL STRIP: ABNORMAL
LEUKOCYTE ESTERASE UR QL STRIP.AUTO: ABNORMAL
LIPASE SERPL-CCNC: 23 U/L (ref 13–60)
LYMPHOCYTES # BLD AUTO: 1.8 10*3/MM3 (ref 0.7–3.1)
LYMPHOCYTES NFR BLD AUTO: 21.9 % (ref 19.6–45.3)
MCH RBC QN AUTO: 27.3 PG (ref 26.6–33)
MCHC RBC AUTO-ENTMCNC: 30.7 G/DL (ref 31.5–35.7)
MCV RBC AUTO: 88.9 FL (ref 79–97)
MONOCYTES # BLD AUTO: 0.63 10*3/MM3 (ref 0.1–0.9)
MONOCYTES NFR BLD AUTO: 7.7 % (ref 5–12)
NEUTROPHILS NFR BLD AUTO: 5.41 10*3/MM3 (ref 1.7–7)
NEUTROPHILS NFR BLD AUTO: 65.7 % (ref 42.7–76)
NITRITE UR QL STRIP: NEGATIVE
NRBC BLD AUTO-RTO: 0 /100 WBC (ref 0–0.2)
NT-PROBNP SERPL-MCNC: 621.6 PG/ML (ref 0–450)
PH UR STRIP.AUTO: 5.5 [PH] (ref 5–8)
PLATELET # BLD AUTO: 261 10*3/MM3 (ref 140–450)
PMV BLD AUTO: 11.9 FL (ref 6–12)
POTASSIUM SERPL-SCNC: 4.1 MMOL/L (ref 3.5–5.2)
PROCALCITONIN SERPL-MCNC: 0.05 NG/ML (ref 0–0.25)
PROT SERPL-MCNC: 7.2 G/DL (ref 6–8.5)
PROT UR QL STRIP: NEGATIVE
PROTHROMBIN TIME: 14.9 SECONDS (ref 11.9–14.6)
RBC # BLD AUTO: 4.22 10*6/MM3 (ref 3.77–5.28)
RBC # UR STRIP: ABNORMAL /HPF
REF LAB TEST METHOD: ABNORMAL
SARS-COV-2 RNA PNL SPEC NAA+PROBE: NOT DETECTED
SODIUM SERPL-SCNC: 143 MMOL/L (ref 136–145)
SP GR UR STRIP: >1.03 (ref 1–1.03)
SQUAMOUS #/AREA URNS HPF: ABNORMAL /HPF
TROPONIN T SERPL-MCNC: <0.01 NG/ML (ref 0–0.03)
UROBILINOGEN UR QL STRIP: ABNORMAL
WBC # UR STRIP: ABNORMAL /HPF
WBC NRBC COR # BLD: 8.23 10*3/MM3 (ref 3.4–10.8)

## 2022-05-31 PROCEDURE — 87040 BLOOD CULTURE FOR BACTERIA: CPT | Performed by: NURSE PRACTITIONER

## 2022-05-31 PROCEDURE — 83690 ASSAY OF LIPASE: CPT | Performed by: NURSE PRACTITIONER

## 2022-05-31 PROCEDURE — 70450 CT HEAD/BRAIN W/O DYE: CPT

## 2022-05-31 PROCEDURE — 83605 ASSAY OF LACTIC ACID: CPT | Performed by: NURSE PRACTITIONER

## 2022-05-31 PROCEDURE — 83880 ASSAY OF NATRIURETIC PEPTIDE: CPT | Performed by: NURSE PRACTITIONER

## 2022-05-31 PROCEDURE — 81001 URINALYSIS AUTO W/SCOPE: CPT | Performed by: NURSE PRACTITIONER

## 2022-05-31 PROCEDURE — 85610 PROTHROMBIN TIME: CPT | Performed by: NURSE PRACTITIONER

## 2022-05-31 PROCEDURE — 84484 ASSAY OF TROPONIN QUANT: CPT | Performed by: NURSE PRACTITIONER

## 2022-05-31 PROCEDURE — 71045 X-RAY EXAM CHEST 1 VIEW: CPT

## 2022-05-31 PROCEDURE — 93005 ELECTROCARDIOGRAM TRACING: CPT | Performed by: NURSE PRACTITIONER

## 2022-05-31 PROCEDURE — 99283 EMERGENCY DEPT VISIT LOW MDM: CPT

## 2022-05-31 PROCEDURE — 36415 COLL VENOUS BLD VENIPUNCTURE: CPT

## 2022-05-31 PROCEDURE — 87086 URINE CULTURE/COLONY COUNT: CPT | Performed by: NURSE PRACTITIONER

## 2022-05-31 PROCEDURE — 80053 COMPREHEN METABOLIC PANEL: CPT | Performed by: NURSE PRACTITIONER

## 2022-05-31 PROCEDURE — 84145 PROCALCITONIN (PCT): CPT | Performed by: NURSE PRACTITIONER

## 2022-05-31 PROCEDURE — 87635 SARS-COV-2 COVID-19 AMP PRB: CPT | Performed by: NURSE PRACTITIONER

## 2022-05-31 PROCEDURE — 85025 COMPLETE CBC W/AUTO DIFF WBC: CPT | Performed by: NURSE PRACTITIONER

## 2022-05-31 PROCEDURE — 93010 ELECTROCARDIOGRAM REPORT: CPT | Performed by: INTERNAL MEDICINE

## 2022-05-31 RX ORDER — SODIUM CHLORIDE 0.9 % (FLUSH) 0.9 %
10 SYRINGE (ML) INJECTION AS NEEDED
Status: DISCONTINUED | OUTPATIENT
Start: 2022-05-31 | End: 2022-05-31 | Stop reason: HOSPADM

## 2022-06-01 LAB — BACTERIA SPEC AEROBE CULT: NO GROWTH

## 2022-06-01 NOTE — TELEPHONE ENCOUNTER
PT CALLING BACK TO REPORT TO CARROLL THAT SHE IS DOING ABOUT THE SAME. .ALSO SHE MENTIONED THAT THE ED ORDERED A MRI BRAIN W/WO FOR HER TO GET DONE BEFORE SHE SEE'S SEAMUS ON 6-06-22

## 2022-06-01 NOTE — DISCHARGE INSTRUCTIONS
Drink plenty of fluid.  Continue home medication.  Change positions slowly.  Outpatient MRI of the brain with and without contrast. Scheduling will set this up for you.  Call them tomorrow and ensure this is completed before your visit with EDWARD Mathias on Monday.  Follow up with PCP - call tomorrow for appointment.  Keep cardiology appointment. Return to ED if condition does not improve or worsens

## 2022-06-03 ENCOUNTER — HOSPITAL ENCOUNTER (OUTPATIENT)
Dept: MRI IMAGING | Facility: HOSPITAL | Age: 47
Discharge: HOME | End: 2022-06-03
Admitting: NURSE PRACTITIONER

## 2022-06-03 DIAGNOSIS — R26.89 BALANCE PROBLEM: ICD-10-CM

## 2022-06-03 DIAGNOSIS — Q85.00 NEUROFIBROMATOSIS: ICD-10-CM

## 2022-06-03 DIAGNOSIS — C72.30: ICD-10-CM

## 2022-06-03 DIAGNOSIS — R47.81 SLURRED SPEECH: ICD-10-CM

## 2022-06-03 PROCEDURE — A9577 INJ MULTIHANCE: HCPCS | Performed by: NURSE PRACTITIONER

## 2022-06-03 PROCEDURE — 70553 MRI BRAIN STEM W/O & W/DYE: CPT

## 2022-06-03 PROCEDURE — 0 GADOBENATE DIMEGLUMINE 529 MG/ML SOLUTION: Performed by: NURSE PRACTITIONER

## 2022-06-03 RX ADMIN — GADOBENATE DIMEGLUMINE 19 ML: 529 INJECTION, SOLUTION INTRAVENOUS at 14:52

## 2022-06-04 LAB
QT INTERVAL: 482 MS
QTC INTERVAL: 477 MS

## 2022-06-05 LAB
BACTERIA SPEC AEROBE CULT: NORMAL
BACTERIA SPEC AEROBE CULT: NORMAL

## 2022-06-24 ENCOUNTER — TRANSCRIBE ORDERS (OUTPATIENT)
Dept: ADMINISTRATIVE | Facility: HOSPITAL | Age: 47
End: 2022-06-24

## 2022-06-24 DIAGNOSIS — Q93.89: Primary | ICD-10-CM

## 2022-06-24 DIAGNOSIS — I10 ESSENTIAL HYPERTENSION: ICD-10-CM

## 2022-06-24 DIAGNOSIS — Q85.00 NEUROFIBROMATOSIS: ICD-10-CM

## 2022-06-24 DIAGNOSIS — R00.2 PALPITATIONS: ICD-10-CM

## 2022-06-24 DIAGNOSIS — Z98.890 S/P TRICUSPID VALVE REPAIR: ICD-10-CM

## 2022-06-28 DIAGNOSIS — G43.109 MIGRAINE WITH AURA AND WITHOUT STATUS MIGRAINOSUS, NOT INTRACTABLE: ICD-10-CM

## 2022-06-29 RX ORDER — GALCANEZUMAB 120 MG/ML
120 INJECTION, SOLUTION SUBCUTANEOUS
Qty: 1 PEN | Refills: 1 | Status: SHIPPED | OUTPATIENT
Start: 2022-06-29 | End: 2023-01-09 | Stop reason: SDUPTHER

## 2022-07-12 ENCOUNTER — APPOINTMENT (OUTPATIENT)
Dept: MRI IMAGING | Facility: HOSPITAL | Age: 47
End: 2022-07-12

## 2022-07-22 ENCOUNTER — APPOINTMENT (OUTPATIENT)
Dept: CT IMAGING | Facility: HOSPITAL | Age: 47
End: 2022-07-22

## 2022-07-22 ENCOUNTER — HOSPITAL ENCOUNTER (EMERGENCY)
Facility: HOSPITAL | Age: 47
Discharge: HOME | End: 2022-07-22
Attending: EMERGENCY MEDICINE | Admitting: EMERGENCY MEDICINE

## 2022-07-22 VITALS
DIASTOLIC BLOOD PRESSURE: 69 MMHG | WEIGHT: 228 LBS | HEIGHT: 61 IN | RESPIRATION RATE: 14 BRPM | BODY MASS INDEX: 43.05 KG/M2 | SYSTOLIC BLOOD PRESSURE: 116 MMHG | OXYGEN SATURATION: 97 % | TEMPERATURE: 98 F | HEART RATE: 60 BPM

## 2022-07-22 DIAGNOSIS — N39.0 ACUTE UTI: ICD-10-CM

## 2022-07-22 DIAGNOSIS — N18.9 CHRONIC RENAL IMPAIRMENT, UNSPECIFIED CKD STAGE: ICD-10-CM

## 2022-07-22 DIAGNOSIS — R47.81 SLURRED SPEECH: Primary | ICD-10-CM

## 2022-07-22 LAB
ALBUMIN SERPL-MCNC: 4 G/DL (ref 3.5–5.2)
ALBUMIN/GLOB SERPL: 1.4 G/DL
ALP SERPL-CCNC: 75 U/L (ref 39–117)
ALT SERPL W P-5'-P-CCNC: 36 U/L (ref 1–33)
AMPHET+METHAMPHET UR QL: NEGATIVE
AMPHETAMINES UR QL: NEGATIVE
ANION GAP SERPL CALCULATED.3IONS-SCNC: 8 MMOL/L (ref 5–15)
APTT PPP: 32.9 SECONDS (ref 24.1–35)
AST SERPL-CCNC: 38 U/L (ref 1–32)
BACTERIA UR QL AUTO: ABNORMAL /HPF
BARBITURATES UR QL SCN: NEGATIVE
BASOPHILS # BLD AUTO: 0.04 10*3/MM3 (ref 0–0.2)
BASOPHILS NFR BLD AUTO: 0.7 % (ref 0–1.5)
BENZODIAZ UR QL SCN: POSITIVE
BILIRUB SERPL-MCNC: 0.4 MG/DL (ref 0–1.2)
BILIRUB UR QL STRIP: NEGATIVE
BUN SERPL-MCNC: 31 MG/DL (ref 6–20)
BUN/CREAT SERPL: 23 (ref 7–25)
BUPRENORPHINE SERPL-MCNC: NEGATIVE NG/ML
CALCIUM SPEC-SCNC: 9.2 MG/DL (ref 8.6–10.5)
CANNABINOIDS SERPL QL: NEGATIVE
CHLORIDE SERPL-SCNC: 109 MMOL/L (ref 98–107)
CLARITY UR: CLEAR
CO2 SERPL-SCNC: 24 MMOL/L (ref 22–29)
COCAINE UR QL: NEGATIVE
COLOR UR: YELLOW
CREAT SERPL-MCNC: 1.35 MG/DL (ref 0.57–1)
DEPRECATED RDW RBC AUTO: 51.8 FL (ref 37–54)
EGFRCR SERPLBLD CKD-EPI 2021: 49.5 ML/MIN/1.73
EOSINOPHIL # BLD AUTO: 0.26 10*3/MM3 (ref 0–0.4)
EOSINOPHIL NFR BLD AUTO: 4.6 % (ref 0.3–6.2)
ERYTHROCYTE [DISTWIDTH] IN BLOOD BY AUTOMATED COUNT: 16 % (ref 12.3–15.4)
GLOBULIN UR ELPH-MCNC: 2.8 GM/DL
GLUCOSE SERPL-MCNC: 91 MG/DL (ref 65–99)
GLUCOSE UR STRIP-MCNC: NEGATIVE MG/DL
HCT VFR BLD AUTO: 34.7 % (ref 34–46.6)
HGB BLD-MCNC: 10.6 G/DL (ref 12–15.9)
HGB UR QL STRIP.AUTO: NEGATIVE
HYALINE CASTS UR QL AUTO: ABNORMAL /LPF
INR PPP: 1.25 (ref 0.91–1.09)
KETONES UR QL STRIP: NEGATIVE
LEUKOCYTE ESTERASE UR QL STRIP.AUTO: ABNORMAL
LYMPHOCYTES # BLD AUTO: 0.51 10*3/MM3 (ref 0.7–3.1)
LYMPHOCYTES NFR BLD AUTO: 9.1 % (ref 19.6–45.3)
MCH RBC QN AUTO: 26.7 PG (ref 26.6–33)
MCHC RBC AUTO-ENTMCNC: 30.5 G/DL (ref 31.5–35.7)
MCV RBC AUTO: 87.4 FL (ref 79–97)
METHADONE UR QL SCN: NEGATIVE
MONOCYTES # BLD AUTO: 0.7 10*3/MM3 (ref 0.1–0.9)
MONOCYTES NFR BLD AUTO: 12.5 % (ref 5–12)
NEUTROPHILS NFR BLD AUTO: 4.07 10*3/MM3 (ref 1.7–7)
NEUTROPHILS NFR BLD AUTO: 72.6 % (ref 42.7–76)
NITRITE UR QL STRIP: NEGATIVE
OPIATES UR QL: NEGATIVE
OXYCODONE UR QL SCN: NEGATIVE
PCP UR QL SCN: NEGATIVE
PH UR STRIP.AUTO: 6.5 [PH] (ref 5–8)
PLATELET # BLD AUTO: 197 10*3/MM3 (ref 140–450)
PMV BLD AUTO: 12.5 FL (ref 6–12)
POTASSIUM SERPL-SCNC: 4.3 MMOL/L (ref 3.5–5.2)
PROPOXYPH UR QL: NEGATIVE
PROT SERPL-MCNC: 6.8 G/DL (ref 6–8.5)
PROT UR QL STRIP: NEGATIVE
PROTHROMBIN TIME: 15.2 SECONDS (ref 11.9–14.6)
RBC # BLD AUTO: 3.97 10*6/MM3 (ref 3.77–5.28)
RBC # UR STRIP: ABNORMAL /HPF
REF LAB TEST METHOD: ABNORMAL
SODIUM SERPL-SCNC: 141 MMOL/L (ref 136–145)
SP GR UR STRIP: 1.02 (ref 1–1.03)
SQUAMOUS #/AREA URNS HPF: ABNORMAL /HPF
TRICYCLICS UR QL SCN: NEGATIVE
UROBILINOGEN UR QL STRIP: ABNORMAL
WBC # UR STRIP: ABNORMAL /HPF
WBC NRBC COR # BLD: 5.61 10*3/MM3 (ref 3.4–10.8)

## 2022-07-22 PROCEDURE — 85730 THROMBOPLASTIN TIME PARTIAL: CPT | Performed by: EMERGENCY MEDICINE

## 2022-07-22 PROCEDURE — 85025 COMPLETE CBC W/AUTO DIFF WBC: CPT | Performed by: EMERGENCY MEDICINE

## 2022-07-22 PROCEDURE — 87086 URINE CULTURE/COLONY COUNT: CPT | Performed by: EMERGENCY MEDICINE

## 2022-07-22 PROCEDURE — 80053 COMPREHEN METABOLIC PANEL: CPT | Performed by: EMERGENCY MEDICINE

## 2022-07-22 PROCEDURE — 70450 CT HEAD/BRAIN W/O DYE: CPT

## 2022-07-22 PROCEDURE — 99284 EMERGENCY DEPT VISIT MOD MDM: CPT | Performed by: PSYCHIATRY & NEUROLOGY

## 2022-07-22 PROCEDURE — 81001 URINALYSIS AUTO W/SCOPE: CPT | Performed by: EMERGENCY MEDICINE

## 2022-07-22 PROCEDURE — 80306 DRUG TEST PRSMV INSTRMNT: CPT | Performed by: PHYSICIAN ASSISTANT

## 2022-07-22 PROCEDURE — 99283 EMERGENCY DEPT VISIT LOW MDM: CPT

## 2022-07-22 PROCEDURE — 93010 ELECTROCARDIOGRAM REPORT: CPT | Performed by: INTERNAL MEDICINE

## 2022-07-22 PROCEDURE — 93005 ELECTROCARDIOGRAM TRACING: CPT | Performed by: EMERGENCY MEDICINE

## 2022-07-22 PROCEDURE — 85610 PROTHROMBIN TIME: CPT | Performed by: EMERGENCY MEDICINE

## 2022-07-22 RX ORDER — SODIUM CHLORIDE 0.9 % (FLUSH) 0.9 %
10 SYRINGE (ML) INJECTION AS NEEDED
Status: DISCONTINUED | OUTPATIENT
Start: 2022-07-22 | End: 2022-07-22 | Stop reason: HOSPADM

## 2022-07-22 RX ORDER — NITROFURANTOIN 25; 75 MG/1; MG/1
100 CAPSULE ORAL 2 TIMES DAILY
Qty: 14 CAPSULE | Refills: 0 | Status: SHIPPED | OUTPATIENT
Start: 2022-07-22 | End: 2023-01-09

## 2022-07-23 LAB — BACTERIA SPEC AEROBE CULT: NORMAL

## 2022-07-25 LAB
QT INTERVAL: 482 MS
QTC INTERVAL: 469 MS

## 2022-07-30 DIAGNOSIS — G43.109 MIGRAINE WITH AURA AND WITHOUT STATUS MIGRAINOSUS, NOT INTRACTABLE: ICD-10-CM

## 2022-08-01 RX ORDER — MEMANTINE HYDROCHLORIDE 10 MG/1
TABLET ORAL
Qty: 60 TABLET | Refills: 0 | Status: SHIPPED | OUTPATIENT
Start: 2022-08-01 | End: 2022-09-23

## 2022-08-02 DIAGNOSIS — G43.109 MIGRAINE WITH AURA AND WITHOUT STATUS MIGRAINOSUS, NOT INTRACTABLE: ICD-10-CM

## 2022-08-02 RX ORDER — TOPIRAMATE 200 MG/1
CAPSULE, EXTENDED RELEASE ORAL
Qty: 30 CAPSULE | Refills: 0 | Status: SHIPPED | OUTPATIENT
Start: 2022-08-02 | End: 2022-09-01

## 2022-08-22 DIAGNOSIS — G43.119 INTRACTABLE MIGRAINE WITH AURA WITHOUT STATUS MIGRAINOSUS: ICD-10-CM

## 2022-08-22 RX ORDER — AMLODIPINE BESYLATE 5 MG/1
TABLET ORAL
Qty: 30 TABLET | Refills: 2 | Status: SHIPPED | OUTPATIENT
Start: 2022-08-22 | End: 2022-11-17

## 2022-09-01 DIAGNOSIS — G43.109 MIGRAINE WITH AURA AND WITHOUT STATUS MIGRAINOSUS, NOT INTRACTABLE: ICD-10-CM

## 2022-09-01 RX ORDER — TOPIRAMATE 200 MG/1
CAPSULE, EXTENDED RELEASE ORAL
Qty: 30 CAPSULE | Refills: 0 | Status: SHIPPED | OUTPATIENT
Start: 2022-09-01 | End: 2022-10-13

## 2022-09-23 DIAGNOSIS — G43.109 MIGRAINE WITH AURA AND WITHOUT STATUS MIGRAINOSUS, NOT INTRACTABLE: ICD-10-CM

## 2022-09-23 RX ORDER — MEMANTINE HYDROCHLORIDE 10 MG/1
TABLET ORAL
Qty: 60 TABLET | Refills: 0 | Status: SHIPPED | OUTPATIENT
Start: 2022-09-23 | End: 2023-01-09 | Stop reason: SDUPTHER

## 2022-10-13 DIAGNOSIS — G43.109 MIGRAINE WITH AURA AND WITHOUT STATUS MIGRAINOSUS, NOT INTRACTABLE: ICD-10-CM

## 2022-10-13 RX ORDER — TOPIRAMATE 200 MG/1
CAPSULE, EXTENDED RELEASE ORAL
Qty: 30 CAPSULE | Refills: 0 | Status: SHIPPED | OUTPATIENT
Start: 2022-10-13 | End: 2022-11-10

## 2022-11-10 DIAGNOSIS — G43.109 MIGRAINE WITH AURA AND WITHOUT STATUS MIGRAINOSUS, NOT INTRACTABLE: ICD-10-CM

## 2022-11-10 RX ORDER — TOPIRAMATE 200 MG/1
CAPSULE, EXTENDED RELEASE ORAL
Qty: 30 CAPSULE | Refills: 0 | Status: SHIPPED | OUTPATIENT
Start: 2022-11-10 | End: 2022-12-15

## 2022-11-17 DIAGNOSIS — G43.119 INTRACTABLE MIGRAINE WITH AURA WITHOUT STATUS MIGRAINOSUS: ICD-10-CM

## 2022-11-17 RX ORDER — AMLODIPINE BESYLATE 5 MG/1
TABLET ORAL
Qty: 30 TABLET | Refills: 0 | Status: SHIPPED | OUTPATIENT
Start: 2022-11-17 | End: 2023-01-09 | Stop reason: SDUPTHER

## 2022-12-14 DIAGNOSIS — G43.109 MIGRAINE WITH AURA AND WITHOUT STATUS MIGRAINOSUS, NOT INTRACTABLE: ICD-10-CM

## 2022-12-15 RX ORDER — TOPIRAMATE 200 MG/1
CAPSULE, EXTENDED RELEASE ORAL
Qty: 30 CAPSULE | Refills: 0 | Status: SHIPPED | OUTPATIENT
Start: 2022-12-15 | End: 2023-01-09 | Stop reason: SDUPTHER

## 2022-12-18 DIAGNOSIS — G43.119 INTRACTABLE MIGRAINE WITH AURA WITHOUT STATUS MIGRAINOSUS: ICD-10-CM

## 2022-12-19 RX ORDER — RIZATRIPTAN BENZOATE 5 MG/1
5 TABLET, ORALLY DISINTEGRATING ORAL ONCE AS NEEDED
Qty: 9 TABLET | Refills: 0 | Status: SHIPPED | OUTPATIENT
Start: 2022-12-19 | End: 2023-01-09

## 2023-01-02 DIAGNOSIS — G43.109 MIGRAINE WITH AURA AND WITHOUT STATUS MIGRAINOSUS, NOT INTRACTABLE: ICD-10-CM

## 2023-01-05 DIAGNOSIS — G43.109 MIGRAINE WITH AURA AND WITHOUT STATUS MIGRAINOSUS, NOT INTRACTABLE: ICD-10-CM

## 2023-01-09 ENCOUNTER — OFFICE VISIT (OUTPATIENT)
Dept: NEUROLOGY | Facility: CLINIC | Age: 48
End: 2023-01-09
Payer: COMMERCIAL

## 2023-01-09 VITALS
DIASTOLIC BLOOD PRESSURE: 78 MMHG | WEIGHT: 217 LBS | SYSTOLIC BLOOD PRESSURE: 126 MMHG | HEIGHT: 62 IN | BODY MASS INDEX: 39.93 KG/M2 | OXYGEN SATURATION: 99 % | HEART RATE: 63 BPM

## 2023-01-09 DIAGNOSIS — G43.109 MIGRAINE WITH AURA AND WITHOUT STATUS MIGRAINOSUS, NOT INTRACTABLE: ICD-10-CM

## 2023-01-09 DIAGNOSIS — C72.30: ICD-10-CM

## 2023-01-09 DIAGNOSIS — Q85.00 NEUROFIBROMATOSIS: Primary | ICD-10-CM

## 2023-01-09 PROCEDURE — 99214 OFFICE O/P EST MOD 30 MIN: CPT | Performed by: PHYSICIAN ASSISTANT

## 2023-01-09 RX ORDER — TOPIRAMATE 200 MG/1
200 CAPSULE, EXTENDED RELEASE ORAL 2 TIMES DAILY
Qty: 60 CAPSULE | Refills: 5 | Status: SHIPPED | OUTPATIENT
Start: 2023-01-09

## 2023-01-09 RX ORDER — AMLODIPINE BESYLATE 5 MG/1
5 TABLET ORAL DAILY
Qty: 30 TABLET | Refills: 5 | Status: SHIPPED | OUTPATIENT
Start: 2023-01-09

## 2023-01-09 RX ORDER — GALCANEZUMAB 120 MG/ML
120 INJECTION, SOLUTION SUBCUTANEOUS
Qty: 1 EACH | Refills: 11 | Status: SHIPPED | OUTPATIENT
Start: 2023-01-09

## 2023-01-09 RX ORDER — FENOFIBRATE 145 MG/1
145 TABLET, COATED ORAL DAILY
COMMUNITY
Start: 2022-12-14

## 2023-01-09 RX ORDER — MEMANTINE HYDROCHLORIDE 10 MG/1
10 TABLET ORAL 2 TIMES DAILY
Qty: 60 TABLET | Refills: 5 | Status: SHIPPED | OUTPATIENT
Start: 2023-01-09

## 2023-01-09 RX ORDER — BREXPIPRAZOLE 2 MG/1
2 TABLET ORAL DAILY
COMMUNITY
Start: 2023-01-03

## 2023-01-09 RX ORDER — TOPIRAMATE 200 MG/1
200 CAPSULE, EXTENDED RELEASE ORAL DAILY
Qty: 30 CAPSULE | Refills: 5 | Status: SHIPPED | OUTPATIENT
Start: 2023-01-09 | End: 2023-01-09 | Stop reason: SDUPTHER

## 2023-01-09 RX ORDER — MEMANTINE HYDROCHLORIDE 10 MG/1
TABLET ORAL
Qty: 60 TABLET | Refills: 2 | OUTPATIENT
Start: 2023-01-09

## 2023-01-09 RX ORDER — TOPIRAMATE 200 MG/1
CAPSULE, EXTENDED RELEASE ORAL
Qty: 30 CAPSULE | Refills: 0 | OUTPATIENT
Start: 2023-01-09

## 2023-01-09 NOTE — PROGRESS NOTES
Subjective   Sandy Estes is a 47 y.o. female who presents for follow-up of migraine headache, optic glioma, and neurofibromatosis.    History of Present Illness     Migraine headache  The patient states that she is doing well. She states that she is having headaches every other day. She states that the Maxalt is not working for her at all. She states that the Ubrelvy seems like it needed just a little bit more. She states that she is not sure if she is getting benefit from the Trokendi. She states that she saw her eye doctor last week and he told her that everything looks fine. She states that she had increased her bifocal.      The following portions of the patient's history were reviewed and updated as appropriate: allergies, current medications, past family history, past medical history, past social history, past surgical history and problem list.    Review of Systems:  A review of systems was performed, and positive findings are noted in the HPI.      Current Outpatient Medications:   •  amLODIPine (NORVASC) 5 MG tablet, Take 1 tablet by mouth Daily., Disp: 30 tablet, Rfl: 5  •  azelastine (ASTELIN) 0.1 % nasal spray, 2 sprays into the nostril(s) as directed by provider 2 (Two) Times a Day. Use in each nostril as directed, Disp: 30 mL, Rfl: 11  •  Budeson-Glycopyrrol-Formoterol (Breztri Aerosphere) 160-9-4.8 MCG/ACT aerosol inhaler, Inhale 2 puffs 2 (Two) Times a Day., Disp: , Rfl:   •  carvedilol (COREG) 6.25 MG tablet, Take 6.25 mg by mouth 2 (Two) Times a Day With Meals., Disp: , Rfl:   •  cetirizine (zyrTEC) 10 MG tablet, Take 10 mg by mouth Daily., Disp: , Rfl:   •  escitalopram (LEXAPRO) 20 MG tablet, Take 20 mg by mouth Daily., Disp: , Rfl:   •  fenofibrate (TRICOR) 145 MG tablet, Take 145 mg by mouth Daily., Disp: , Rfl:   •  galcanezumab-gnlm (Emgality) 120 MG/ML auto-injector pen, Inject 1 mL under the skin into the appropriate area as directed Every 30 (Thirty) Days., Disp: 1 each, Rfl: 11  •   hydrOXYzine (ATARAX) 50 MG tablet, Take 50 mg by mouth Every 4 (Four) Hours As Needed., Disp: , Rfl:   •  losartan (COZAAR) 100 MG tablet, Take 100 mg by mouth Daily., Disp: , Rfl:   •  memantine (NAMENDA) 10 MG tablet, Take 1 tablet by mouth 2 (Two) Times a Day., Disp: 60 tablet, Rfl: 5  •  methocarbamol (ROBAXIN) 500 MG tablet, Take 500 mg by mouth 3 (Three) Times a Day., Disp: , Rfl:   •  Mometasone Furoate 100 MCG/ACT aerosol, Inhale., Disp: , Rfl:   •  montelukast (SINGULAIR) 10 MG tablet, Take 10 mg by mouth Every Night., Disp: , Rfl:   •  pantoprazole (PROTONIX) 40 MG EC tablet, Take 40 mg by mouth Daily., Disp: , Rfl:   •  Rexulti 2 MG tablet, Take 2 mg by mouth Daily., Disp: , Rfl:   •  tiZANidine (ZANAFLEX) 2 MG tablet, Take 2 mg by mouth Every Night., Disp: , Rfl:   •  Topiramate ER (Trokendi XR) 200 MG capsule sustained-release 24 hr, Take 200 mg by mouth 2 (Two) Times a Day., Disp: 60 capsule, Rfl: 5  •  vitamin D (ERGOCALCIFEROL) 1.25 MG (37617 UT) capsule capsule, Take 50,000 Units by mouth Every 7 (Seven) Days., Disp: , Rfl:   •  zolpidem (AMBIEN) 10 MG tablet, Take 10 mg by mouth At Night As Needed for Sleep., Disp: , Rfl:   •  ubrogepant 100 MG tablet, Take 1 tablet by mouth As Needed (TAKE AT ONSET OF MIGRAINE, MAY REPEAT IN 2 HOURS, IF NEEDED. DO NOT EXCEED 200MG IN 24HRS.)., Disp: 16 tablet, Rfl: 11     Objective   Physical Exam  Vitals and nursing note reviewed.   HENT:      Head: Normocephalic.      Right Ear: Hearing and external ear normal.      Left Ear: Hearing and external ear normal.      Nose: Nose normal.      Mouth/Throat:      Pharynx: Oropharynx is clear.   Eyes:      General: Lids are normal. Vision grossly intact. Gaze aligned appropriately. No scleral icterus.     Extraocular Movements: Extraocular movements intact.      Conjunctiva/sclera: Conjunctivae normal.      Pupils: Pupils are equal, round, and reactive to light.      Visual Fields: Right eye visual fields normal and left  eye visual fields normal.   Neck:      Vascular: No carotid bruit or JVD.      Trachea: Trachea and phonation normal.   Cardiovascular:      Rate and Rhythm: Normal rate.      Heart sounds: Normal heart sounds.   Pulmonary:      Effort: Pulmonary effort is normal.      Breath sounds: Normal breath sounds.   Musculoskeletal:      Cervical back: Normal range of motion.   Skin:     General: Skin is warm and dry.   Neurological:      Mental Status: She is alert and oriented to person, place, and time.      GCS: GCS eye subscore is 4. GCS verbal subscore is 5. GCS motor subscore is 6.      Sensory: Sensation is intact.      Motor: Motor function is intact.      Coordination: Coordination is intact.      Gait: Gait is intact.      Deep Tendon Reflexes: Reflexes are normal and symmetric.   Psychiatric:         Attention and Perception: Attention and perception normal.         Mood and Affect: Mood and affect normal.         Speech: Speech normal.         Behavior: Behavior normal.         Thought Content: Thought content normal.         Cognition and Memory: Cognition and memory normal.         Judgment: Judgment normal.           Assessment & Plan   Diagnoses and all orders for this visit:    1. Neurofibromatosis (HCC) (Primary)    2. Low-grade optic pathway glioma (HCC)    3. Migraine with aura and without status migrainosus, not intractable  -     ubrogepant 100 MG tablet; Take 1 tablet by mouth As Needed (TAKE AT ONSET OF MIGRAINE, MAY REPEAT IN 2 HOURS, IF NEEDED. DO NOT EXCEED 200MG IN 24HRS.).  Dispense: 16 tablet; Refill: 11  -     amLODIPine (NORVASC) 5 MG tablet; Take 1 tablet by mouth Daily.  Dispense: 30 tablet; Refill: 5  -     galcanezumab-gnlm (Emgality) 120 MG/ML auto-injector pen; Inject 1 mL under the skin into the appropriate area as directed Every 30 (Thirty) Days.  Dispense: 1 each; Refill: 11  -     memantine (NAMENDA) 10 MG tablet; Take 1 tablet by mouth 2 (Two) Times a Day.  Dispense: 60 tablet;  Refill: 5  -     Discontinue: Topiramate ER (Trokendi XR) 200 MG capsule sustained-release 24 hr; Take 200 mg by mouth Daily.  Dispense: 30 capsule; Refill: 5  -     Topiramate ER (Trokendi XR) 200 MG capsule sustained-release 24 hr; Take 200 mg by mouth 2 (Two) Times a Day.  Dispense: 60 capsule; Refill: 5      1. Migraine headaches  - We will increase her Trokendi to 400 mg a day and increase her Ubrelvy to 100 mg episodically.               Transcribed from ambient dictation for DEE DEE Billings by Mandy Yu.  01/09/23   10:16 CST    Patient or patient representative verbalized consent to the visit recording.

## 2023-01-10 DIAGNOSIS — Z12.31 VISIT FOR SCREENING MAMMOGRAM: Primary | ICD-10-CM

## 2023-01-13 ENCOUNTER — TELEPHONE (OUTPATIENT)
Dept: NEUROLOGY | Facility: CLINIC | Age: 48
End: 2023-01-13
Payer: COMMERCIAL

## 2023-01-13 NOTE — TELEPHONE ENCOUNTER
PATIENT CALLING REGARDING UBRELVEY -  SHE ADVISING THAT PHARMACY CANNOT  ORDER THE MG ON THE SCRIPT AND SHE CANNOT TAKE A LOWER DOSE.    PATIENT ASKING FOR CALL TO PHARMACY TO SOLVE ISSUE  [facility] - [place], [place] - [address] [phone] [facility] [phone] FX    PLEASE ADVISE PATIENT OF SOLUTION    THANK YOU

## 2023-01-24 DIAGNOSIS — G43.109 MIGRAINE WITH AURA AND WITHOUT STATUS MIGRAINOSUS, NOT INTRACTABLE: ICD-10-CM

## 2023-01-24 RX ORDER — GALCANEZUMAB 120 MG/ML
INJECTION, SOLUTION SUBCUTANEOUS
Qty: 1 ML | Refills: 0 | OUTPATIENT
Start: 2023-01-24

## 2023-02-16 DIAGNOSIS — G43.109 MIGRAINE WITH AURA AND WITHOUT STATUS MIGRAINOSUS, NOT INTRACTABLE: ICD-10-CM

## 2023-02-16 RX ORDER — GALCANEZUMAB 120 MG/ML
120 INJECTION, SOLUTION SUBCUTANEOUS
Qty: 1 EACH | Refills: 11 | Status: CANCELLED | OUTPATIENT
Start: 2023-02-16

## 2023-02-28 ENCOUNTER — NURSE TRIAGE (OUTPATIENT)
Dept: CALL CENTER | Facility: HOSPITAL | Age: 48
End: 2023-02-28
Payer: COMMERCIAL

## 2023-02-28 NOTE — TELEPHONE ENCOUNTER
Reason for Disposition  • History of heart disease  (i.e., heart attack, bypass surgery, angina, angioplasty, CHF) (Exception: brief heartbeat symptoms that went away and now feels well)    Additional Information  • Negative: Passed out (i.e., lost consciousness, collapsed and was not responding)  • Negative: Shock suspected (e.g., cold/pale/clammy skin, too weak to stand, low BP, rapid pulse)  • Negative: Difficult to awaken or acting confused (e.g., disoriented, slurred speech)  • Negative: Visible sweat on face or sweat dripping down face  • Negative: Unable to walk, or can only walk with assistance (e.g., requires support)  • Negative: [1] Received SHOCK from implantable cardiac defibrillator AND [2] persisting symptoms (i.e., palpitations, lightheadedness)  • Negative: [1] Dizziness, lightheadedness, or weakness AND [2] heart beating very rapidly (e.g., > 140 / minute)  • Negative: [1] Dizziness, lightheadedness, or weakness AND [2] heart beating very slowly  (e.g., < 50 / minute)  • Negative: Sounds like a life-threatening emergency to the triager  • Negative: Chest pain  • Negative: Implantable Cardiac Defibrillator (ICD) or a pacemaker symptoms or questions  • Negative: Difficulty breathing  • Negative: Dizziness, lightheadedness, or weakness  • Negative: [1] Heart beating very rapidly (e.g., > 140 / minute) AND [2] present now  (Exception: during exercise)  • Negative: Heart beating very slowly (e.g., < 50 / minute)  (Exception: athlete and heart rate normal for caller)  • Negative: New or worsened shortness of breath with activity (dyspnea on exertion)  • Negative: Patient sounds very sick or weak to the triager  • Negative: [1] Heart beating very rapidly (e.g., > 140 / minute) AND [2] not present now  (Exception: during exercise)  • Negative: [1] Skipped or extra beat(s) AND [2] increases with exercise or exertion  • Negative: [1] Skipped or extra beat(s) AND [2] occurs 4 or more times per minute  •  "Negative: New or worsened ankle swelling    Answer Assessment - Initial Assessment Questions  1. DESCRIPTION: \"Please describe your heart rate or heartbeat that you are having\" (e.g., fast/slow, regular/irregular, skipped or extra beats, \"palpitations\")      Irregular and beating out of chest  2. ONSET: \"When did it start?\" (Minutes, hours or days)       315 AM today  3. DURATION: \"How long does it last\" (e.g., seconds, minutes, hours)      Constant since 315AM  4. PATTERN \"Does it come and go, or has it been constant since it started?\"  \"Does it get worse with exertion?\"   \"Are you feeling it now?\"      Constant since it started  5. TAP: \"Using your hand, can you tap out what you are feeling on a chair or table in front of you, so that I can hear?\" (Note: not all patients can do this)        fast  6. HEART RATE: \"Can you tell me your heart rate?\" \"How many beats in 15 seconds?\"  (Note: not all patients can do this)        106-122 HR  7. RECURRENT SYMPTOM: \"Have you ever had this before?\" If Yes, ask: \"When was the last time?\" and \"What happened that time?\"       denies  8. CAUSE: \"What do you think is causing the palpitations?\"      unknown  9. CARDIAC HISTORY: \"Do you have any history of heart disease?\" (e.g., heart attack, angina, bypass surgery, angioplasty, arrhythmia)       CHF, heart surgery in childhood  10. OTHER SYMPTOMS: \"Do you have any other symptoms?\" (e.g., dizziness, chest pain, sweating, difficulty breathing)        denies  11. PREGNANCY: \"Is there any chance you are pregnant?\" \"When was your last menstrual period?\"        denies    Protocols used: HEART RATE AND HEARTBEAT QUESTIONS-ADULT-AH      "

## 2023-04-18 ENCOUNTER — PROCEDURE VISIT (OUTPATIENT)
Dept: SURGERY | Age: 48
End: 2023-04-18
Payer: MEDICAID

## 2023-04-18 DIAGNOSIS — D36.10 NEUROFIBROMA: Primary | ICD-10-CM

## 2023-04-18 DIAGNOSIS — L91.8 SKIN TAG: ICD-10-CM

## 2023-04-18 PROCEDURE — 11200 RMVL SKIN TAGS UP TO&INC 15: CPT | Performed by: PHYSICIAN ASSISTANT

## 2023-04-18 PROCEDURE — 11401 EXC TR-EXT B9+MARG 0.6-1 CM: CPT | Performed by: PHYSICIAN ASSISTANT

## 2023-04-28 NOTE — PROGRESS NOTES
Ms. Farrukh Rosas presents for excision of 2 neurofibromas and a skin tag. She was seen previously and the procedure discussed. PROCEDURE:  The skin was cleansed with alcohol and anesthetized with 1% lidocaine with epinephrine. The 3 areas were prepped with chloroprep. A 15 blade was used to make an elliptical incision around the neurofibroma on her upper back. It was sharply dissected out. The skin was closed with 3-0 nylon. An identical procedure was repeated for the neurofibroma on her left upper abdominal wall. The skin tag was shaved with a 15 blade. The skin was again cleaned with alcohol and hemostasis was achieved with silver nitrate. She tolerated the procedure well. The 2 neurofibromas were sent for pathology. She will return for suture removal in 10 days.

## 2023-05-01 ENCOUNTER — OFFICE VISIT (OUTPATIENT)
Dept: SURGERY | Age: 48
End: 2023-05-01

## 2023-05-01 VITALS
OXYGEN SATURATION: 99 % | TEMPERATURE: 98.6 F | WEIGHT: 218.6 LBS | HEIGHT: 61 IN | BODY MASS INDEX: 41.27 KG/M2 | HEART RATE: 68 BPM

## 2023-05-01 DIAGNOSIS — Z09 POSTOPERATIVE EXAMINATION: Primary | ICD-10-CM

## 2023-05-01 PROCEDURE — 99024 POSTOP FOLLOW-UP VISIT: CPT | Performed by: PHYSICIAN ASSISTANT

## 2023-05-01 NOTE — PROGRESS NOTES
Postop Progress Note    Subjective    Jhonatan Taylor presents to the office for postop follow up. Pathology showed neurofibromas. Objective    Vitals:    05/01/23 0909   Pulse: 68   Temp: 98.6 °F (37 °C)   SpO2: 99%     General: alert, cooperative and no distress  Incisions: healing well. Sutures removed without incident. Assessment  Doing well postoperatively.     Plan  Follow up: as needed    Electronically signed by Maeve Tavares PA-C on 5/1/2023 at 9:26 AM

## 2023-08-16 ENCOUNTER — OFFICE VISIT (OUTPATIENT)
Dept: GASTROENTEROLOGY | Age: 48
End: 2023-08-16
Payer: MEDICAID

## 2023-08-16 VITALS
OXYGEN SATURATION: 96 % | SYSTOLIC BLOOD PRESSURE: 138 MMHG | WEIGHT: 211 LBS | DIASTOLIC BLOOD PRESSURE: 80 MMHG | HEART RATE: 68 BPM | HEIGHT: 62 IN | BODY MASS INDEX: 38.83 KG/M2

## 2023-08-16 DIAGNOSIS — R14.0 BLOATING: ICD-10-CM

## 2023-08-16 DIAGNOSIS — D64.9 ANEMIA, UNSPECIFIED TYPE: Primary | ICD-10-CM

## 2023-08-16 PROCEDURE — 99204 OFFICE O/P NEW MOD 45 MIN: CPT | Performed by: NURSE PRACTITIONER

## 2023-08-16 PROCEDURE — G8417 CALC BMI ABV UP PARAM F/U: HCPCS | Performed by: NURSE PRACTITIONER

## 2023-08-16 PROCEDURE — 4004F PT TOBACCO SCREEN RCVD TLK: CPT | Performed by: NURSE PRACTITIONER

## 2023-08-16 PROCEDURE — G8427 DOCREV CUR MEDS BY ELIG CLIN: HCPCS | Performed by: NURSE PRACTITIONER

## 2023-08-16 RX ORDER — BREXPIPRAZOLE 2 MG/1
2 TABLET ORAL DAILY
COMMUNITY
Start: 2023-08-07

## 2023-08-16 ASSESSMENT — ENCOUNTER SYMPTOMS
ABDOMINAL DISTENTION: 0
NAUSEA: 1
RECTAL PAIN: 0
DIARRHEA: 0
CONSTIPATION: 0
BLOOD IN STOOL: 0
ABDOMINAL PAIN: 0
TROUBLE SWALLOWING: 0
VOMITING: 0
CHOKING: 0
ANAL BLEEDING: 0
SHORTNESS OF BREATH: 0
COUGH: 0

## 2023-08-16 NOTE — PROGRESS NOTES
Subjective:     Patient ID: Susan Chowdhury is a 57 y.o. female  PCP: Suzanne Vazquez  Referring Provider: Monserrat Joel NP    HPI  Patient presents to the office today with the following complaints: Anemia      Patient seen in the office today referred for anemia. Reports she has not noticed any bleeding and no dark stools. She was unaware of being anemic until her routine labs showed she was labs are below and more labs are in Epic and I have reviewed these. Reports she is having issues with bloating and nausea lately     Mother had a GIST tumor      Assessment:     1. Anemia, unspecified type  2. Bloating           Plan:   Schedule Colonoscopy and EGD  Instruct on bowel prep. Nothing to eat or drink after midnight the day of the exam.  Unable to drive for 24 hours after the procedure. No aspirin or nonsteroidal anti-inflammatories for 5 days before procedure. I have discussed the benefits, alternatives, and risks (including bleeding, perforation and death)  for pursuing Endoscopy (EGD/Colonscopy/EUS/ERCP) with the patient and they are willing to continue. We also discussed the need for anesthesia, IV access, proper dietary changes, medication changes if necessary, and need for bowel prep (if ordered) prior to their Endoscopic procedure. They are aware they must have someone accompany them to their scheduled procedure to drive them home - they agree to the above and are willing to continue. Orders  No orders of the defined types were placed in this encounter. Medications  No orders of the defined types were placed in this encounter.         Patient History:     Past Medical History:   Diagnosis Date    Anxiety     Asthma     Bell's palsy     during pregnancy in 1994    Chronic back pain     Depression     Endometriosis     Hyperthyroidism     Insomnia     Migraines     better with massage therapy    Neurofibromatosis ([address])     Neurofibromatosis ([address])     tumor on her 4th L finger and cafe au lait

## 2023-09-19 ENCOUNTER — ANESTHESIA EVENT (OUTPATIENT)
Dept: OPERATING ROOM | Age: 48
End: 2023-09-19

## 2023-09-19 ENCOUNTER — APPOINTMENT (OUTPATIENT)
Dept: OPERATING ROOM | Age: 48
End: 2023-09-19
Attending: INTERNAL MEDICINE

## 2023-09-19 ENCOUNTER — HOSPITAL ENCOUNTER (OUTPATIENT)
Age: 48
Setting detail: OUTPATIENT SURGERY
Discharge: HOME | End: 2023-09-19
Attending: INTERNAL MEDICINE | Admitting: INTERNAL MEDICINE

## 2023-09-19 ENCOUNTER — HOSPITAL ENCOUNTER (OUTPATIENT)
Age: 48
Setting detail: SPECIMEN
Discharge: HOME | End: 2023-09-19

## 2023-09-19 ENCOUNTER — ANESTHESIA (OUTPATIENT)
Dept: OPERATING ROOM | Age: 48
End: 2023-09-19

## 2023-09-19 VITALS
SYSTOLIC BLOOD PRESSURE: 129 MMHG | HEART RATE: 64 BPM | OXYGEN SATURATION: 96 % | HEIGHT: 61 IN | WEIGHT: 210 LBS | RESPIRATION RATE: 18 BRPM | DIASTOLIC BLOOD PRESSURE: 78 MMHG | TEMPERATURE: 97.5 F | BODY MASS INDEX: 39.65 KG/M2

## 2023-09-19 PROCEDURE — 88342 IMHCHEM/IMCYTCHM 1ST ANTB: CPT

## 2023-09-19 PROCEDURE — 45378 DIAGNOSTIC COLONOSCOPY: CPT

## 2023-09-19 PROCEDURE — 43239 EGD BIOPSY SINGLE/MULTIPLE: CPT

## 2023-09-19 PROCEDURE — 88305 TISSUE EXAM BY PATHOLOGIST: CPT

## 2023-09-19 RX ORDER — PROPOFOL 10 MG/ML
INJECTION, EMULSION INTRAVENOUS PRN
Status: DISCONTINUED | OUTPATIENT
Start: 2023-09-19 | End: 2023-09-19 | Stop reason: SDUPTHER

## 2023-09-19 RX ORDER — LIDOCAINE HYDROCHLORIDE 10 MG/ML
INJECTION, SOLUTION EPIDURAL; INFILTRATION; INTRACAUDAL; PERINEURAL PRN
Status: DISCONTINUED | OUTPATIENT
Start: 2023-09-19 | End: 2023-09-19 | Stop reason: SDUPTHER

## 2023-09-19 RX ORDER — BUDESONIDE, GLYCOPYRROLATE, AND FORMOTEROL FUMARATE 160; 9; 4.8 UG/1; UG/1; UG/1
2 AEROSOL, METERED RESPIRATORY (INHALATION) 2 TIMES DAILY
COMMUNITY

## 2023-09-19 RX ORDER — SODIUM CHLORIDE, SODIUM LACTATE, POTASSIUM CHLORIDE, CALCIUM CHLORIDE 600; 310; 30; 20 MG/100ML; MG/100ML; MG/100ML; MG/100ML
INJECTION, SOLUTION INTRAVENOUS CONTINUOUS
Status: DISCONTINUED | OUTPATIENT
Start: 2023-09-19 | End: 2023-09-19 | Stop reason: HOSPADM

## 2023-09-19 RX ADMIN — LIDOCAINE HYDROCHLORIDE 30 MG: 10 INJECTION, SOLUTION EPIDURAL; INFILTRATION; INTRACAUDAL; PERINEURAL at 14:28

## 2023-09-19 RX ADMIN — PROPOFOL 500 MG: 10 INJECTION, EMULSION INTRAVENOUS at 14:28

## 2023-09-19 RX ADMIN — SODIUM CHLORIDE, SODIUM LACTATE, POTASSIUM CHLORIDE, CALCIUM CHLORIDE: 600; 310; 30; 20 INJECTION, SOLUTION INTRAVENOUS at 12:48

## 2023-09-19 NOTE — ANESTHESIA POSTPROCEDURE EVALUATION
Department of Anesthesiology  Postprocedure Note    Patient: Janet Martinez  MRN: [id number]  YOB: 1975  Date of evaluation: 9/19/2023      Procedure Summary     Date: 09/19/23 Room / Location: [facility] ENDO 02 / [address]    Anesthesia Start: 8802 Anesthesia Stop:     Procedures:       EGD BIOPSY (Esophagus)      COLONOSCOPY DIAGNOSTIC (Abdomen) Diagnosis:       Bloating      Nausea      Anemia, unspecified type      (Bloating [R14.0])      (Nausea [R11.0])      (Anemia, unspecified type [D64.9])    Surgeons: Forbes Hamman, MD Responsible Provider: TROY Moore CRNA    Anesthesia Type: general, TIVA ASA Status: 3          Anesthesia Type: No value filed.     Ariadna Phase I:      Ariadna Phase II:        Anesthesia Post Evaluation    Patient location during evaluation: bedside  Patient participation: complete - patient participated  Level of consciousness: sleepy but conscious  Pain score: 0  Airway patency: patent  Nausea & Vomiting: no nausea and no vomiting  Complications: no  Cardiovascular status: blood pressure returned to baseline  Respiratory status: acceptable, room air and spontaneous ventilation  Hydration status: euvolemic  Pain management: adequate

## 2023-09-19 NOTE — OP NOTE
Patient: Sonal Jain : 1975  Med Rec#: [id number] Acc#: [id number]   Primary Care Provider Lb De León    Date of Procedure:  2023    Endoscopist: Renetta Thakur MD, MD    Referring Provider: Lb De León,     Operation Performed: Colonoscopy up to the terminal ileum    Indications: For both EGD and colonoscopy exams today:  1. Anemia, unspecified type  2. Bloating  3. Family history-mother GIST and colon polyps  4. Nausea    Anesthesia:  Sedation was administered by anesthesia who monitored the patient during the procedure. I met with Sonal Jain prior to procedure. We discussed the procedure itself, and I have discussed the risks of endoscopy (including-- but not limited to-- pain, discomfort, bleeding potentially requiring second endoscopic procedure and/or blood transfusion, organ perforation requiring operative repair, damage to organs near the colon, infection, aspiration, cardiopulmonary/allergic reaction), benefits, indications to endoscopy. Additionally, we discussed options other than colonoscopy. The patient expressed understanding. All questions answered. The patient decided to proceed with the procedure. Signed informed consent was placed on the chart. Blood Loss: minimal    Withdrawal time: More than 6 minutes  Bowel Prep: Fair  with small amounts of thick semisolid stool and thick, opaque liquid scattered in patchy segments throughout the colon obscuring the underlying mucosa. Lesions including polyps may have been missed. Complications: no immediate complications    DESCRIPTION OF PROCEDURE:     A time out was performed. After written informed consent was obtained, the patient was placed in the left lateral position. The perianal area was inspected, and a digital rectal exam was performed. A rectal exam was performed: normal tone, no palpable lesions.  At this point, a forward viewing Olympus colonoscope was inserted into the anus and carefully advanced to the terminal ileum. The cecum was identified by the ileocecal valve and the appendiceal orifice. The colonoscope was then slowly withdrawn with careful inspection of the mucosa in a linear and circumferential fashion. The scope was retroflexed in the rectum. Suction was utilized during the procedure to remove as much air as possible from the bowel. The colonoscope was removed from the patient, and the procedure was terminated. Findings are listed below. Findings:     NO large polyps or masses or strictures or colitis documented ileitis. Suboptimal exam due to prep quality as described above. Internal hemorrhoids-Grade 1 without any bleeding stigmata  Where it was clearly visible, the mucosa appeared normal throughout the entire examined colon  Retroflexion in the rectum was otherwise normal and revealed no further abnormalities      Recommendations:  1. Repeat colonoscopy: In 5 years for colon cancer screening due to family history and based on coloscopy findings today including prep quality; sooner if her personal or family history as pertaining to colorectal cancer risk changes requiring an earlier exam or if the patient were to develop lower GI symptoms such as bleeding, abdominal pain, change in bowel habits or stool caliber or if the patient has anemia or unexplained weight loss in the future. 2.- Resume previous meds and diet  - GI clinic f/u 6-8 weeks with Ms. Byron Mancuso  - Keep scheduled f/u appts with other MDs     - NO ASA/NSAIDs x 2 weeks     Findings and recommendations were discussed w/ the patient. A copy of the images was provided.     (Please note that portions of this note were completed with a voice recognition program. Efforts were made to edit the dictations but occasionally words may be mis-transcribed.)     Sandra Downey MD, MD  9/19/2023  2:44 PM

## 2023-09-19 NOTE — OP NOTE
Endoscopic Procedure Note    Patient: Jorge Alberto Hernandez : 1975  Med Rec#: [id number] Acc#: [id number]     Primary Care Provider Adebayo Reynolds    Endoscopist: Vandana Gore MD, MD    Date of Procedure:  2023    Procedure:   1. EGD with cold biopsies    Indications: For both EGD and colonoscopy exams today:  1. Anemia, unspecified type  2. Bloating  3. Family history-mother GIST and colon polyps  4. Nausea    Anesthesia:  Sedation was administered by anesthesia who monitored the patient during the procedure. Estimated Blood Loss: minimal    Procedure:   After reviewing the patient's chart and obtaining informed consent, the patient was placed in the left lateral decubitus position. A forward-viewing Olympus endoscope was lubricated and inserted through the mouth into the oropharynx. Under direct visualization, the upper esophagus was intubated. The scope was advanced to the level of the third portion of duodenum. Scope was slowly withdrawn with careful inspection of the mucosal surfaces. The scope was retroflexed for inspection of the gastric fundus and incisura. Findings and maneuvers are listed in impression below. The patient tolerated the procedure well. The scope was removed. There were no immediate complications. Findings/IMPRESSION:  Esophagus: normal and a normal EG junction at 30 cm. There is a small 2 cm in length sliding hiatal hernia present. Stomach:  abnormal: Patchy mucosal changes with erythema and few small 1 to 2 mm erosions in the distal antrum suggestive of mild chemical gastritis noted -  Gastric biopsies were taken from the antrum and body to rule out Helicobacter pylori infection. NO ulcers or masses or gastric outlet obstruction or retained food or fluid. Rugae were normal and lumen distended well with insufflation. Retroflexed views otherwise revealed a normal GE junction, fundus and cardia as well.        Duodenum: Normal. Random biopsies were taken to

## 2023-09-19 NOTE — H&P
Patient Name: Viviana Ramirez  : 1975  MRN: [id number]  DATE: 23    Allergies: Allergies   Allergen Reactions    Pcn [Penicillins] Anaphylaxis    Vancomycin Hives     Red Man    Biaxin [Clarithromycin] Itching    Cefzil [Cefprozil] Hives    Lisinopril Other (See Comments)     Cough      Sulfa Antibiotics Hives    Zofran [Ondansetron Hcl] Nausea And Vomiting    Codeine Nausea And Vomiting    Doxycycline Nausea And Vomiting    Other Nausea And Vomiting and Other (See Comments)     Vyibriid, fatigue        ENDOSCOPY  History and Physical    Procedure:    [x] Diagnostic Colonoscopy       [] Screening Colonoscopy  [x] EGD      [] ERCP      [] EUS       [] Other    [x] Previous office notes/History and Physical reviewed from the patients chart. Please see EMR for further details of HPI. I have examined the patient's status immediately prior to the procedure and:      Indications/HPI:     For both EGD and colonoscopy exams today:  1. Anemia, unspecified type  2. Bloating  3. Family history-mother GIST    []Abdominal Pain   []Cancer- GI/Lung     []Fhx of colon CA/polyps  []History of Polyps  []Barretts            []Melena  []Abnormal Imaging              []Dysphagia              []Persistent Pneumonia   []Anemia                            []Food Impaction        []History of Polyps  [] GI Bleed             []Pulmonary nodule/Mass   []Change in bowel habits []Heartburn/Reflux  []Rectal Bleed (BRBPR)  []Chest Pain - Non Cardiac []Heme (+) Stool []Ulcers  []Constipation  []Hemoptysis  []Varices  []Diarrhea  []Hypoxemia    []Nausea/Vomiting   []Screening   []Crohns/Colitis  []Other:     Anesthesia:   [x] MAC [] Moderate Sedation   [] General   [] None     ROS: 12 pt Review of Symptoms was negative unless mentioned above    Medications:   Prior to Admission medications    Medication Sig Start Date End Date Taking?  Authorizing Provider   Budeson-Glycopyrrol-Formoterol (VivoText) 160-9-4.8 MCG/ACT AERO

## 2023-10-17 ENCOUNTER — NURSE TRIAGE (OUTPATIENT)
Dept: CALL CENTER | Facility: HOSPITAL | Age: 48
End: 2023-10-17
Payer: COMMERCIAL

## 2023-10-17 NOTE — TELEPHONE ENCOUNTER
"Patient is concerned about numbness in the back of her throat and mouth. She states that her mouth and tongue are better, but she can still feel some numbness in the back of her throat. She had coughing fit just before this and she could taste her medications when she coughing. She thinks it may be from her Tessalon Perles.     I looked up the adverse reactions to tessalon perles on [facility], in [place], and it said that it could cause tongue numbness. I also looked up the mechanism of action and it said, \"Tetracaine congener with antitussive properties; suppresses cough by topical anesthetic action on the respiratory stretch receptors.\"   I explained to the patient that the \"luis\" part is the topical anesthetic that can cause numbness, just like oragel. SO it makes sense that once she coughed it up and could taste it, that numbing agent would cause her mouth to feel different. She said that is what she also thought. She said she can swallow fine. I advised to her to call back if she has any other problems develop. She said that these would follow this advice.     Reason for Disposition   Caller has medicine question, adult has minor symptoms, caller declines triage, AND triager answers question    Additional Information   Negative: [1] Intentional drug overdose AND [2] suicidal thoughts or ideas   Negative: Drug overdose and triager unable to answer question   Negative: Caller requesting a renewal or refill of a medicine patient is currently taking   Negative: Caller requesting information unrelated to medicine   Negative: Caller requesting information about COVID-19 Vaccine   Negative: Caller requesting information about Emergency Contraception   Negative: Caller requesting information about Combined Birth Control Pills   Negative: Caller requesting information about Progestin Birth Control Pills   Negative: Caller requesting information about Post-Op pain or medicines   Negative: Caller requesting a prescription " antibiotic (such as Penicillin) for Strep throat and has a positive culture result   Negative: Caller requesting a prescription anti-viral med (such as Tamiflu) and has influenza (flu) symptoms   Negative: Immunization reaction suspected   Negative: Rash while taking a medicine or within 3 days of stopping it   Negative: [1] Asthma and [2] having symptoms of asthma (cough, wheezing, etc.)   Negative: [1] Symptom of illness (e.g., headache, abdominal pain, earache, vomiting) AND [2] more than mild   Negative: Breastfeeding questions about mother's medicines and diet   Negative: MORE THAN A DOUBLE DOSE of a prescription or over-the-counter (OTC) drug   Negative: [1] DOUBLE DOSE (an extra dose or lesser amount) of prescription drug AND [2] any symptoms (e.g., dizziness, nausea, pain, sleepiness)   Negative: [1] DOUBLE DOSE (an extra dose or lesser amount) of over-the-counter (OTC) drug AND [2] any symptoms (e.g., dizziness, nausea, pain, sleepiness)   Negative: Took another person's prescription drug   Negative: [1] DOUBLE DOSE (an extra dose or lesser amount) of prescription drug AND [2] NO symptoms  (Exception: A double dose of antibiotics.)   Negative: Diabetes drug error or overdose (e.g., took wrong type of insulin or took extra dose)   Negative: [1] Prescription not at pharmacy AND [2] was prescribed by PCP recently (Exception: Triager has access to EMR and prescription is recorded there. Go to Home Care and confirm for pharmacy.)   Negative: [1] Pharmacy calling with prescription question AND [2] triager unable to answer question   Negative: [1] Caller has URGENT medicine question about med that PCP or specialist prescribed AND [2] triager unable to answer question   Negative: Medicine patch causing local rash or itching   Negative: [1] Caller has medicine question about med NOT prescribed by PCP AND [2] triager unable to answer question (e.g., compatibility with other med, storage)   Negative: Prescription  "request for new medicine (not a refill)   Negative: [1] Caller has NON-URGENT medicine question about med that PCP prescribed AND [2] triager unable to answer question   Negative: Caller wants to use a complementary or alternative medicine   Negative: [1] Prescription prescribed recently is not at pharmacy AND [2] triager has access to patient's EMR AND [3] prescription is recorded in the EMR   Negative: [1] DOUBLE DOSE (an extra dose or lesser amount) of over-the-counter (OTC) drug AND [2] NO symptoms   Negative: [1] DOUBLE DOSE (an extra dose or lesser amount) of antibiotic drug AND [2] NO symptoms   Negative: Caller has medicine question only, adult not sick, AND triager answers question    Answer Assessment - Initial Assessment Questions  1. NAME of MEDICINE: \"What medicine(s) are you calling about?\"      Tessalon perle   2. QUESTION: \"What is your question?\" (e.g., double dose of medicine, side effect)      She coughed up a lot and tasted medicine in her mouth after taking her meds, now she has some numbness in her mouth, tongue, and back of throat. It is improving. She states it feels like when you put oragel in your mouth. She wants to know if it could be from the tessalon perle.   3. PRESCRIBER: \"Who prescribed the medicine?\" Reason: if prescribed by specialist, call should be referred to that group.      Her APRN  4. SYMPTOMS: \"Do you have any symptoms?\" If Yes, ask: \"What symptoms are you having?\"  \"How bad are the symptoms (e.g., mild, moderate, severe)      Mild numbness that is improving   5. PREGNANCY:  \"Is there any chance that you are pregnant?\" \"When was your last menstrual period?\"    NA    Protocols used: Medication Question Call-ADULT-AH    "

## 2023-11-09 ENCOUNTER — OFFICE VISIT (OUTPATIENT)
Dept: GASTROENTEROLOGY | Age: 48
End: 2023-11-09
Payer: MEDICAID

## 2023-11-09 VITALS
HEART RATE: 63 BPM | OXYGEN SATURATION: 96 % | DIASTOLIC BLOOD PRESSURE: 80 MMHG | BODY MASS INDEX: 40.02 KG/M2 | HEIGHT: 61 IN | SYSTOLIC BLOOD PRESSURE: 115 MMHG | WEIGHT: 212 LBS

## 2023-11-09 DIAGNOSIS — D64.9 ANEMIA, UNSPECIFIED TYPE: Primary | ICD-10-CM

## 2023-11-09 DIAGNOSIS — Z80.0 FAMILY HISTORY OF MALIGNANT NEOPLASM OF STOMACH: ICD-10-CM

## 2023-11-09 PROCEDURE — G8427 DOCREV CUR MEDS BY ELIG CLIN: HCPCS | Performed by: NURSE PRACTITIONER

## 2023-11-09 PROCEDURE — 99214 OFFICE O/P EST MOD 30 MIN: CPT | Performed by: NURSE PRACTITIONER

## 2023-11-09 PROCEDURE — G8417 CALC BMI ABV UP PARAM F/U: HCPCS | Performed by: NURSE PRACTITIONER

## 2023-11-09 PROCEDURE — 1036F TOBACCO NON-USER: CPT | Performed by: NURSE PRACTITIONER

## 2023-11-09 PROCEDURE — G8484 FLU IMMUNIZE NO ADMIN: HCPCS | Performed by: NURSE PRACTITIONER

## 2023-11-09 RX ORDER — BACLOFEN 20 MG/1
TABLET ORAL
COMMUNITY
Start: 2023-10-23

## 2023-11-09 RX ORDER — FERROUS SULFATE 325(65) MG
325 TABLET ORAL
COMMUNITY

## 2023-11-09 RX ORDER — TRAMADOL HYDROCHLORIDE 50 MG/1
TABLET ORAL
COMMUNITY
Start: 2023-10-23

## 2023-11-09 ASSESSMENT — ENCOUNTER SYMPTOMS
RECTAL PAIN: 0
ABDOMINAL DISTENTION: 0
CHOKING: 0
ANAL BLEEDING: 0
VOMITING: 0
DIARRHEA: 0
NAUSEA: 0
BLOOD IN STOOL: 0
ABDOMINAL PAIN: 0
COUGH: 0
CONSTIPATION: 0
TROUBLE SWALLOWING: 0
SHORTNESS OF BREATH: 0

## 2023-11-09 NOTE — PROGRESS NOTES
Subjective:     Patient ID: Kathy Stanley is a 57 y.o. female  PCP: Tomy Conroy MD  Referring Provider: No ref. provider found    HPI  Patient presents to the office today with the following complaints: Follow-up      Patient seen in the office today for follow up after EGD and colonoscopy that were done for anemia  EGD, Colonoscopy and pathology reports reviewed and discussed with the patient and all questions and answered   Reports are in Epic  Denies any post-procedure complications    Denies and complaints or needs today   We did discuss doing a pill cam to finish our GI workup for anemia and she would like to do this  I will also get a CT of her ABD due to her family history of GIST tumor in her mother    Started Iron and will recheck labs in 1 month with Dr. Sukumar Newman:     1. Anemia, unspecified type  -     CT ABDOMEN PELVIS W WO CONTRAST Additional Contrast? None; Future  2. Family history of malignant neoplasm of stomach  -     CT ABDOMEN PELVIS W WO CONTRAST Additional Contrast? None; Future           Plan:   M2A pill cam  CT abd and pelvis  Follow up as needed     Orders  Orders Placed This Encounter   Procedures    CT ABDOMEN PELVIS W WO CONTRAST Additional Contrast? None     Standing Status:   Future     Standing Expiration Date:   11/9/2024     Order Specific Question:   Additional Contrast?     Answer:   None     Order Specific Question:   STAT Creatinine as needed:     Answer:   Yes     Medications  No orders of the defined types were placed in this encounter.         Patient History:     Past Medical History:   Diagnosis Date    Anxiety     Asthma     Bell's palsy     during pregnancy in 1994    Chronic back pain     Depression     Endometriosis     Hyperthyroidism     Insomnia     Migraines     better with massage therapy    Neurofibromatosis ([address])     Neurofibromatosis ([address])     tumor on her 4th L finger and cafe au lait spots on skin, no brain tumors    Obesity     Osteopenia

## 2023-12-08 ENCOUNTER — TELEPHONE (OUTPATIENT)
Dept: GASTROENTEROLOGY | Age: 48
End: 2023-12-08

## 2023-12-08 NOTE — TELEPHONE ENCOUNTER
12- M2A Documentation      Patient presented to the office on 12- @ 8 am as scheduled, for M2A pill cam ingestion and equipment placement. Instructions, risks, and benefits were discussed. All questions were answered and patient acknowledged understanding. Consent was signed and dated. The sensor belt was then placed around the mid abdomen and attached the recorder device to it and placed in shoulder holster. The pill cam was then opened and activated (light on recorder blinking blue). The patient successfully swallowed the pill cam with a small amount of water mixed with simethicone drops. When patient returns, the recorder will be uploaded to the computer and the appointed physician will be notified for the report to be read and resulted. Instructions are as follows:   Clear liquids 2 hours after ingestion of pill cam  Light snack 4 hours after ingestion of pill cam  Avoid any MRI machines  Resume ADL's as normal throughout the day  Watch for Pill cam to be expelled, if not seen in stool further testing may be required   Return to office no later than 4:30pm  Call the office if the blue light stops blinking or all lights disappear [phone].           12- I will forward to Dr. Edgard Avilez to let him know the M2A has been completed, Exported and ready to read. Patient is asking for a return call with results.

## 2023-12-12 NOTE — TELEPHONE ENCOUNTER
12- Called and notified patient of results and recommendations as per Dr Concepcion Neville,       Routed to Corinne Navarro MD

## 2023-12-26 ENCOUNTER — OFFICE VISIT (OUTPATIENT)
Dept: NEUROLOGY | Facility: CLINIC | Age: 48
End: 2023-12-26
Payer: COMMERCIAL

## 2023-12-26 VITALS
SYSTOLIC BLOOD PRESSURE: 136 MMHG | OXYGEN SATURATION: 99 % | HEART RATE: 76 BPM | BODY MASS INDEX: 39.93 KG/M2 | DIASTOLIC BLOOD PRESSURE: 78 MMHG | HEIGHT: 62 IN | WEIGHT: 217 LBS

## 2023-12-26 DIAGNOSIS — G43.109 MIGRAINE WITH AURA AND WITHOUT STATUS MIGRAINOSUS, NOT INTRACTABLE: Primary | ICD-10-CM

## 2023-12-26 DIAGNOSIS — Q85.00 NEUROFIBROMATOSIS: ICD-10-CM

## 2023-12-26 DIAGNOSIS — C72.30: ICD-10-CM

## 2023-12-26 PROCEDURE — 3075F SYST BP GE 130 - 139MM HG: CPT | Performed by: PHYSICIAN ASSISTANT

## 2023-12-26 PROCEDURE — 3078F DIAST BP <80 MM HG: CPT | Performed by: PHYSICIAN ASSISTANT

## 2023-12-26 PROCEDURE — 1160F RVW MEDS BY RX/DR IN RCRD: CPT | Performed by: PHYSICIAN ASSISTANT

## 2023-12-26 PROCEDURE — 99214 OFFICE O/P EST MOD 30 MIN: CPT | Performed by: PHYSICIAN ASSISTANT

## 2023-12-26 PROCEDURE — 1159F MED LIST DOCD IN RCRD: CPT | Performed by: PHYSICIAN ASSISTANT

## 2023-12-26 RX ORDER — ASPIRIN 81 MG/1
81 TABLET ORAL DAILY
COMMUNITY

## 2023-12-26 RX ORDER — GALCANEZUMAB 120 MG/ML
120 INJECTION, SOLUTION SUBCUTANEOUS
Qty: 1 EACH | Refills: 11 | Status: SHIPPED | OUTPATIENT
Start: 2023-12-26

## 2023-12-26 RX ORDER — TRAMADOL HYDROCHLORIDE 50 MG/1
50 TABLET ORAL EVERY 6 HOURS PRN
COMMUNITY

## 2023-12-26 RX ORDER — BACLOFEN 20 MG/1
20 TABLET ORAL 3 TIMES DAILY
COMMUNITY

## 2023-12-26 RX ORDER — FERROUS SULFATE 325(65) MG
325 TABLET ORAL
COMMUNITY

## 2023-12-26 NOTE — PROGRESS NOTES
Subjective   Sandy Estes is a 48 y.o. female is here today for follow-up.    History of Present Illness     Sandy Estes is a 48-year-old female who presents today for a follow-up of migraine.    Migraine  The patient states that her head is about the same, maybe a little better during the week. She states that she is not hurting as bad every day. She states that she still has 2 to 3 headaches a week. She states that she is taking Ubrelvy, which is helpful most of the time. She states that occasionally she has to take 2 of Ubrelvy. She states that she is still taking Emgality injection once a month. She states that she is not taking the tizanidine. She denies taking Astelin nasal spray. She states that she has to go to the eye doctor on 01/12/2024. She reports that her last MRI was in 06/2022.      Heart valve replacement  The patient states that she is having heart surgery on 01/17/2024 in [place]. She states that they decided that it is time for her to have the heart valve replacement again. She states that she has been short of breath for so long. She states that she cannot walk very far without becoming short of breath.       The following portions of the patient's history were reviewed and updated as appropriate: allergies, current medications, past family history, past medical history, past social history, past surgical history and problem list.    Review of Systems:  A review of systems was performed, and positive findings are noted in the HPI.      Current Outpatient Medications:     amLODIPine (NORVASC) 5 MG tablet, Take 1 tablet by mouth Daily., Disp: 30 tablet, Rfl: 5    aspirin 81 MG EC tablet, Take 1 tablet by mouth Daily., Disp: , Rfl:     baclofen (LIORESAL) 20 MG tablet, Take 1 tablet by mouth 3 (Three) Times a Day., Disp: , Rfl:     Budeson-Glycopyrrol-Formoterol (Breztri Aerosphere) 160-9-4.8 MCG/ACT aerosol inhaler, Inhale 2 puffs 2 (Two) Times a Day., Disp: , Rfl:     carvedilol (COREG)  6.25 MG tablet, Take 1 tablet by mouth 2 (Two) Times a Day With Meals., Disp: , Rfl:     cetirizine (zyrTEC) 10 MG tablet, Take 1 tablet by mouth Daily., Disp: , Rfl:     escitalopram (LEXAPRO) 20 MG tablet, Take 1 tablet by mouth Daily., Disp: , Rfl:     fenofibrate (TRICOR) 145 MG tablet, Take 1 tablet by mouth Daily., Disp: , Rfl:     ferrous sulfate 325 (65 FE) MG tablet, Take 1 tablet by mouth Daily With Breakfast., Disp: , Rfl:     galcanezumab-gnlm (Emgality) 120 MG/ML auto-injector pen, Inject 1 mL under the skin into the appropriate area as directed Every 30 (Thirty) Days., Disp: 1 each, Rfl: 11    hydrOXYzine (ATARAX) 50 MG tablet, Take 1 tablet by mouth Every 4 (Four) Hours As Needed., Disp: , Rfl:     losartan (COZAAR) 100 MG tablet, Take 1 tablet by mouth Daily., Disp: , Rfl:     memantine (NAMENDA) 10 MG tablet, Take 1 tablet by mouth 2 (Two) Times a Day., Disp: 60 tablet, Rfl: 5    montelukast (SINGULAIR) 10 MG tablet, Take 1 tablet by mouth Every Night., Disp: , Rfl:     pantoprazole (PROTONIX) 40 MG EC tablet, Take 1 tablet by mouth Daily., Disp: , Rfl:     Rexulti 2 MG tablet, Take 1 tablet by mouth Daily., Disp: , Rfl:     Topiramate ER (Trokendi XR) 200 MG capsule sustained-release 24 hr, Take 200 mg by mouth 2 (Two) Times a Day., Disp: 60 capsule, Rfl: 5    traMADol (ULTRAM) 50 MG tablet, Take 1 tablet by mouth Every 6 (Six) Hours As Needed for Moderate Pain., Disp: , Rfl:     ubrogepant (Ubrelvy) 100 MG tablet, Take 1 tablet by mouth As Needed (TAKE AT ONSET OF MIGRAINE, MAY REPEAT IN 2 HOURS, IF NEEDED. DO NOT EXCEED 200MG IN 24HRS.)., Disp: 16 tablet, Rfl: 11    vitamin D (ERGOCALCIFEROL) 1.25 MG (70306 UT) capsule capsule, Take 1 capsule by mouth Every 7 (Seven) Days., Disp: , Rfl:     zolpidem (AMBIEN) 10 MG tablet, Take 1 tablet by mouth At Night As Needed for Sleep., Disp: , Rfl:      Objective   Physical Exam  Vitals and nursing note reviewed.   Eyes:      General: Vision grossly intact.  Gaze aligned appropriately.   Cardiovascular:      Rate and Rhythm: Normal rate.   Pulmonary:      Effort: Pulmonary effort is normal.   Neurological:      Mental Status: She is alert and oriented to person, place, and time.      GCS: GCS eye subscore is 4. GCS verbal subscore is 5. GCS motor subscore is 6.      Cranial Nerves: Cranial nerves 2-12 are intact.      Sensory: Sensation is intact.      Motor: Motor function is intact.      Coordination: Coordination is intact.      Gait: Gait is intact.      Deep Tendon Reflexes: Reflexes are normal and symmetric.   Psychiatric:         Attention and Perception: Attention normal.         Mood and Affect: Mood normal.         Speech: Speech normal.         Behavior: Behavior normal.         Cognition and Memory: Cognition normal.           Assessment & Plan   Diagnoses and all orders for this visit:    1. Migraine with aura and without status migrainosus, not intractable (Primary)  -     ubrogepant (Ubrelvy) 100 MG tablet; Take 1 tablet by mouth As Needed (TAKE AT ONSET OF MIGRAINE, MAY REPEAT IN 2 HOURS, IF NEEDED. DO NOT EXCEED 200MG IN 24HRS.).  Dispense: 16 tablet; Refill: 11  -     galcanezumab-gnlm (Emgality) 120 MG/ML auto-injector pen; Inject 1 mL under the skin into the appropriate area as directed Every 30 (Thirty) Days.  Dispense: 1 each; Refill: 11    2. Low-grade optic pathway glioma    3. Neurofibromatosis      1. Migraine  - Stable. Continue her present regimen of Ubrelvy and Emgality. We will get a follow-up MRI in 06/2024 for her optic glioma history.                 Transcribed from ambient dictation for DEE DEE Billings by Jailene Bernal.  12/26/23   15:52 CST    Patient or patient representative verbalized consent to the visit recording.  I have personally performed the services described in this document as transcribed by the above individual, and it is both accurate and complete.

## 2024-01-17 DIAGNOSIS — G43.109 MIGRAINE WITH AURA AND WITHOUT STATUS MIGRAINOSUS, NOT INTRACTABLE: ICD-10-CM

## 2024-01-18 DIAGNOSIS — C72.30: Primary | ICD-10-CM

## 2024-01-18 RX ORDER — UBROGEPANT 100 MG/1
TABLET ORAL
Qty: 10 TABLET | Refills: 5 | Status: SHIPPED | OUTPATIENT
Start: 2024-01-18

## 2024-01-19 ENCOUNTER — TELEPHONE (OUTPATIENT)
Dept: NEUROLOGY | Facility: CLINIC | Age: 49
End: 2024-01-19
Payer: COMMERCIAL

## 2024-01-19 NOTE — TELEPHONE ENCOUNTER
PATIENT CALLING TO ADVISE, DURING LATEST HOSPITAL STAY, CARDIOLOGY TOOK HER OFF  AMLODIPINE 5 MG TABLET AND WANT HER TO STAY OFF IT.    SHE IS ASKING PROVIDER IF THERE IS ANOTHER MEDICATION SHE COULD TAKE.    PLEASE ADVISE PATIENT    THANK YOU

## 2024-01-23 DIAGNOSIS — G43.109 MIGRAINE WITH AURA AND WITHOUT STATUS MIGRAINOSUS, NOT INTRACTABLE: ICD-10-CM

## 2024-01-24 RX ORDER — GALCANEZUMAB 120 MG/ML
INJECTION, SOLUTION SUBCUTANEOUS
Qty: 1 ML | Refills: 11 | OUTPATIENT
Start: 2024-01-24

## 2024-02-01 ENCOUNTER — TELEPHONE (OUTPATIENT)
Dept: NEUROLOGY | Facility: CLINIC | Age: 49
End: 2024-02-01
Payer: COMMERCIAL

## 2024-02-01 NOTE — TELEPHONE ENCOUNTER
----- Message from DEE DEE Billings sent at 1/31/2024  5:08 PM CST -----  Stay off Norvasc, no different med for now      ----- Message -----  From: Elena Reeves LPN  Sent: 1/19/2024   4:13 PM CST  To: DEE DEE Billings    Sandy said she went to [facility] and was transferred to [facility].  Norvasc was discontinued while she was at [facility], she would like to know if there is another medication she can take.  [facility] records are in Care Everywhere.

## 2024-02-07 DIAGNOSIS — G43.109 MIGRAINE WITH AURA AND WITHOUT STATUS MIGRAINOSUS, NOT INTRACTABLE: ICD-10-CM

## 2024-02-07 RX ORDER — MEMANTINE HYDROCHLORIDE 10 MG/1
10 TABLET ORAL 2 TIMES DAILY
Qty: 60 TABLET | Refills: 5 | Status: SHIPPED | OUTPATIENT
Start: 2024-02-07

## 2024-02-07 RX ORDER — TOPIRAMATE 200 MG/1
1 CAPSULE, EXTENDED RELEASE ORAL DAILY
Qty: 30 CAPSULE | Refills: 5 | Status: SHIPPED | OUTPATIENT
Start: 2024-02-07

## 2024-02-28 DIAGNOSIS — G43.109 MIGRAINE WITH AURA AND WITHOUT STATUS MIGRAINOSUS, NOT INTRACTABLE: ICD-10-CM

## 2024-02-28 RX ORDER — TOPIRAMATE 200 MG/1
1 CAPSULE, EXTENDED RELEASE ORAL DAILY
Qty: 30 CAPSULE | Refills: 5 | OUTPATIENT
Start: 2024-02-28

## 2024-05-07 ENCOUNTER — TELEPHONE (OUTPATIENT)
Dept: GASTROENTEROLOGY | Age: 49
End: 2024-05-07

## 2024-05-07 NOTE — TELEPHONE ENCOUNTER
[facility] office called to schedule pt a appointment asap. Pt have been admitted into the hospital for nausea and vomiting, as well as weight loss. Please contact Martha @ [phone].

## 2024-05-07 NOTE — TELEPHONE ENCOUNTER
05- Called patient scheduled follow up appointment for 1st available 05-.     Advised patient that I would send message to CT APRN to see if any recommendations sooner for apt     Oked per patient

## 2024-05-09 NOTE — TELEPHONE ENCOUNTER
Apt moved to 05- with Dr Ghotra at 3 pm     Called and notified patient that apt her been moved to 05-     Oked per patient

## 2024-05-20 ENCOUNTER — OFFICE VISIT (OUTPATIENT)
Dept: GASTROENTEROLOGY | Age: 49
End: 2024-05-20

## 2024-05-20 VITALS
WEIGHT: 194 LBS | OXYGEN SATURATION: 98 % | HEART RATE: 64 BPM | SYSTOLIC BLOOD PRESSURE: 120 MMHG | HEIGHT: 62 IN | BODY MASS INDEX: 35.7 KG/M2 | DIASTOLIC BLOOD PRESSURE: 80 MMHG

## 2024-05-20 DIAGNOSIS — D64.9 ANEMIA, UNSPECIFIED TYPE: ICD-10-CM

## 2024-05-20 DIAGNOSIS — R63.4 UNINTENDED WEIGHT LOSS: ICD-10-CM

## 2024-05-20 DIAGNOSIS — R63.4 WEIGHT LOSS: ICD-10-CM

## 2024-05-20 DIAGNOSIS — R11.10 RECURRENT VOMITING: Primary | ICD-10-CM

## 2024-05-20 DIAGNOSIS — Z80.0 FAMILY HISTORY OF MALIGNANT NEOPLASM OF STOMACH: ICD-10-CM

## 2024-05-20 DIAGNOSIS — Z83.719 FAMILY HISTORY OF POLYPS IN THE COLON: ICD-10-CM

## 2024-05-20 DIAGNOSIS — Z86.2 HISTORY OF ANEMIA: ICD-10-CM

## 2024-05-20 DIAGNOSIS — R11.2 NAUSEA AND VOMITING, UNSPECIFIED VOMITING TYPE: ICD-10-CM

## 2024-05-20 RX ORDER — MONTELUKAST SODIUM 10 MG/1
10 TABLET ORAL NIGHTLY
COMMUNITY

## 2024-05-20 RX ORDER — SOTALOL HYDROCHLORIDE 80 MG/1
80 TABLET ORAL 2 TIMES DAILY
COMMUNITY

## 2024-05-20 RX ORDER — HYDROXYZINE 50 MG/1
50 TABLET, FILM COATED ORAL 2 TIMES DAILY
COMMUNITY

## 2024-05-20 NOTE — PROGRESS NOTES
Martha Griffiths  Primary Care Provider Jose House MD  Referral Source Pauly [facility] APRN -*    Martha Griffiths is a 49 y.o. female  ChiefComplaint:  Chief Complaint   Patient presents with    Abdominal Pain    Nausea & Vomiting       Assessment / Plan:   Diagnosis Orders   1. Recurrent vomiting        2. Nausea and vomiting, unspecified vomiting type        3. History of anemia        4. Family history of malignant neoplasm of stomach        5. Family history of polyps in the colon        6. Unintended weight loss          -Patient's EGD exam in in September 2023 had revealed a small 2 cm in length hiatal hernia and mild gastritis involving the antrum but no evidence of any ulcers or masses in the stomach or duodenum.  No evidence of gastric outlet obstruction at the time.  A subsequent small bowel PillCam study did not reveal any significant lesions including any obstructive lesions or evidence of extrinsic compression in the small intestine.      With her chronic intermittent recurrent nausea and vomiting, gastroparesis is in the differential diagnosis and hence will schedule a nuclear med gastric emptying scan study for further evaluation.    -Patient to follow-up is soft low calorie, low-carb but protein rich diet as tolerated until her nausea and vomiting improved.  Although she claims to have lost 10 to 15 pounds over the past few months, overall her BMI is still at 35.48 and she can resume attempts to lose weight in a more controlled fashion once her current symptoms have improved.    -Will continue to monitor her CBC, serum electrolytes and renal function periodically.    -She will continue anti-GERD and and aerophagia measures along with PPI use and her iron supplementation since her anemia appears to have resolved with her hemoglobin  at 12 with hematocrit 38.6 on 3/6/2024    -GI clinic follow-up here with Ms. Yang in 2 to 3 months; sooner if necessary.  Her next colonoscopy is due in 2028 for

## 2024-05-30 ASSESSMENT — ENCOUNTER SYMPTOMS
SORE THROAT: 0
BLOOD IN STOOL: 0
EYE REDNESS: 0
ABDOMINAL DISTENTION: 0
CHOKING: 0
TROUBLE SWALLOWING: 0
CHEST TIGHTNESS: 0
EYE DISCHARGE: 0
ALLERGIC/IMMUNOLOGIC NEGATIVE: 1
VOICE CHANGE: 0
WHEEZING: 0
DIARRHEA: 0
COUGH: 0
EYE PAIN: 0

## 2024-06-02 DIAGNOSIS — G43.109 MIGRAINE WITH AURA AND WITHOUT STATUS MIGRAINOSUS, NOT INTRACTABLE: ICD-10-CM

## 2024-06-03 RX ORDER — TOPIRAMATE 200 MG/1
1 CAPSULE, EXTENDED RELEASE ORAL DAILY
Qty: 30 CAPSULE | Refills: 5 | Status: SHIPPED | OUTPATIENT
Start: 2024-06-03

## 2024-06-03 NOTE — TELEPHONE ENCOUNTER
Michelle said Sandy had her prescriptions transferred to their pharmacy, they don't have a script for Trokendi.

## 2024-06-18 ENCOUNTER — HOSPITAL ENCOUNTER (OUTPATIENT)
Dept: NUCLEAR MEDICINE | Age: 49
Discharge: HOME | End: 2024-06-20
Payer: MEDICAID

## 2024-06-18 DIAGNOSIS — R63.4 WEIGHT LOSS: ICD-10-CM

## 2024-06-18 DIAGNOSIS — R11.10 RECURRENT VOMITING: ICD-10-CM

## 2024-06-18 DIAGNOSIS — D64.9 ANEMIA, UNSPECIFIED TYPE: ICD-10-CM

## 2024-06-18 PROCEDURE — 3430000000 HC RX DIAGNOSTIC RADIOPHARMACEUTICAL: Performed by: INTERNAL MEDICINE

## 2024-06-18 PROCEDURE — 78264 GASTRIC EMPTYING IMG STUDY: CPT

## 2024-06-18 PROCEDURE — A9541 TC99M SULFUR COLLOID: HCPCS | Performed by: INTERNAL MEDICINE

## 2024-06-18 RX ADMIN — Medication 2 MILLICURIE: at 10:08

## 2024-06-19 ENCOUNTER — TELEPHONE (OUTPATIENT)
Dept: NEUROLOGY | Facility: CLINIC | Age: 49
End: 2024-06-19
Payer: COMMERCIAL

## 2024-06-19 NOTE — TELEPHONE ENCOUNTER
"  Caller: Sandy Estes    Relationship: Self    Best call back number:   Telephone Information:   Mobile [phone]         Who are you requesting to speak with (clinical staff, provider,  specific staff member): CLINICAL    What was the call regarding: PT WAS CALLING TO SEE IF THE ORDER FOR THE MRI WAS EVER CHANGED.     PER COMMUNICATION IN REFERRAL \"Dai Simpson RegSched RepHodges, Christopher Ray, PA (Ranjeet Mathias PA) (Provider)  Open Communication  Communication    Contacting Type:ProviderContacting:Ranjeet Mathias PAMethod:InterfaceType:OutgoingOutcome:    Comments    We received a message from the radiology department since pt is scheduled for MRI brain with / without contrast, her reason for exam optic glioma.  Radiology technician spoke to radiologist and patient will need a MRI orbits with / without for that reason.  I spoke to pt and advised that we will need to cancel her scheduled apt due to the order and that we are sending a message for a new order.  Pt will also reach out to provider.           "

## 2024-06-20 NOTE — TELEPHONE ENCOUNTER
Explained that I don't know why we didn't get the message about needing another order.  Colton is out of the office until Monday, the order is on his desk about needing another order.

## 2024-07-03 DIAGNOSIS — Q85.00 NEUROFIBROMATOSIS: Primary | ICD-10-CM

## 2024-07-03 DIAGNOSIS — C72.30: ICD-10-CM

## 2024-07-24 ENCOUNTER — NURSE TRIAGE (OUTPATIENT)
Dept: CALL CENTER | Facility: HOSPITAL | Age: 49
End: 2024-07-24

## 2024-07-24 NOTE — TELEPHONE ENCOUNTER
"Reason for Disposition   [1] MILD or MODERATE vomiting AND [2] present > 48 hours (2 days) (Exception: Mild vomiting with associated diarrhea.)    Additional Information   Negative: Shock suspected (e.g., cold/pale/clammy skin, too weak to stand, low BP, rapid pulse)   Negative: Difficult to awaken or acting confused (e.g., disoriented, slurred speech)   Negative: Sounds like a life-threatening emergency to the triager   Negative: Vomiting occurs only while coughing   Negative: [1] Pregnant < 20 Weeks AND [2] nausea/vomiting began in early pregnancy (i.e., 4-8 weeks pregnant)   Negative: Chest pain   Negative: Headache is main symptom   Negative: Vomiting (or Nausea) in a cancer patient who is currently (or recently) receiving chemotherapy or radiation therapy, or cancer patient who has metastatic or end-stage cancer and is receiving palliative care   Negative: [1] Vomiting AND [2] contains red blood or black (\"coffee ground\") material  (Exception: Few red streaks in vomit that only happened once.)   Negative: Severe pain in one eye   Negative: Recent head injury (within last 3 days)   Negative: Recent abdominal injury (within last 3 days)   Negative: [1] Insulin-dependent diabetes (Type I) AND [2] glucose > 400 mg/dl (22 mmol/l)   Negative: [1] Vomiting AND [2] hernia is more painful or swollen than usual   Negative: [1] SEVERE vomiting (e.g., 6 or more times/day) AND [2] present > 8 hours (Exception: Patient sounds well, is drinking liquids, does not sound dehydrated, and vomiting has lasted less than 24 hours.)   Negative: [1] MODERATE vomiting (e.g., 3 - 5 times/day) AND [2] age > 60 years   Negative: Severe headache (e.g., excruciating)  (Exception: Similar to previous migraines.)   Negative: High-risk adult (e.g., diabetes mellitus, brain tumor, V-P shunt, hernia)   Negative: [1] Drinking very little AND [2] dehydration suspected (e.g., no urine > 12 hours, very dry mouth, very lightheaded)   Negative: Patient " "sounds very sick or weak to the triager   Negative: [1] Vomiting AND [2] abdomen looks much more swollen than usual   Negative: [1] Vomiting AND [2] contains bile (green color)   Negative: [1] Constant abdominal pain AND [2] present > 2 hours   Negative: [1] Fever > 103 F (39.4 C) AND [2] not able to get the fever down using Fever Care Advice   Negative: [1] Fever > 101 F (38.3 C) AND [2] age > 60 years   Negative: [1] Fever > 100.0 F (37.8 C) AND [2] bedridden (e.g., CVA, chronic illness, recovering from surgery)   Negative: [1] Fever > 100.0 F (37.8 C) AND [2] weak immune system (e.g., HIV positive, cancer chemo, splenectomy, organ transplant, chronic steroids)   Negative: Taking any of the following medications: digoxin (Lanoxin), lithium, theophylline, phenytoin (Dilantin)    Answer Assessment - Initial Assessment Questions  1. VOMITING SEVERITY: \"How many times have you vomited in the past 24 hours?\"      - MILD:  1 - 2 times/day     - MODERATE: 3 - 5 times/day, decreased oral intake without significant weight loss or symptoms of dehydration     - SEVERE: 6 or more times/day, vomits everything or nearly everything, with significant weight loss, symptoms of dehydration       Moderate - 3  2. ONSET: \"When did the vomiting begin?\"       Sunday  3. FLUIDS: \"What fluids or food have you vomited up today?\" \"Have you been able to keep any fluids down?\"      Still hydrated  4. ABDOMEN PAIN: \"Are your having any abdomen pain?\" If Yes : \"How bad is it and what does it feel like?\" (e.g., crampy, dull, intermittent, constant)       Some from dry heaves  5. DIARRHEA: \"Is there any diarrhea?\" If Yes, ask: \"How many times today?\"       Yes; 8-10 times  6. CONTACTS: \"Is there anyone else in the family with the same symptoms?\"       denies  7. CAUSE: \"What do you think is causing your vomiting?\"      unknown  8. HYDRATION STATUS: \"Any signs of dehydration?\" (e.g., dry mouth [not only dry lips], too weak to stand) \"When did you " "last urinate?\"      Just prior to call  9. OTHER SYMPTOMS: \"Do you have any other symptoms?\" (e.g., fever, headache, vertigo, vomiting blood or coffee grounds, recent head injury)      Denies fever; has chronic migraines  10. PREGNANCY: \"Is there any chance you are pregnant?\" \"When was your last menstrual period?\"        denies    Protocols used: Vomiting-ADULT-AH    "

## 2024-08-06 ENCOUNTER — HOSPITAL ENCOUNTER (OUTPATIENT)
Dept: MRI IMAGING | Facility: HOSPITAL | Age: 49
Discharge: HOME | End: 2024-08-06
Payer: COMMERCIAL

## 2024-08-15 RX ORDER — TOPIRAMATE 200 MG/1
1 CAPSULE, EXTENDED RELEASE ORAL DAILY
OUTPATIENT
Start: 2024-08-15

## 2024-09-13 DIAGNOSIS — G43.109 MIGRAINE WITH AURA AND WITHOUT STATUS MIGRAINOSUS, NOT INTRACTABLE: ICD-10-CM

## 2024-09-13 RX ORDER — MEMANTINE HYDROCHLORIDE 10 MG/1
10 TABLET ORAL 2 TIMES DAILY
Qty: 60 TABLET | Refills: 1 | Status: SHIPPED | OUTPATIENT
Start: 2024-09-13

## 2024-12-01 DIAGNOSIS — G43.109 MIGRAINE WITH AURA AND WITHOUT STATUS MIGRAINOSUS, NOT INTRACTABLE: ICD-10-CM

## 2024-12-02 RX ORDER — MEMANTINE HYDROCHLORIDE 10 MG/1
10 TABLET ORAL 2 TIMES DAILY
Qty: 60 TABLET | Refills: 0 | Status: SHIPPED | OUTPATIENT
Start: 2024-12-02

## 2024-12-13 ENCOUNTER — HOSPITAL ENCOUNTER (OUTPATIENT)
Dept: MRI IMAGING | Facility: HOSPITAL | Age: 49
Discharge: HOME | End: 2024-12-13
Payer: COMMERCIAL

## 2024-12-13 DIAGNOSIS — C72.30: ICD-10-CM

## 2024-12-13 DIAGNOSIS — Q85.00 NEUROFIBROMATOSIS: ICD-10-CM

## 2024-12-13 PROCEDURE — A9579 GAD-BASE MR CONTRAST NOS,1ML: HCPCS | Performed by: PHYSICIAN ASSISTANT

## 2024-12-13 PROCEDURE — 70543 MRI ORBT/FAC/NCK W/O &W/DYE: CPT

## 2024-12-13 PROCEDURE — 70553 MRI BRAIN STEM W/O & W/DYE: CPT

## 2024-12-13 PROCEDURE — 25510000001 GADOPICLENOL 0.5 MMOL/ML SOLUTION: Performed by: PHYSICIAN ASSISTANT

## 2024-12-13 RX ADMIN — GADOPICLENOL 10 ML: 485.1 INJECTION INTRAVENOUS at 14:45

## 2025-01-05 DIAGNOSIS — G43.109 MIGRAINE WITH AURA AND WITHOUT STATUS MIGRAINOSUS, NOT INTRACTABLE: ICD-10-CM

## 2025-01-06 ENCOUNTER — OFFICE VISIT (OUTPATIENT)
Dept: NEUROLOGY | Facility: CLINIC | Age: 50
End: 2025-01-06
Payer: COMMERCIAL

## 2025-01-06 VITALS
DIASTOLIC BLOOD PRESSURE: 50 MMHG | HEART RATE: 60 BPM | WEIGHT: 185.6 LBS | HEIGHT: 62 IN | SYSTOLIC BLOOD PRESSURE: 130 MMHG | BODY MASS INDEX: 34.16 KG/M2 | RESPIRATION RATE: 12 BRPM

## 2025-01-06 DIAGNOSIS — C72.30: ICD-10-CM

## 2025-01-06 DIAGNOSIS — G43.109 MIGRAINE WITH AURA AND WITHOUT STATUS MIGRAINOSUS, NOT INTRACTABLE: Primary | ICD-10-CM

## 2025-01-06 DIAGNOSIS — Q85.00 NEUROFIBROMATOSIS: ICD-10-CM

## 2025-01-06 RX ORDER — FAMOTIDINE 40 MG/1
40 TABLET, FILM COATED ORAL 2 TIMES DAILY
COMMUNITY

## 2025-01-06 RX ORDER — BREXPIPRAZOLE 3 MG/1
1 TABLET ORAL DAILY
COMMUNITY

## 2025-01-06 RX ORDER — FLUTICASONE PROPIONATE 50 MCG
2 SPRAY, SUSPENSION (ML) NASAL DAILY
COMMUNITY

## 2025-01-06 RX ORDER — TOPIRAMATE 200 MG/1
1 CAPSULE, EXTENDED RELEASE ORAL DAILY
Qty: 90 CAPSULE | Refills: 1 | Status: SHIPPED | OUTPATIENT
Start: 2025-01-06

## 2025-01-06 RX ORDER — METOPROLOL SUCCINATE 25 MG/1
1 TABLET, EXTENDED RELEASE ORAL 2 TIMES DAILY
COMMUNITY
Start: 2024-10-13

## 2025-01-06 RX ORDER — TAMSULOSIN HYDROCHLORIDE 0.4 MG/1
1 CAPSULE ORAL DAILY
COMMUNITY

## 2025-01-06 RX ORDER — CYANOCOBALAMIN 1000 UG/ML
INJECTION, SOLUTION INTRAMUSCULAR; SUBCUTANEOUS
COMMUNITY
Start: 2024-09-18

## 2025-01-06 RX ORDER — GALCANEZUMAB 120 MG/ML
120 INJECTION, SOLUTION SUBCUTANEOUS
Qty: 1 EACH | Refills: 5 | Status: SHIPPED | OUTPATIENT
Start: 2025-01-06

## 2025-01-06 RX ORDER — RIZATRIPTAN BENZOATE 10 MG/1
10 TABLET, ORALLY DISINTEGRATING ORAL DAILY PRN
Qty: 12 TABLET | Refills: 3 | Status: SHIPPED | OUTPATIENT
Start: 2025-01-06

## 2025-01-06 RX ORDER — MEMANTINE HYDROCHLORIDE 10 MG/1
10 TABLET ORAL 2 TIMES DAILY
Qty: 180 TABLET | Refills: 1 | Status: SHIPPED | OUTPATIENT
Start: 2025-01-06

## 2025-01-06 RX ORDER — FLUTICASONE PROPIONATE 44 UG/1
AEROSOL, METERED RESPIRATORY (INHALATION)
COMMUNITY

## 2025-01-06 RX ORDER — DAPAGLIFLOZIN 10 MG/1
1 TABLET, FILM COATED ORAL DAILY
COMMUNITY

## 2025-01-06 RX ORDER — APIXABAN 5 MG/1
1 TABLET, FILM COATED ORAL 2 TIMES DAILY
COMMUNITY
Start: 2024-01-25

## 2025-01-06 RX ORDER — MEMANTINE HYDROCHLORIDE 10 MG/1
10 TABLET ORAL 2 TIMES DAILY
Qty: 60 TABLET | Refills: 0 | OUTPATIENT
Start: 2025-01-06

## 2025-01-06 NOTE — PROGRESS NOTES
Subjective   Sandy Estes is a 49 y.o. female is here today for follow-up migraine, neurofibromatosis with optic glioma    History of Present Illness  Having 2 -3 migraines per month.  No new medical/ surgical issues.       The following portions of the patient's history were reviewed and updated as appropriate: allergies, current medications, past family history, past medical history, past social history, past surgical history and problem list.    Review of Systems   Constitutional:  Positive for fatigue.   HENT: Negative.     Eyes:  Positive for photophobia and visual disturbance.   Respiratory:  Positive for cough and shortness of breath.    Cardiovascular: Negative.    Gastrointestinal:  Positive for GERD.   Neurological:  Positive for headache.   Psychiatric/Behavioral:  Positive for dysphoric mood and sleep disturbance.          Current Outpatient Medications:     aspirin 81 MG EC tablet, Take 1 tablet by mouth Daily., Disp: , Rfl:     cyanocobalamin 1000 MCG/ML injection, Inject 1 mL every month by subcutaneous route., Disp: , Rfl:     Eliquis 5 MG tablet tablet, Take 1 tablet by mouth 2 (Two) Times a Day., Disp: , Rfl:     escitalopram (LEXAPRO) 20 MG tablet, Take 1 tablet by mouth Daily., Disp: , Rfl:     famotidine (PEPCID) 40 MG tablet, Take 1 tablet by mouth 2 (Two) Times a Day., Disp: , Rfl:     Farxiga 10 MG tablet, Take 10 mg by mouth Daily., Disp: , Rfl:     fenofibrate (TRICOR) 145 MG tablet, Take 1 tablet by mouth Daily., Disp: , Rfl:     ferrous sulfate 325 (65 FE) MG tablet, Take 1 tablet by mouth Daily With Breakfast., Disp: , Rfl:     fluticasone (FLONASE) 50 MCG/ACT nasal spray, 2 sprays by Each Nare route Daily., Disp: , Rfl:     fluticasone (Flovent HFA) 44 MCG/ACT inhaler, , Disp: , Rfl:     galcanezumab-gnlm (Emgality) 120 MG/ML auto-injector pen, Inject 1 mL under the skin into the appropriate area as directed Every 30 (Thirty) Days., Disp: 1 each, Rfl: 5    hydrOXYzine (ATARAX) 50  MG tablet, Take 1 tablet by mouth Every 4 (Four) Hours As Needed., Disp: , Rfl:     memantine (NAMENDA) 10 MG tablet, Take 1 tablet by mouth 2 (Two) Times a Day., Disp: 180 tablet, Rfl: 1    metoprolol succinate XL (TOPROL-XL) 25 MG 24 hr tablet, Take 1 tablet by mouth 2 (Two) Times a Day., Disp: , Rfl:     montelukast (SINGULAIR) 10 MG tablet, Take 1 tablet by mouth Every Night., Disp: , Rfl:     Rexulti 3 MG tablet, Take 1 tablet by mouth Daily., Disp: , Rfl:     tamsulosin (FLOMAX) 0.4 MG capsule 24 hr capsule, Take 1 capsule by mouth Daily., Disp: , Rfl:     tiotropium bromide monohydrate (SPIRIVA RESPIMAT) 2.5 MCG/ACT aerosol solution inhaler, Inhale 2 puffs Daily., Disp: , Rfl:     Topiramate ER (Trokendi XR) 200 MG capsule sustained-release 24 hr, Take 1 capsule by mouth Daily., Disp: 90 capsule, Rfl: 1    traMADol (ULTRAM) 50 MG tablet, Take 1 tablet by mouth Every 6 (Six) Hours As Needed for Moderate Pain., Disp: , Rfl:     ubrogepant (Ubrelvy) 100 MG tablet, Take 1 tablet by mouth Daily As Needed (migraine)., Disp: 16 tablet, Rfl: 5    vitamin D (ERGOCALCIFEROL) 1.25 MG (78004 UT) capsule capsule, Take 1 capsule by mouth Every 7 (Seven) Days., Disp: , Rfl:     zolpidem (AMBIEN) 10 MG tablet, Take 1 tablet by mouth At Night As Needed for Sleep., Disp: , Rfl:     Budeson-Glycopyrrol-Formoterol (Breztri Aerosphere) 160-9-4.8 MCG/ACT aerosol inhaler, Inhale 2 puffs 2 (Two) Times a Day., Disp: , Rfl:     losartan (COZAAR) 100 MG tablet, Take 1 tablet by mouth Daily., Disp: , Rfl:     rizatriptan MLT (Maxalt-MLT) 10 MG disintegrating tablet, Place 1 tablet on the tongue Daily As Needed for Migraine. May repeat in 2 hours if needed, Disp: 12 tablet, Rfl: 3     Objective   Physical Exam  Vitals and nursing note reviewed.   Eyes:      General: Vision grossly intact. Gaze aligned appropriately.   Cardiovascular:      Rate and Rhythm: Normal rate.   Pulmonary:      Effort: Pulmonary effort is normal.   Neurological:       Mental Status: She is alert and oriented to person, place, and time.      GCS: GCS eye subscore is 4. GCS verbal subscore is 5. GCS motor subscore is 6.      Cranial Nerves: Cranial nerves 2-12 are intact.      Sensory: Sensation is intact.      Motor: Motor function is intact.      Coordination: Coordination is intact.      Gait: Gait is intact.      Deep Tendon Reflexes: Reflexes are normal and symmetric.   Psychiatric:         Attention and Perception: Attention normal.         Mood and Affect: Mood normal.         Speech: Speech normal.         Behavior: Behavior normal.         Cognition and Memory: Cognition normal.       MRI Orbit Face Neck With & Without Contrast (12/13/2024 14:46)    MRI Brain With & Without Contrast (12/13/2024 14:45)    Images reviewed with the patient, optic glioma stable compared to previous MRI    Assessment & Plan   Diagnoses and all orders for this visit:    1. Migraine with aura and without status migrainosus, not intractable (Primary)  -     ubrogepant (Ubrelvy) 100 MG tablet; Take 1 tablet by mouth Daily As Needed (migraine).  Dispense: 16 tablet; Refill: 5  -     Topiramate ER (Trokendi XR) 200 MG capsule sustained-release 24 hr; Take 1 capsule by mouth Daily.  Dispense: 90 capsule; Refill: 1  -     memantine (NAMENDA) 10 MG tablet; Take 1 tablet by mouth 2 (Two) Times a Day.  Dispense: 180 tablet; Refill: 1  -     galcanezumab-gnlm (Emgality) 120 MG/ML auto-injector pen; Inject 1 mL under the skin into the appropriate area as directed Every 30 (Thirty) Days.  Dispense: 1 each; Refill: 5  -     rizatriptan MLT (Maxalt-MLT) 10 MG disintegrating tablet; Place 1 tablet on the tongue Daily As Needed for Migraine. May repeat in 2 hours if needed  Dispense: 12 tablet; Refill: 3    2. Low-grade optic pathway glioma  -     MRI brachial plexus face orbits neck w wo contrast; Future  -     MRI Brain With & Without Contrast; Future    3. Neurofibromatosis  -     MRI brachial plexus  face orbits neck w wo contrast; Future  -     MRI Brain With & Without Contrast; Future        Continue present migraine prevention/ episodic therapy.      FU/ MRI 1 year.  Call/ return sooner if increased problems.             Dictated utilizing Dragon dictation.

## 2025-01-15 ENCOUNTER — HOSPITAL ENCOUNTER (OUTPATIENT)
Dept: WOMENS IMAGING | Age: 50
Discharge: HOME | End: 2025-01-15
Payer: MEDICAID

## 2025-01-15 DIAGNOSIS — Z12.31 ENCOUNTER FOR SCREENING MAMMOGRAM FOR MALIGNANT NEOPLASM OF BREAST: ICD-10-CM

## 2025-01-15 PROCEDURE — 77063 BREAST TOMOSYNTHESIS BI: CPT

## 2025-01-16 ENCOUNTER — OFFICE VISIT (OUTPATIENT)
Dept: SURGERY | Age: 50
End: 2025-01-16

## 2025-01-16 VITALS — BODY MASS INDEX: 33.49 KG/M2 | WEIGHT: 182 LBS | HEART RATE: 84 BPM | HEIGHT: 62 IN | OXYGEN SATURATION: 98 %

## 2025-01-16 DIAGNOSIS — N60.12 FIBROCYSTIC BREAST CHANGES OF BOTH BREASTS: ICD-10-CM

## 2025-01-16 DIAGNOSIS — N60.11 FIBROCYSTIC BREAST CHANGES OF BOTH BREASTS: ICD-10-CM

## 2025-01-16 DIAGNOSIS — Z12.31 VISIT FOR SCREENING MAMMOGRAM: Primary | ICD-10-CM

## 2025-01-16 RX ORDER — CLOPIDOGREL BISULFATE 75 MG/1
1 TABLET ORAL DAILY
COMMUNITY
Start: 2024-07-30

## 2025-01-16 RX ORDER — METOPROLOL SUCCINATE 25 MG/1
1 TABLET, EXTENDED RELEASE ORAL 2 TIMES DAILY
COMMUNITY
Start: 2024-10-13

## 2025-01-16 NOTE — PROGRESS NOTES
Martha Griffiths comes today for her follow-up breast exam.  She has had no new breast complaints.  She reports no new palpable masses.  There is no skin or nipple changes.  There is no nipple discharge.  She has no appreciable evidence of supraclavicular or axillary adenopathy.    Patient Active Problem List    Diagnosis Date Noted    Lump or mass in breast 02/01/2018    Diffuse cystic mastopathy 02/01/2018    Chronic back pain     Hiatal hernia 12/03/2013    History of colon polyps 12/03/2013    Family history of colonic polyps 12/03/2013    Right lateral abdominal pain 10/08/2013    Change in bowel habits 10/08/2013    Nausea 10/08/2013    Early satiety 10/08/2013    Heartburn 10/08/2013    Smoker 11/20/2012    Family history of breast cancer 11/20/2012       Current Outpatient Medications   Medication Sig Dispense Refill    clopidogrel (PLAVIX) 75 MG tablet Take 1 tablet by mouth daily      metoprolol succinate (TOPROL XL) 25 MG extended release tablet Take 1 tablet by mouth 2 times daily      apixaban (ELIQUIS) 5 MG TABS tablet Take 1 tablet by mouth 2 times daily      hydrOXYzine HCl (ATARAX) 50 MG tablet Take 1 tablet by mouth in the morning and at bedtime      montelukast (SINGULAIR) 10 MG tablet Take 1 tablet by mouth nightly      tiotropium (SPIRIVA RESPIMAT) 2.5 MCG/ACT AERS inhaler Inhale 2 puffs into the lungs daily      dapagliflozin (FARXIGA) 10 MG tablet Take 1 tablet by mouth every morning      ferrous sulfate (IRON 325) 325 (65 Fe) MG tablet Take 1 tablet by mouth daily (with breakfast)      traMADol (ULTRAM) 50 MG tablet Take 1 tablet twice a day by oral route as needed for 30 days.      REXULTI 2 MG TABS tablet Take 1 tablet by mouth daily      escitalopram (LEXAPRO) 20 MG tablet       fenofibrate (TRICOR) 145 MG tablet Take 1 tablet by mouth daily      topiramate ER (TROKENDI XR) 100 MG CP24 Trokendi  mg capsule, extended release   Take 2 capsules every day by oral route for 30 days.

## 2025-01-25 ENCOUNTER — NURSE TRIAGE (OUTPATIENT)
Dept: CALL CENTER | Facility: HOSPITAL | Age: 50
End: 2025-01-25
Payer: COMMERCIAL

## 2025-01-25 ENCOUNTER — HOSPITAL ENCOUNTER (EMERGENCY)
Facility: HOSPITAL | Age: 50
Discharge: HOME | End: 2025-01-25
Attending: EMERGENCY MEDICINE
Payer: COMMERCIAL

## 2025-01-25 VITALS
OXYGEN SATURATION: 97 % | HEIGHT: 62 IN | SYSTOLIC BLOOD PRESSURE: 113 MMHG | TEMPERATURE: 98.1 F | WEIGHT: 185 LBS | BODY MASS INDEX: 34.04 KG/M2 | DIASTOLIC BLOOD PRESSURE: 65 MMHG | RESPIRATION RATE: 16 BRPM | HEART RATE: 60 BPM

## 2025-01-25 DIAGNOSIS — R00.2 PALPITATIONS: Primary | ICD-10-CM

## 2025-01-25 LAB
ALBUMIN SERPL-MCNC: 3.8 G/DL (ref 3.5–5.2)
ALBUMIN/GLOB SERPL: 1.3 G/DL
ALP SERPL-CCNC: 73 U/L (ref 39–117)
ALT SERPL W P-5'-P-CCNC: 13 U/L (ref 1–33)
ANION GAP SERPL CALCULATED.3IONS-SCNC: 9 MMOL/L (ref 5–15)
AST SERPL-CCNC: 16 U/L (ref 1–32)
BASOPHILS # BLD AUTO: 0.06 10*3/MM3 (ref 0–0.2)
BASOPHILS NFR BLD AUTO: 0.8 % (ref 0–1.5)
BILIRUB SERPL-MCNC: 0.2 MG/DL (ref 0–1.2)
BUN SERPL-MCNC: 17 MG/DL (ref 6–20)
BUN/CREAT SERPL: 21.5 (ref 7–25)
CALCIUM SPEC-SCNC: 8.9 MG/DL (ref 8.6–10.5)
CHLORIDE SERPL-SCNC: 107 MMOL/L (ref 98–107)
CO2 SERPL-SCNC: 23 MMOL/L (ref 22–29)
CREAT SERPL-MCNC: 0.79 MG/DL (ref 0.57–1)
D DIMER PPP FEU-MCNC: 0.42 MCGFEU/ML (ref 0–0.5)
DEPRECATED RDW RBC AUTO: 47.3 FL (ref 37–54)
EGFRCR SERPLBLD CKD-EPI 2021: 93 ML/MIN/1.73
EOSINOPHIL # BLD AUTO: 0.42 10*3/MM3 (ref 0–0.4)
EOSINOPHIL NFR BLD AUTO: 5.5 % (ref 0.3–6.2)
ERYTHROCYTE [DISTWIDTH] IN BLOOD BY AUTOMATED COUNT: 13.4 % (ref 12.3–15.4)
GEN 5 1HR TROPONIN T REFLEX: <6 NG/L
GLOBULIN UR ELPH-MCNC: 3 GM/DL
GLUCOSE SERPL-MCNC: 97 MG/DL (ref 65–99)
HCT VFR BLD AUTO: 38.5 % (ref 34–46.6)
HGB BLD-MCNC: 11.9 G/DL (ref 12–15.9)
IMM GRANULOCYTES # BLD AUTO: 0.04 10*3/MM3 (ref 0–0.05)
IMM GRANULOCYTES NFR BLD AUTO: 0.5 % (ref 0–0.5)
LYMPHOCYTES # BLD AUTO: 1.36 10*3/MM3 (ref 0.7–3.1)
LYMPHOCYTES NFR BLD AUTO: 17.9 % (ref 19.6–45.3)
MAGNESIUM SERPL-MCNC: 2.1 MG/DL (ref 1.6–2.6)
MCH RBC QN AUTO: 29.4 PG (ref 26.6–33)
MCHC RBC AUTO-ENTMCNC: 30.9 G/DL (ref 31.5–35.7)
MCV RBC AUTO: 95.1 FL (ref 79–97)
MONOCYTES # BLD AUTO: 0.7 10*3/MM3 (ref 0.1–0.9)
MONOCYTES NFR BLD AUTO: 9.2 % (ref 5–12)
NEUTROPHILS NFR BLD AUTO: 5 10*3/MM3 (ref 1.7–7)
NEUTROPHILS NFR BLD AUTO: 66.1 % (ref 42.7–76)
NRBC BLD AUTO-RTO: 0 /100 WBC (ref 0–0.2)
NT-PROBNP SERPL-MCNC: 858.9 PG/ML (ref 0–450)
PLATELET # BLD AUTO: 223 10*3/MM3 (ref 140–450)
PMV BLD AUTO: 11.7 FL (ref 6–12)
POTASSIUM SERPL-SCNC: 3.9 MMOL/L (ref 3.5–5.2)
PROT SERPL-MCNC: 6.8 G/DL (ref 6–8.5)
QT INTERVAL: 486 MS
QTC INTERVAL: 477 MS
RBC # BLD AUTO: 4.05 10*6/MM3 (ref 3.77–5.28)
SODIUM SERPL-SCNC: 139 MMOL/L (ref 136–145)
T4 FREE SERPL-MCNC: 1.01 NG/DL (ref 0.93–1.7)
TROPONIN T NUMERIC DELTA: NORMAL
TROPONIN T SERPL HS-MCNC: <6 NG/L
TSH SERPL DL<=0.05 MIU/L-ACNC: 6.56 UIU/ML (ref 0.27–4.2)
WBC NRBC COR # BLD AUTO: 7.58 10*3/MM3 (ref 3.4–10.8)

## 2025-01-25 PROCEDURE — 84439 ASSAY OF FREE THYROXINE: CPT | Performed by: EMERGENCY MEDICINE

## 2025-01-25 PROCEDURE — 93005 ELECTROCARDIOGRAM TRACING: CPT

## 2025-01-25 PROCEDURE — 93005 ELECTROCARDIOGRAM TRACING: CPT | Performed by: EMERGENCY MEDICINE

## 2025-01-25 PROCEDURE — 83735 ASSAY OF MAGNESIUM: CPT | Performed by: EMERGENCY MEDICINE

## 2025-01-25 PROCEDURE — 99283 EMERGENCY DEPT VISIT LOW MDM: CPT

## 2025-01-25 PROCEDURE — 36415 COLL VENOUS BLD VENIPUNCTURE: CPT

## 2025-01-25 PROCEDURE — 83880 ASSAY OF NATRIURETIC PEPTIDE: CPT | Performed by: EMERGENCY MEDICINE

## 2025-01-25 PROCEDURE — 84484 ASSAY OF TROPONIN QUANT: CPT | Performed by: EMERGENCY MEDICINE

## 2025-01-25 PROCEDURE — 85379 FIBRIN DEGRADATION QUANT: CPT | Performed by: EMERGENCY MEDICINE

## 2025-01-25 PROCEDURE — 80050 GENERAL HEALTH PANEL: CPT | Performed by: EMERGENCY MEDICINE

## 2025-01-25 RX ORDER — SODIUM CHLORIDE 0.9 % (FLUSH) 0.9 %
10 SYRINGE (ML) INJECTION AS NEEDED
Status: DISCONTINUED | OUTPATIENT
Start: 2025-01-25 | End: 2025-01-25 | Stop reason: HOSPADM

## 2025-01-25 NOTE — TELEPHONE ENCOUNTER
"Caller states having palpitations for a few day's but went away and came back last night and has kept her up as she's feeling like her heart is racing but  monitor states rate is 60-75. Caller states she is feeling warm, some shortness of breath and anxiety. Caller states has history of CHF, A-Fib and Valve Replacement. Caller advised per guideline. Caller advised should symptoms become worse upgrade to 911.         Reason for Disposition   Difficulty breathing    Additional Information   Negative: Passed out (i.e., lost consciousness, collapsed and was not responding)   Negative: Shock suspected (e.g., cold/pale/clammy skin, too weak to stand, low BP, rapid pulse)   Negative: Difficult to awaken or acting confused (e.g., disoriented, slurred speech)   Negative: Visible sweat on face or sweat dripping down face   Negative: Unable to walk, or can only walk with assistance (e.g., requires support)   Negative: [1] Received SHOCK from implantable cardiac defibrillator AND [2] persisting symptoms (i.e., palpitations, lightheadedness)   Negative: [1] Dizziness, lightheadedness, or weakness AND [2] heart beating very rapidly (e.g., > 140 / minute)   Negative: [1] Dizziness, lightheadedness, or weakness AND [2] heart beating very slowly (e.g., < 50 / minute)   Negative: Sounds like a life-threatening emergency to the triager   Negative: Chest pain   Negative: Implantable Cardiac Defibrillator (ICD) or a pacemaker symptoms or questions    Answer Assessment - Initial Assessment Questions  1. DESCRIPTION: \"Please describe your heart rate or heartbeat that you are having\" (e.g., fast/slow, regular/irregular, skipped or extra beats, \"palpitations\")      Racing and some pounding and some anxiety   2. ONSET: \"When did it start?\" (Minutes, hours or days)       Couple day's ago but went away. Caller states started yesterday and has been keeping her up tonight   3. DURATION: \"How long does it last\" (e.g., seconds, minutes, hours)     " " States staying with her all the time since last night   4. PATTERN \"Does it come and go, or has it been constant since it started?\"  \"Does it get worse with exertion?\"   \"Are you feeling it now?\"      Constant   5. TAP: \"Using your hand, can you tap out what you are feeling on a chair or table in front of you, so that I can hear?\" (Note: not all patients can do this)          6. HEART RATE: \"Can you tell me your heart rate?\" \"How many beats in 15 seconds?\"  (Note: not all patients can do this)        60-75  7. RECURRENT SYMPTOM: \"Have you ever had this before?\" If Yes, ask: \"When was the last time?\" and \"What happened that time?\"       When I had A-fib in past   8. CAUSE: \"What do you think is causing the palpitations?\"      A-Fib   9. CARDIAC HISTORY: \"Do you have any history of heart disease?\" (e.g., heart attack, angina, bypass surgery, angioplasty, arrhythmia)       History of A-Fib, CHF and Pulmonary Valve Replacement   10. OTHER SYMPTOMS: \"Do you have any other symptoms?\" (e.g., dizziness, chest pain, sweating, difficulty breathing)        Shortness of breath and feeling warm, anxiety   11. PREGNANCY: \"Is there any chance you are pregnant?\" \"When was your last menstrual period?\"    Protocols used: Heart Rate and Heartbeat Questions-ADULT-AH    "

## 2025-01-25 NOTE — ED PROVIDER NOTES
Subjective   History of Present Illness  Patient presents with a complaint of palpitations where her heart was racing.  This is last night.  She said it went on for some time but I gather this [place] at least a few minutes.  However when I ask about the racing and if she could check her heart rate she said she had a pulse ox and it said that her heart rate was 75-80.  I pointed out to her that that was normal according to our usual values even though her usual heart rate was slower.  She says she does have a history of atrial flutter and was worried that it might to be in atrial fibs or something else.  This morning it is not going on right now but she wanted to be checked out.    History provided by:  Patient   used: No    Palpitations  Palpitations quality:  Fast  Onset quality:  Sudden  Duration:  15 minutes  Timing:  Constant  Progression:  Resolved  Chronicity:  New  Context: not anxiety, not appetite suppressants, not blood loss, not bronchodilators, not caffeine, not dehydration, not exercise, not hyperventilation, not illicit drugs, not nicotine and not stimulant use    Relieved by:  Nothing  Worsened by:  Nothing  Ineffective treatments:  None tried  Associated symptoms: no back pain, no chest pain, no chest pressure, no cough, no diaphoresis, no dizziness, no hemoptysis, no leg pain, no lower extremity edema, no malaise/fatigue, no nausea, no near-syncope, no numbness, no orthopnea, no PND, no shortness of breath, no syncope, no vomiting and no weakness    Risk factors: hx of atrial fibrillation    Risk factors: no diabetes mellitus, no heart disease, no hx of DVT, no hx of PE, no hx of thyroid disease, no hypercoagulable state, no hyperthyroidism, no OTC sinus medications and no stress        Review of Systems   Constitutional: Negative.  Negative for diaphoresis and malaise/fatigue.   HENT: Negative.     Respiratory: Negative.  Negative for cough, hemoptysis and shortness of  breath.    Cardiovascular:  Positive for palpitations. Negative for chest pain, orthopnea, syncope, PND and near-syncope.   Gastrointestinal:  Negative for nausea and vomiting.   Genitourinary: Negative.    Musculoskeletal: Negative.  Negative for back pain.   Skin: Negative.    Neurological: Negative.  Negative for dizziness, weakness and numbness.   Psychiatric/Behavioral: Negative.     All other systems reviewed and are negative.      Past Medical History:   Diagnosis Date    Anxiety     Asthma     Brain tumor, glioma     Optic chiasm    CHF (congestive heart failure)     Depression     GERD (gastroesophageal reflux disease)     Headache     Hyperlipidemia     Hypertension     Migraine     Neurofibromatosis     6 months old dx       Allergies   Allergen Reactions    Penicillin G Shortness Of Breath     Other reaction(s): Unknown    Penicillins Shortness Of Breath    Viibryd [Vilazodone Hcl] Nausea And Vomiting and Other (See Comments)     fatigue    Amoxil [Amoxicillin] Hives    Biaxin [Clarithromycin] Hives    Cefzil [Cefprozil] Hives    Clindamycin/Lincomycin Hives    Doxycycline Hives    Lisinopril Other (See Comments)     Itchy throat, clearing throat a lot with this med    Sulfa Antibiotics Hives    Sulfasalazine Hives    Vancomycin Hives    Zofran [Ondansetron Hcl] Nausea And Vomiting       Past Surgical History:   Procedure Laterality Date    CARDIAC SURGERY      COLONOSCOPY      ENDOSCOPIC FUNCTIONAL SINUS SURGERY (FESS) Bilateral 10/09/2020    Procedure: ENDOSCOPIC FUNCTIONAL SINUS SURGERY W TRACKING, BILATERAL MAXILLARY AND LEFT SPHENOID SINUPLASTY,  RESECT INFERIOR TURBINATES VIA COBLATION;  Surgeon: Bairon Ramirez MD;  Location: [facility];  Service: ENT;  Laterality: Bilateral;    ENDOSCOPY      GALLBLADDER SURGERY      HYSTERECTOMY      OTHER SURGICAL HISTORY      repaired infundibular & valvular PS: res. trivial PS and Mild to Mod. PI (age 8)    TONSILLECTOMY AND ADENOIDECTOMY         Family  History   Problem Relation Age of Onset    Breast cancer Mother     Neurofibromatosis Mother     Neuropathy Mother     Heart disease Father     Hypertension Father     Diabetes Father     Migraines Father     Neurofibromatosis Maternal Grandfather     Stroke Maternal Grandfather     Dementia Maternal Grandmother        Social History     Socioeconomic History    Marital status: Single   Tobacco Use    Smoking status: Former     Current packs/day: 0.00     Average packs/day: 0.5 packs/day for 10.0 years (5.0 ttl pk-yrs)     Types: Cigarettes     Start date: 2003     Quit date: 2013     Years since quittin.0    Smokeless tobacco: Never   Vaping Use    Vaping status: Former   Substance and Sexual Activity    Alcohol use: No    Drug use: Never    Sexual activity: Defer           Objective   Physical Exam  Vitals and nursing note reviewed.   Constitutional:       Appearance: Normal appearance.   HENT:      Head: Normocephalic and atraumatic.   Eyes:      Extraocular Movements: Extraocular movements intact.      Pupils: Pupils are equal, round, and reactive to light.   Cardiovascular:      Rate and Rhythm: Normal rate and regular rhythm.   Pulmonary:      Effort: Pulmonary effort is normal.      Breath sounds: Normal breath sounds.   Abdominal:      Palpations: Abdomen is soft.      Tenderness: There is no abdominal tenderness.   Musculoskeletal:         General: Normal range of motion.      Cervical back: Normal range of motion and neck supple.   Neurological:      General: No focal deficit present.      Mental Status: She is alert and oriented to person, place, and time.   Psychiatric:         Mood and Affect: Mood normal.         Behavior: Behavior normal.         Procedures           ED Course                                                       Medical Decision Making  The patient her testing here was all fine.  Her lab work was fine and her EKG was okay.  On the monitor here she has been in a stable  rhythm the whole time.  I told him not sure what happened last night.  I am guessing theoretically could have been atrial fibs that is resolved now but there is nothing to do about right now.  I recommended she follow-up with her family doctor to get something like a Holter monitor to check it further.  She is discharged in stable condition.    Problems Addressed:  Palpitations: complicated acute illness or injury    Amount and/or Complexity of Data Reviewed  Labs: ordered.  ECG/medicine tests: ordered.    Risk  Prescription drug management.        Final diagnoses:   Palpitations       ED Disposition  ED Disposition       ED Disposition   Discharge    Condition   Stable    Comment   --               Kenneth Kolb MD  [address] [place] [id number]  [phone]      If symptoms worsen         Medication List      No changes were made to your prescriptions during this visit.            Peter Gentile Jr., MD  01/25/25 [id number]

## 2025-01-26 NOTE — TELEPHONE ENCOUNTER
"Reason for Disposition   General information question, no triage required and triager able to answer question    Additional Information   Negative: [1] Caller is not with the adult (patient) AND [2] reporting urgent symptoms   Negative: Lab result questions   Negative: Medication questions   Negative: Caller can't be reached by phone   Negative: Caller has already spoken to PCP or another triager   Negative: RN needs further essential information from caller in order to complete triage   Negative: Requesting regular office appointment   Negative: [1] Caller requesting NON-URGENT health information AND [2] PCP's office is the best resource   Negative: Health Information question, no triage required and triager able to answer question   Negative: Question about upcoming scheduled test, no triage required and triager able to answer question   Negative: [1] Caller is not with the adult (patient) AND [2] probable NON-URGENT symptoms   Negative: [1] Follow-up call to recent contact AND [2] information only call, no triage required    Answer Assessment - Initial Assessment Questions  1. REASON FOR CALL or QUESTION: \"What is your reason for calling today?\" or \"How can I best help you?\" or \"What question do you have that I can help answer?\"      States has new test result and wants to go over it    Protocols used: Information Only Call-ADULT-    "

## 2025-01-26 NOTE — TELEPHONE ENCOUNTER
States has new test result and wants to go over it , was in ER today, instructed she will have to call her PCP to go over new test result, no further question at this time

## 2025-02-03 ENCOUNTER — TELEPHONE (OUTPATIENT)
Dept: OTHER | Age: 50
End: 2025-02-03

## 2025-02-03 NOTE — TELEPHONE ENCOUNTER
Mom-     Completed a post-test results disclosure discussion with patient. The patient's testing identified a clinically actionable gene mutation.  We reviewed the cancer risks associated with the corresponding hereditary cancer predisposition syndrome and the summary of relevant medical management changes found on the Medical Management Tool (MMT).               Results are uploaded under the Lab Tab.   Patient was given a letter to share with family members that want to be tested free of charge. Patient was also given a direct number to reach a [employer] Counselor and encouraged to call to set up an in depth televisit regarding their mutation.       Last colonoscopy and endoscopy 9/19/2023 with Dr. Walters.  Last mammogram 1/15/2025- normal and seen Jerry 1/16/2025.  Appt made for Jerry to discuss high risk breast clinic on 2/27/2025.

## 2025-02-04 ENCOUNTER — TELEPHONE (OUTPATIENT)
Dept: SURGERY | Age: 50
End: 2025-02-04

## 2025-02-04 DIAGNOSIS — Z80.3 FAMILY HISTORY OF BREAST CANCER: ICD-10-CM

## 2025-02-04 DIAGNOSIS — Z15.01 NF1 GENE MUTATION POSITIVE: ICD-10-CM

## 2025-02-04 DIAGNOSIS — Z15.09 NF1 GENE MUTATION POSITIVE: ICD-10-CM

## 2025-02-04 DIAGNOSIS — Z91.89 INCREASED RISK OF BREAST CANCER: Primary | ICD-10-CM

## 2025-02-04 LAB
QT INTERVAL: 486 MS
QTC INTERVAL: 477 MS

## 2025-02-04 NOTE — TELEPHONE ENCOUNTER
I have discussed with the patient her genetic testing.  Testing results makes her at increased risk for breast cancer.  We will plan breast MRI in July.  I have recently seen her with a negative exam.  I have reviewed her mammogram.  There are no suspicious findings.  An appointment has been made for her to see me on February 27.  We will have her cancel this appointment.    This has been electronically signed by Jerry Mitchell PA-C.

## 2025-02-14 ENCOUNTER — APPOINTMENT (OUTPATIENT)
Dept: GENERAL RADIOLOGY | Facility: HOSPITAL | Age: 50
End: 2025-02-14
Payer: COMMERCIAL

## 2025-02-14 ENCOUNTER — HOSPITAL ENCOUNTER (OUTPATIENT)
Facility: HOSPITAL | Age: 50
Setting detail: OBSERVATION
Discharge: HOME | End: 2025-02-16
Attending: EMERGENCY MEDICINE | Admitting: INTERNAL MEDICINE
Payer: COMMERCIAL

## 2025-02-14 DIAGNOSIS — R00.2 PALPITATIONS: Primary | ICD-10-CM

## 2025-02-14 DIAGNOSIS — R00.1 BRADYCARDIA: ICD-10-CM

## 2025-02-14 LAB
ALBUMIN SERPL-MCNC: 3.9 G/DL (ref 3.5–5.2)
ALBUMIN/GLOB SERPL: 1.2 G/DL
ALP SERPL-CCNC: 75 U/L (ref 39–117)
ALT SERPL W P-5'-P-CCNC: 16 U/L (ref 1–33)
ANION GAP SERPL CALCULATED.3IONS-SCNC: 10 MMOL/L (ref 5–15)
AST SERPL-CCNC: 21 U/L (ref 1–32)
BASOPHILS # BLD AUTO: 0.05 10*3/MM3 (ref 0–0.2)
BASOPHILS NFR BLD AUTO: 0.6 % (ref 0–1.5)
BILIRUB SERPL-MCNC: 0.4 MG/DL (ref 0–1.2)
BILIRUB UR QL STRIP: NEGATIVE
BUN SERPL-MCNC: 19 MG/DL (ref 6–20)
BUN/CREAT SERPL: 18.8 (ref 7–25)
CALCIUM SPEC-SCNC: 9.3 MG/DL (ref 8.6–10.5)
CHLORIDE SERPL-SCNC: 106 MMOL/L (ref 98–107)
CLARITY UR: CLEAR
CO2 SERPL-SCNC: 23 MMOL/L (ref 22–29)
COLOR UR: ABNORMAL
CREAT SERPL-MCNC: 1.01 MG/DL (ref 0.57–1)
DEPRECATED RDW RBC AUTO: 44.6 FL (ref 37–54)
EGFRCR SERPLBLD CKD-EPI 2021: 69.2 ML/MIN/1.73
EOSINOPHIL # BLD AUTO: 0.43 10*3/MM3 (ref 0–0.4)
EOSINOPHIL NFR BLD AUTO: 5.3 % (ref 0.3–6.2)
ERYTHROCYTE [DISTWIDTH] IN BLOOD BY AUTOMATED COUNT: 13 % (ref 12.3–15.4)
GEN 5 1HR TROPONIN T REFLEX: <6 NG/L
GLOBULIN UR ELPH-MCNC: 3.2 GM/DL
GLUCOSE SERPL-MCNC: 83 MG/DL (ref 65–99)
GLUCOSE UR STRIP-MCNC: ABNORMAL MG/DL
HCT VFR BLD AUTO: 39.3 % (ref 34–46.6)
HGB BLD-MCNC: 12.3 G/DL (ref 12–15.9)
HGB UR QL STRIP.AUTO: NEGATIVE
HOLD SPECIMEN: NORMAL
HOLD SPECIMEN: NORMAL
IMM GRANULOCYTES # BLD AUTO: 0.03 10*3/MM3 (ref 0–0.05)
IMM GRANULOCYTES NFR BLD AUTO: 0.4 % (ref 0–0.5)
KETONES UR QL STRIP: NEGATIVE
LEUKOCYTE ESTERASE UR QL STRIP.AUTO: NEGATIVE
LYMPHOCYTES # BLD AUTO: 1.21 10*3/MM3 (ref 0.7–3.1)
LYMPHOCYTES NFR BLD AUTO: 14.8 % (ref 19.6–45.3)
MAGNESIUM SERPL-MCNC: 2.2 MG/DL (ref 1.6–2.6)
MCH RBC QN AUTO: 29.5 PG (ref 26.6–33)
MCHC RBC AUTO-ENTMCNC: 31.3 G/DL (ref 31.5–35.7)
MCV RBC AUTO: 94.2 FL (ref 79–97)
MONOCYTES # BLD AUTO: 0.65 10*3/MM3 (ref 0.1–0.9)
MONOCYTES NFR BLD AUTO: 7.9 % (ref 5–12)
NEUTROPHILS NFR BLD AUTO: 5.81 10*3/MM3 (ref 1.7–7)
NEUTROPHILS NFR BLD AUTO: 71 % (ref 42.7–76)
NITRITE UR QL STRIP: NEGATIVE
NRBC BLD AUTO-RTO: 0 /100 WBC (ref 0–0.2)
PH UR STRIP.AUTO: 5.5 [PH] (ref 5–8)
PLATELET # BLD AUTO: 283 10*3/MM3 (ref 140–450)
PMV BLD AUTO: 11 FL (ref 6–12)
POTASSIUM SERPL-SCNC: 3.9 MMOL/L (ref 3.5–5.2)
PROT SERPL-MCNC: 7.1 G/DL (ref 6–8.5)
PROT UR QL STRIP: NEGATIVE
RBC # BLD AUTO: 4.17 10*6/MM3 (ref 3.77–5.28)
SODIUM SERPL-SCNC: 139 MMOL/L (ref 136–145)
SP GR UR STRIP: 1.03 (ref 1–1.03)
TROPONIN T NUMERIC DELTA: NORMAL
TROPONIN T SERPL HS-MCNC: <6 NG/L
UROBILINOGEN UR QL STRIP: ABNORMAL
WBC NRBC COR # BLD AUTO: 8.18 10*3/MM3 (ref 3.4–10.8)
WHOLE BLOOD HOLD COAG: NORMAL
WHOLE BLOOD HOLD SPECIMEN: NORMAL

## 2025-02-14 PROCEDURE — 25010000002 METOCLOPRAMIDE PER 10 MG: Performed by: EMERGENCY MEDICINE

## 2025-02-14 PROCEDURE — G0378 HOSPITAL OBSERVATION PER HR: HCPCS

## 2025-02-14 PROCEDURE — 83735 ASSAY OF MAGNESIUM: CPT

## 2025-02-14 PROCEDURE — 99285 EMERGENCY DEPT VISIT HI MDM: CPT

## 2025-02-14 PROCEDURE — 81003 URINALYSIS AUTO W/O SCOPE: CPT

## 2025-02-14 PROCEDURE — 93005 ELECTROCARDIOGRAM TRACING: CPT

## 2025-02-14 PROCEDURE — 87040 BLOOD CULTURE FOR BACTERIA: CPT | Performed by: EMERGENCY MEDICINE

## 2025-02-14 PROCEDURE — 84484 ASSAY OF TROPONIN QUANT: CPT

## 2025-02-14 PROCEDURE — 96374 THER/PROPH/DIAG INJ IV PUSH: CPT

## 2025-02-14 PROCEDURE — 85025 COMPLETE CBC W/AUTO DIFF WBC: CPT

## 2025-02-14 PROCEDURE — 71045 X-RAY EXAM CHEST 1 VIEW: CPT

## 2025-02-14 PROCEDURE — 25810000003 SODIUM CHLORIDE 0.9 % SOLUTION: Performed by: EMERGENCY MEDICINE

## 2025-02-14 PROCEDURE — 36415 COLL VENOUS BLD VENIPUNCTURE: CPT

## 2025-02-14 PROCEDURE — 80053 COMPREHEN METABOLIC PANEL: CPT

## 2025-02-14 PROCEDURE — 94761 N-INVAS EAR/PLS OXIMETRY MLT: CPT

## 2025-02-14 PROCEDURE — 94640 AIRWAY INHALATION TREATMENT: CPT

## 2025-02-14 RX ORDER — AMOXICILLIN 250 MG
2 CAPSULE ORAL 2 TIMES DAILY PRN
Status: DISCONTINUED | OUTPATIENT
Start: 2025-02-14 | End: 2025-02-16 | Stop reason: HOSPADM

## 2025-02-14 RX ORDER — POLYETHYLENE GLYCOL 3350 17 G/17G
17 POWDER, FOR SOLUTION ORAL DAILY PRN
Status: DISCONTINUED | OUTPATIENT
Start: 2025-02-14 | End: 2025-02-16 | Stop reason: HOSPADM

## 2025-02-14 RX ORDER — ONDANSETRON 2 MG/ML
4 INJECTION INTRAMUSCULAR; INTRAVENOUS ONCE
Status: DISCONTINUED | OUTPATIENT
Start: 2025-02-14 | End: 2025-02-14

## 2025-02-14 RX ORDER — BISACODYL 5 MG/1
5 TABLET, DELAYED RELEASE ORAL DAILY PRN
Status: DISCONTINUED | OUTPATIENT
Start: 2025-02-14 | End: 2025-02-16 | Stop reason: HOSPADM

## 2025-02-14 RX ORDER — HYDROXYZINE HYDROCHLORIDE 25 MG/1
50 TABLET, FILM COATED ORAL 2 TIMES DAILY
Status: DISCONTINUED | OUTPATIENT
Start: 2025-02-14 | End: 2025-02-16 | Stop reason: HOSPADM

## 2025-02-14 RX ORDER — BUDESONIDE AND FORMOTEROL FUMARATE DIHYDRATE 160; 4.5 UG/1; UG/1
2 AEROSOL RESPIRATORY (INHALATION)
Status: DISCONTINUED | OUTPATIENT
Start: 2025-02-14 | End: 2025-02-16 | Stop reason: HOSPADM

## 2025-02-14 RX ORDER — ESCITALOPRAM OXALATE 10 MG/1
20 TABLET ORAL DAILY
Status: DISCONTINUED | OUTPATIENT
Start: 2025-02-15 | End: 2025-02-16 | Stop reason: HOSPADM

## 2025-02-14 RX ORDER — SODIUM CHLORIDE 0.9 % (FLUSH) 0.9 %
10 SYRINGE (ML) INJECTION AS NEEDED
Status: DISCONTINUED | OUTPATIENT
Start: 2025-02-14 | End: 2025-02-16 | Stop reason: HOSPADM

## 2025-02-14 RX ORDER — ACETAMINOPHEN 650 MG/1
650 SUPPOSITORY RECTAL EVERY 4 HOURS PRN
Status: DISCONTINUED | OUTPATIENT
Start: 2025-02-14 | End: 2025-02-16 | Stop reason: HOSPADM

## 2025-02-14 RX ORDER — NITROGLYCERIN 0.4 MG/1
0.4 TABLET SUBLINGUAL
Status: DISCONTINUED | OUTPATIENT
Start: 2025-02-14 | End: 2025-02-16 | Stop reason: HOSPADM

## 2025-02-14 RX ORDER — FAMOTIDINE 20 MG/1
40 TABLET, FILM COATED ORAL 2 TIMES DAILY
Status: DISCONTINUED | OUTPATIENT
Start: 2025-02-14 | End: 2025-02-16 | Stop reason: HOSPADM

## 2025-02-14 RX ORDER — FERROUS SULFATE 325(65) MG
325 TABLET ORAL
Status: DISCONTINUED | OUTPATIENT
Start: 2025-02-15 | End: 2025-02-16 | Stop reason: HOSPADM

## 2025-02-14 RX ORDER — SODIUM CHLORIDE 0.9 % (FLUSH) 0.9 %
10 SYRINGE (ML) INJECTION AS NEEDED
Status: DISCONTINUED | OUTPATIENT
Start: 2025-02-14 | End: 2025-02-15

## 2025-02-14 RX ORDER — MONTELUKAST SODIUM 10 MG/1
10 TABLET ORAL NIGHTLY
Status: DISCONTINUED | OUTPATIENT
Start: 2025-02-14 | End: 2025-02-16 | Stop reason: HOSPADM

## 2025-02-14 RX ORDER — SODIUM CHLORIDE 0.9 % (FLUSH) 0.9 %
10 SYRINGE (ML) INJECTION EVERY 12 HOURS SCHEDULED
Status: DISCONTINUED | OUTPATIENT
Start: 2025-02-14 | End: 2025-02-16 | Stop reason: HOSPADM

## 2025-02-14 RX ORDER — MEMANTINE HYDROCHLORIDE 5 MG/1
10 TABLET ORAL 2 TIMES DAILY
Status: DISCONTINUED | OUTPATIENT
Start: 2025-02-14 | End: 2025-02-16 | Stop reason: HOSPADM

## 2025-02-14 RX ORDER — TOPIRAMATE 100 MG/1
100 TABLET, FILM COATED ORAL EVERY 12 HOURS SCHEDULED
Status: DISCONTINUED | OUTPATIENT
Start: 2025-02-14 | End: 2025-02-16 | Stop reason: HOSPADM

## 2025-02-14 RX ORDER — ACETAMINOPHEN 160 MG/5ML
650 SOLUTION ORAL EVERY 4 HOURS PRN
Status: DISCONTINUED | OUTPATIENT
Start: 2025-02-14 | End: 2025-02-16 | Stop reason: HOSPADM

## 2025-02-14 RX ORDER — ASPIRIN 81 MG/1
81 TABLET ORAL DAILY
Status: DISCONTINUED | OUTPATIENT
Start: 2025-02-15 | End: 2025-02-15

## 2025-02-14 RX ORDER — FLUTICASONE PROPIONATE 50 MCG
2 SPRAY, SUSPENSION (ML) NASAL DAILY
Status: DISCONTINUED | OUTPATIENT
Start: 2025-02-15 | End: 2025-02-16 | Stop reason: HOSPADM

## 2025-02-14 RX ORDER — ONDANSETRON 2 MG/ML
4 INJECTION INTRAMUSCULAR; INTRAVENOUS EVERY 6 HOURS PRN
Status: DISCONTINUED | OUTPATIENT
Start: 2025-02-14 | End: 2025-02-16 | Stop reason: HOSPADM

## 2025-02-14 RX ORDER — SODIUM CHLORIDE 9 MG/ML
40 INJECTION, SOLUTION INTRAVENOUS AS NEEDED
Status: DISCONTINUED | OUTPATIENT
Start: 2025-02-14 | End: 2025-02-16 | Stop reason: HOSPADM

## 2025-02-14 RX ORDER — METOCLOPRAMIDE HYDROCHLORIDE 5 MG/ML
10 INJECTION INTRAMUSCULAR; INTRAVENOUS ONCE
Status: COMPLETED | OUTPATIENT
Start: 2025-02-14 | End: 2025-02-14

## 2025-02-14 RX ORDER — ACETAMINOPHEN 325 MG/1
650 TABLET ORAL EVERY 4 HOURS PRN
Status: DISCONTINUED | OUTPATIENT
Start: 2025-02-14 | End: 2025-02-16 | Stop reason: HOSPADM

## 2025-02-14 RX ORDER — BISACODYL 10 MG
10 SUPPOSITORY, RECTAL RECTAL DAILY PRN
Status: DISCONTINUED | OUTPATIENT
Start: 2025-02-14 | End: 2025-02-16 | Stop reason: HOSPADM

## 2025-02-14 RX ORDER — ZOLPIDEM TARTRATE 5 MG/1
10 TABLET ORAL NIGHTLY PRN
Status: DISCONTINUED | OUTPATIENT
Start: 2025-02-14 | End: 2025-02-16 | Stop reason: HOSPADM

## 2025-02-14 RX ORDER — TRAMADOL HYDROCHLORIDE 50 MG/1
50 TABLET ORAL EVERY 6 HOURS PRN
Status: DISCONTINUED | OUTPATIENT
Start: 2025-02-14 | End: 2025-02-16 | Stop reason: HOSPADM

## 2025-02-14 RX ADMIN — IPRATROPIUM BROMIDE 0.5 MG: 0.5 SOLUTION RESPIRATORY (INHALATION) at 20:52

## 2025-02-14 RX ADMIN — METOCLOPRAMIDE 10 MG: 5 INJECTION, SOLUTION INTRAMUSCULAR; INTRAVENOUS at 18:03

## 2025-02-14 RX ADMIN — TOPIRAMATE 100 MG: 100 TABLET, FILM COATED ORAL at 22:02

## 2025-02-14 RX ADMIN — MEMANTINE 10 MG: 5 TABLET ORAL at 22:02

## 2025-02-14 RX ADMIN — MONTELUKAST SODIUM 10 MG: 10 TABLET, COATED ORAL at 22:02

## 2025-02-14 RX ADMIN — APIXABAN 5 MG: 5 TABLET, FILM COATED ORAL at 22:02

## 2025-02-14 RX ADMIN — SODIUM CHLORIDE 1000 ML: 9 INJECTION, SOLUTION INTRAVENOUS at 18:02

## 2025-02-14 RX ADMIN — Medication 10 ML: at 22:03

## 2025-02-14 RX ADMIN — HYDROXYZINE HYDROCHLORIDE 50 MG: 25 TABLET ORAL at 22:02

## 2025-02-14 RX ADMIN — FAMOTIDINE 40 MG: 20 TABLET, FILM COATED ORAL at 22:02

## 2025-02-15 ENCOUNTER — APPOINTMENT (OUTPATIENT)
Dept: CARDIOLOGY | Facility: HOSPITAL | Age: 50
End: 2025-02-15
Payer: COMMERCIAL

## 2025-02-15 ENCOUNTER — PREP FOR SURGERY (OUTPATIENT)
Dept: OTHER | Facility: HOSPITAL | Age: 50
End: 2025-02-15
Payer: COMMERCIAL

## 2025-02-15 DIAGNOSIS — I48.0 PAROXYSMAL ATRIAL FIBRILLATION: Primary | ICD-10-CM

## 2025-02-15 PROBLEM — R00.1 BRADYCARDIA: Status: ACTIVE | Noted: 2025-02-15

## 2025-02-15 PROBLEM — Z95.2 HISTORY OF PULMONIC VALVE REPLACEMENT: Status: ACTIVE | Noted: 2025-02-15

## 2025-02-15 PROBLEM — G47.33 OSA (OBSTRUCTIVE SLEEP APNEA): Status: ACTIVE | Noted: 2025-02-15

## 2025-02-15 LAB
ANION GAP SERPL CALCULATED.3IONS-SCNC: 12 MMOL/L (ref 5–15)
AV MEAN PRESS GRAD SYS DOP V1V2: 6 MMHG
AV VMAX SYS DOP: 163 CM/SEC
BASOPHILS # BLD AUTO: 0.05 10*3/MM3 (ref 0–0.2)
BASOPHILS NFR BLD AUTO: 0.7 % (ref 0–1.5)
BH CV ECHO MEAS - AO MAX PG: 10.6 MMHG
BH CV ECHO MEAS - AO ROOT DIAM: 2.6 CM
BH CV ECHO MEAS - AO V2 VTI: 36.9 CM
BH CV ECHO MEAS - AVA(I,D): 2.05 CM2
BH CV ECHO MEAS - EDV(CUBED): 66.2 ML
BH CV ECHO MEAS - EDV(MOD-SP2): 73.8 ML
BH CV ECHO MEAS - EDV(MOD-SP4): 98.9 ML
BH CV ECHO MEAS - EF(MOD-SP2): 64.5 %
BH CV ECHO MEAS - EF(MOD-SP4): 75.9 %
BH CV ECHO MEAS - ESV(CUBED): 12.2 ML
BH CV ECHO MEAS - ESV(MOD-SP2): 26.2 ML
BH CV ECHO MEAS - ESV(MOD-SP4): 23.8 ML
BH CV ECHO MEAS - FS: 43.2 %
BH CV ECHO MEAS - IVS/LVPW: 1.27 CM
BH CV ECHO MEAS - IVSD: 1.48 CM
BH CV ECHO MEAS - LA DIMENSION: 3.8 CM
BH CV ECHO MEAS - LAT PEAK E' VEL: 12.3 CM/SEC
BH CV ECHO MEAS - LV DIASTOLIC VOL/BSA (35-75): 53.2 CM2
BH CV ECHO MEAS - LV MASS(C)D: 194.4 GRAMS
BH CV ECHO MEAS - LV MAX PG: 7.7 MMHG
BH CV ECHO MEAS - LV MEAN PG: 4 MMHG
BH CV ECHO MEAS - LV SYSTOLIC VOL/BSA (12-30): 12.8 CM2
BH CV ECHO MEAS - LV V1 MAX: 139 CM/SEC
BH CV ECHO MEAS - LV V1 VTI: 29.7 CM
BH CV ECHO MEAS - LVIDD: 4 CM
BH CV ECHO MEAS - LVIDS: 2.3 CM
BH CV ECHO MEAS - LVOT AREA: 2.5 CM2
BH CV ECHO MEAS - LVOT DIAM: 1.8 CM
BH CV ECHO MEAS - LVPWD: 1.17 CM
BH CV ECHO MEAS - MED PEAK E' VEL: 5.4 CM/SEC
BH CV ECHO MEAS - MV A MAX VEL: 79.7 CM/SEC
BH CV ECHO MEAS - MV DEC TIME: 0.21 SEC
BH CV ECHO MEAS - MV E MAX VEL: 70.3 CM/SEC
BH CV ECHO MEAS - MV E/A: 0.88
BH CV ECHO MEAS - PA V2 MAX: 214 CM/SEC
BH CV ECHO MEAS - RAP SYSTOLE: 5 MMHG
BH CV ECHO MEAS - RVSP: 39.6 MMHG
BH CV ECHO MEAS - SV(LVOT): 75.6 ML
BH CV ECHO MEAS - SV(MOD-SP2): 47.6 ML
BH CV ECHO MEAS - SV(MOD-SP4): 75.1 ML
BH CV ECHO MEAS - SVI(LVOT): 40.7 ML/M2
BH CV ECHO MEAS - SVI(MOD-SP2): 25.6 ML/M2
BH CV ECHO MEAS - SVI(MOD-SP4): 40.4 ML/M2
BH CV ECHO MEAS - TR MAX PG: 34.6 MMHG
BH CV ECHO MEAS - TR MAX VEL: 294 CM/SEC
BH CV ECHO MEASUREMENTS AVERAGE E/E' RATIO: 7.94
BH CV XLRA - RV BASE: 4.3 CM
BH CV XLRA - RV LENGTH: 5.1 CM
BH CV XLRA - RV MID: 2.39 CM
BUN SERPL-MCNC: 19 MG/DL (ref 6–20)
BUN/CREAT SERPL: 22.4 (ref 7–25)
CALCIUM SPEC-SCNC: 8.5 MG/DL (ref 8.6–10.5)
CHLORIDE SERPL-SCNC: 108 MMOL/L (ref 98–107)
CO2 SERPL-SCNC: 20 MMOL/L (ref 22–29)
CREAT SERPL-MCNC: 0.85 MG/DL (ref 0.57–1)
DEPRECATED RDW RBC AUTO: 45.6 FL (ref 37–54)
EGFRCR SERPLBLD CKD-EPI 2021: 85.2 ML/MIN/1.73
EOSINOPHIL # BLD AUTO: 0.41 10*3/MM3 (ref 0–0.4)
EOSINOPHIL NFR BLD AUTO: 5.7 % (ref 0.3–6.2)
ERYTHROCYTE [DISTWIDTH] IN BLOOD BY AUTOMATED COUNT: 13.1 % (ref 12.3–15.4)
GLUCOSE SERPL-MCNC: 77 MG/DL (ref 65–99)
HCT VFR BLD AUTO: 37.3 % (ref 34–46.6)
HGB BLD-MCNC: 11.5 G/DL (ref 12–15.9)
IMM GRANULOCYTES # BLD AUTO: 0.05 10*3/MM3 (ref 0–0.05)
IMM GRANULOCYTES NFR BLD AUTO: 0.7 % (ref 0–0.5)
LEFT ATRIUM VOLUME INDEX: 30 ML/M2
LEFT ATRIUM VOLUME: 55.8 ML
LV EF BIPLANE MOD: 71.4 %
LYMPHOCYTES # BLD AUTO: 1.24 10*3/MM3 (ref 0.7–3.1)
LYMPHOCYTES NFR BLD AUTO: 17.3 % (ref 19.6–45.3)
MCH RBC QN AUTO: 29.4 PG (ref 26.6–33)
MCHC RBC AUTO-ENTMCNC: 30.8 G/DL (ref 31.5–35.7)
MCV RBC AUTO: 95.4 FL (ref 79–97)
MONOCYTES # BLD AUTO: 0.63 10*3/MM3 (ref 0.1–0.9)
MONOCYTES NFR BLD AUTO: 8.8 % (ref 5–12)
NEUTROPHILS NFR BLD AUTO: 4.79 10*3/MM3 (ref 1.7–7)
NEUTROPHILS NFR BLD AUTO: 66.8 % (ref 42.7–76)
NRBC BLD AUTO-RTO: 0 /100 WBC (ref 0–0.2)
PLATELET # BLD AUTO: 248 10*3/MM3 (ref 140–450)
PMV BLD AUTO: 11.1 FL (ref 6–12)
POTASSIUM SERPL-SCNC: 3.6 MMOL/L (ref 3.5–5.2)
QT INTERVAL: 524 MS
QTC INTERVAL: 477 MS
RBC # BLD AUTO: 3.91 10*6/MM3 (ref 3.77–5.28)
SODIUM SERPL-SCNC: 140 MMOL/L (ref 136–145)
WBC NRBC COR # BLD AUTO: 7.17 10*3/MM3 (ref 3.4–10.8)

## 2025-02-15 PROCEDURE — 94799 UNLISTED PULMONARY SVC/PX: CPT

## 2025-02-15 PROCEDURE — 93306 TTE W/DOPPLER COMPLETE: CPT

## 2025-02-15 PROCEDURE — 80048 BASIC METABOLIC PNL TOTAL CA: CPT | Performed by: INTERNAL MEDICINE

## 2025-02-15 PROCEDURE — 93306 TTE W/DOPPLER COMPLETE: CPT | Performed by: HOSPITALIST

## 2025-02-15 PROCEDURE — 94660 CPAP INITIATION&MGMT: CPT

## 2025-02-15 PROCEDURE — 94761 N-INVAS EAR/PLS OXIMETRY MLT: CPT

## 2025-02-15 PROCEDURE — 85025 COMPLETE CBC W/AUTO DIFF WBC: CPT | Performed by: INTERNAL MEDICINE

## 2025-02-15 PROCEDURE — G0378 HOSPITAL OBSERVATION PER HR: HCPCS

## 2025-02-15 PROCEDURE — 25510000001 PERFLUTREN 6.52 MG/ML SUSPENSION: Performed by: INTERNAL MEDICINE

## 2025-02-15 RX ORDER — ALBUTEROL SULFATE 90 UG/1
2 INHALANT RESPIRATORY (INHALATION) EVERY 4 HOURS PRN
COMMUNITY

## 2025-02-15 RX ORDER — SODIUM CHLORIDE 0.9 % (FLUSH) 0.9 %
10 SYRINGE (ML) INJECTION AS NEEDED
OUTPATIENT
Start: 2025-02-15

## 2025-02-15 RX ORDER — CLOPIDOGREL BISULFATE 75 MG/1
75 TABLET ORAL DAILY
Status: DISCONTINUED | OUTPATIENT
Start: 2025-02-15 | End: 2025-02-16 | Stop reason: HOSPADM

## 2025-02-15 RX ORDER — FLUTICASONE PROPIONATE 44 UG/1
1 AEROSOL, METERED RESPIRATORY (INHALATION)
COMMUNITY
End: 2025-02-16 | Stop reason: HOSPADM

## 2025-02-15 RX ORDER — DOCUSATE SODIUM 100 MG/1
100 CAPSULE, LIQUID FILLED ORAL 3 TIMES DAILY PRN
COMMUNITY

## 2025-02-15 RX ORDER — SODIUM CHLORIDE 0.9 % (FLUSH) 0.9 %
10 SYRINGE (ML) INJECTION EVERY 12 HOURS SCHEDULED
OUTPATIENT
Start: 2025-02-15

## 2025-02-15 RX ORDER — CYANOCOBALAMIN 1000 UG/ML
1000 INJECTION, SOLUTION INTRAMUSCULAR; SUBCUTANEOUS
COMMUNITY

## 2025-02-15 RX ORDER — SODIUM CHLORIDE 9 MG/ML
40 INJECTION, SOLUTION INTRAVENOUS AS NEEDED
OUTPATIENT
Start: 2025-02-15

## 2025-02-15 RX ORDER — HYDROXYZINE HYDROCHLORIDE 50 MG/1
50 TABLET, FILM COATED ORAL 2 TIMES DAILY
COMMUNITY

## 2025-02-15 RX ORDER — CLOPIDOGREL BISULFATE 75 MG/1
75 TABLET ORAL DAILY
COMMUNITY

## 2025-02-15 RX ADMIN — TOPIRAMATE 100 MG: 100 TABLET, FILM COATED ORAL at 20:34

## 2025-02-15 RX ADMIN — PERFLUTREN 9.78 MG: 6.52 INJECTION, SUSPENSION INTRAVENOUS at 14:36

## 2025-02-15 RX ADMIN — Medication 10 ML: at 20:34

## 2025-02-15 RX ADMIN — HYDROXYZINE HYDROCHLORIDE 50 MG: 25 TABLET ORAL at 08:38

## 2025-02-15 RX ADMIN — BUDESONIDE AND FORMOTEROL FUMARATE DIHYDRATE 2 PUFF: 160; 4.5 AEROSOL RESPIRATORY (INHALATION) at 07:37

## 2025-02-15 RX ADMIN — TOPIRAMATE 100 MG: 100 TABLET, FILM COATED ORAL at 08:38

## 2025-02-15 RX ADMIN — APIXABAN 5 MG: 5 TABLET, FILM COATED ORAL at 20:33

## 2025-02-15 RX ADMIN — MONTELUKAST SODIUM 10 MG: 10 TABLET, COATED ORAL at 20:33

## 2025-02-15 RX ADMIN — MEMANTINE 10 MG: 5 TABLET ORAL at 08:57

## 2025-02-15 RX ADMIN — IPRATROPIUM BROMIDE 0.5 MG: 0.5 SOLUTION RESPIRATORY (INHALATION) at 07:37

## 2025-02-15 RX ADMIN — APIXABAN 5 MG: 5 TABLET, FILM COATED ORAL at 08:40

## 2025-02-15 RX ADMIN — EMPAGLIFLOZIN 25 MG: 25 TABLET, FILM COATED ORAL at 08:38

## 2025-02-15 RX ADMIN — ASPIRIN 81 MG: 81 TABLET, COATED ORAL at 08:40

## 2025-02-15 RX ADMIN — MEMANTINE 10 MG: 5 TABLET ORAL at 20:33

## 2025-02-15 RX ADMIN — CLOPIDOGREL BISULFATE 75 MG: 75 TABLET ORAL at 08:38

## 2025-02-15 RX ADMIN — Medication 10 ML: at 08:57

## 2025-02-15 RX ADMIN — FLUTICASONE PROPIONATE 2 SPRAY: 50 SPRAY, METERED NASAL at 08:38

## 2025-02-15 RX ADMIN — ESCITALOPRAM OXALATE 20 MG: 10 TABLET ORAL at 08:40

## 2025-02-15 RX ADMIN — FAMOTIDINE 40 MG: 20 TABLET, FILM COATED ORAL at 08:38

## 2025-02-15 RX ADMIN — IPRATROPIUM BROMIDE 0.5 MG: 0.5 SOLUTION RESPIRATORY (INHALATION) at 19:06

## 2025-02-15 RX ADMIN — BUDESONIDE AND FORMOTEROL FUMARATE DIHYDRATE 2 PUFF: 160; 4.5 AEROSOL RESPIRATORY (INHALATION) at 19:06

## 2025-02-15 RX ADMIN — FAMOTIDINE 40 MG: 20 TABLET, FILM COATED ORAL at 20:33

## 2025-02-15 RX ADMIN — IPRATROPIUM BROMIDE 0.5 MG: 0.5 SOLUTION RESPIRATORY (INHALATION) at 10:36

## 2025-02-15 RX ADMIN — TRAMADOL HYDROCHLORIDE 50 MG: 50 TABLET, COATED ORAL at 11:59

## 2025-02-15 RX ADMIN — HYDROXYZINE HYDROCHLORIDE 50 MG: 25 TABLET ORAL at 20:33

## 2025-02-15 RX ADMIN — IPRATROPIUM BROMIDE 0.5 MG: 0.5 SOLUTION RESPIRATORY (INHALATION) at 13:43

## 2025-02-15 RX ADMIN — FERROUS SULFATE TAB 325 MG (65 MG ELEMENTAL FE) 325 MG: 325 (65 FE) TAB at 08:40

## 2025-02-15 NOTE — CONSULTS
"EP CONSULT NOTE    Subjective        History of Present Illness    EP Problems:  1.  Paroxysmal atrial fibrillation   2.  Sinus bradycardia, drug-induced  3.  Occasional PVCs  4.  Persistent left-sided SVC    Cardiology Problems:  1.  Congenital heart disease  2.  Congenital pulmonic valve stenosis   - ~1979: Surgical pulmonic valve repair   -7/2024: Transcatheter PVR  3.  Hypertension    Medical Problems:  1.  GERD  2.  Migraine disorder  3.  Depression  4.  Optic chiasm glioma  5.  Asthma  6.  Anxiety disorder  7.  Neurofibromatosis  8.  Obesity      Sandy Estes is a 49 y.o. female with problem list as above for whom EP is consulted regarding congenital heart disease, paroxysmal atrial fibrillation, drug-induced bradycardia.  She was first diagnosed with atrial fibrillation in January 2024.  She was initially treated with sotalol.  She was ultimately readmitted to the hospital in February 2024 for recurrent arrhythmias and underwent direct-current cardioversion.  Her sotalol ultimately had to be discontinued due to QT prolongation.  She had done reasonably well with the atrial fibrillation since that time.    She presents to the hospital this visit with palpitations, lightheadedness, dizziness.  She was at home and had sudden onset of symptoms starting around 4 AM.  She came to the ER for an evaluation at which time she was found to have sinus bradycardia.  She was admitted to the hospital.  Telemetry monitoring has shown no significant arrhythmias aside from sinus bradycardia.    Objective   Vital Signs:  /53 (BP Location: Left arm, Patient Position: Lying)   Pulse 57   Temp 98.1 °F (36.7 °C) (Oral)   Resp 18   Ht 157.5 cm (62\")   Wt 84.8 kg (187 lb)   SpO2 97%   BMI 34.20 kg/m²   Estimated body mass index is 34.2 kg/m² as calculated from the following:    Height as of this encounter: 157.5 cm (62\").    Weight as of this encounter: 84.8 kg (187 lb).      Physical Exam  Vitals reviewed. "   Constitutional:       Appearance: She is obese.   Cardiovascular:      Rate and Rhythm: Normal rate and regular rhythm.      Pulses: Normal pulses.      Heart sounds: Murmur heard.   Pulmonary:      Effort: Pulmonary effort is normal. No respiratory distress.      Breath sounds: Normal breath sounds.   Skin:     General: Skin is warm and dry.   Neurological:      General: No focal deficit present.      Mental Status: She is alert.   Psychiatric:         Mood and Affect: Mood normal.         Judgment: Judgment normal.          Result Review :  The following data was reviewed by: Amber Tinsley MD on 02/14/2025:  CMP          1/25/2025    07:49 2/14/2025    17:38 2/15/2025    03:40   CMP   Glucose 97  83  77    BUN 17  19  19    Creatinine 0.79  1.01  0.85    EGFR 93.0  69.2  85.2    Sodium 139  139  140    Potassium 3.9  3.9  3.6    Chloride 107  106  108    Calcium 8.9  9.3  8.5    Total Protein 6.8  7.1     Albumin 3.8  3.9     Globulin 3.0  3.2     Total Bilirubin 0.2  0.4     Alkaline Phosphatase 73  75     AST (SGOT) 16  21     ALT (SGPT) 13  16     Albumin/Globulin Ratio 1.3  1.2     BUN/Creatinine Ratio 21.5  18.8  22.4    Anion Gap 9.0  10.0  12.0      CBC          1/25/2025    07:49 2/14/2025    17:38 2/15/2025    03:40   CBC   WBC 7.58  8.18  7.17    RBC 4.05  4.17  3.91    Hemoglobin 11.9  12.3  11.5    Hematocrit 38.5  39.3  37.3    MCV 95.1  94.2  95.4    MCH 29.4  29.5  29.4    MCHC 30.9  31.3  30.8    RDW 13.4  13.0  13.1    Platelets 223  283  248      TSH          1/25/2025    07:49   TSH   TSH 6.560      Chest x-ray from yesterday directly visualized independently reviewed: Sternotomy wires are present, cardiomegaly, mesh repair of the RVOT      [id number] SCORE   [id number] Score: 3 (2/15/2025  3:05 PM)             Assessment and Plan   Problems:  Paroxysmal atrial fibrillation  Drug-induced bradycardia  Palpitations    Sandy Estes is a 49 y.o. female with problem list as above for whom  EP is consulted regarding paroxysmal atrial fibrillation, drug-induced bradycardia, palpitations.    Unclear what the episode yesterday was.  Perhaps PVCs in combination of bradycardia causing dizziness.  Perhaps a short episode of paroxysmal atrial fibrillation.  Regardless, she has previously been treated with sotalol for atrial fibrillation which had to be discontinued.  She is significantly bradycardic now and I do not see any good antiarrhythmic treatment options to add at this point.  I do think that atrial fibrillation ablation is going to be beneficial for her to try to get her off of metoprolol long-term.  As such, we discussed the risks and benefits of this procedure and she would like to proceed.  This would be done in the outpatient setting.  We did discuss that her congenital heart disease does change her risk profile somewhat for the procedure, however at this time, I do not see any major contraindications to proceeding with a PVI.    Plan:  -Schedule for pulmonary vein isolation as an outpatient  -Continue metoprolol at current dose  -2-week Holter monitor at discharge  -Continue apixaban for anticoagulation  -Continue plavix for now given PVR  -Stop aspirin given triple therapy  -Will arrange outpatient follow-up in EP clinic               Part of this note may be an electronic transcription/translation of spoken language to printed text using the Dragon Dictation System.

## 2025-02-15 NOTE — PROGRESS NOTES
Patient Name: Sandy Estes  Date of Admission: 2/14/2025  Today's Date: 02/15/25  Length of Stay: 0  Primary Care Physician: Kenneth Kolb MD    Subjective   Chief Complaint: Lightheadedness, headache, palpitations  HPI   Sandy Estes is a 49-year-old woman with a past medical history of congenital heart disease-pulmonary valve disease s/p pulmonary valve replacement in July 2024 at [facility], atrial fibrillation on anticoagulation with Eliquis, aspirin and Plavix, essential hypertension, GERD, migraine, and depression. She presents with complaints of sudden onset of lightheadedness, palpitations and headache that started this morning around 4 AM.     Patient developed lightheadedness today around 4 AM the morning with associated palpitations, nausea and dizziness. Lightheadedness was present both on lying down and on getting up and ambulating. She denied chest pain or shortness of breath but had some headache.  She denies fevers or chills. She states she did not take any of her home medications today due to her symptoms. In the ER, she had an EKG that showed sinus bradycardia. A chest x-ray showed no acute abnormality.  CBC, BMP and troponin were unremarkable. D-dimer was negative.  proBNP was slightly elevated at 858. TSH was slightly elevated at 6.5 but free T4 was normal at 1.01. She was recommended for admission.    Today:   Patient is resting comfortably in bed on room air with no family at bedside.  Patient states she continues to have dizziness however headache and palpitations have resolved.  Patient states that her normal resting heart rate is high 50s low 60s so the heart rate is not necessarily new however she is not usually symptomatic with heart rate.  Additionally stated that she is noncompliant with her home CPAP.  Will order CPAP for night use here as she has no one to bring her home device.  Cardiology consult changed to cardiac electrophysiology, as she has a  scheduled appointment with him in March to discuss sinus bradycardia, per KEI Roberts, cardiology awaiting Dr. Tinsley's review.  If patient's symptoms continue to improve and Dr. Tinsley feels patient is stable she will be discharged home possibly with a cardiac monitor.  Ancillary note patient takes Namenda for chronic migraines    7/29/2024)  TPV 25 [place] valve        9/03/2024)  Echocardiogram    -The left ventricle is qualitatively normal in size, with mild left   ventricular hypertrophy and normal systolic function (LVEF ~65%)   -Right ventricle is qualitatively at least mildly enlarged in size (similar   to prior), with normal wall thickness and qualitatively normal systolic   function.   -The RVOT, [place] bioprosthetic pulmonic valve and pulmonary arteries aer   not well seen. Limited Color-flow and spectral Doppler assessment measures   only mild stenosis (pk/mn gradients 20/12 mmHg), but no significant   insufficiency.   -Trivial tricuspid valve regurgitation noted. The tricuspid valve   regurgitant peak velocity by Doppler is 3.09 m/s with a right ventricular   systolic pressure estimated at 38 mmHg plus RAp (Consistent with either mild   residual RVOT/pulmonary valve stenosis or mild to moderate pulmonary   hypertension).   -There is left atrial enlargement present, stable.   -There appears to be a dilated coronary sinus, likely secondary to a left   SVC (normal variant).   -No pericardial effusion, vegetations or thrombi seen.               Documented weights    02/14/25 1541 02/14/25 1958   Weight: 83.9 kg (185 lb) 84.8 kg (187 lb)          Intake/Output Summary (Last 24 hours) at 2/15/2025 1141  Last data filed at 2/14/2025 2343  Gross per 24 hour   Intake 1240 ml   Output --   Net 1240 ml            Review of Systems   All pertinent negatives and positives are as above. All other systems have been reviewed and are negative unless otherwise stated.     Objective    Temp:  [98.1 °F (36.7 °C)-98.9  °F (37.2 °C)] 98.8 °F (37.1 °C)  Heart Rate:  [49-73] 54  Resp:  [14-20] 18  BP: ()/(47-60) 113/47  Physical Exam  Vitals and nursing note reviewed.   Constitutional:       General: She is not in acute distress.     Appearance: She is obese. She is not ill-appearing or toxic-appearing.   HENT:      Right Ear: Tympanic membrane normal.      Left Ear: Tympanic membrane normal.      Nose: Nose normal. No congestion or rhinorrhea.      Mouth/Throat:      Mouth: Mucous membranes are moist.      Pharynx: Oropharynx is clear.   Eyes:      Pupils: Pupils are equal, round, and reactive to light.   Cardiovascular:      Rate and Rhythm: Bradycardia present. Rhythm irregular.      Pulses: Normal pulses.      Heart sounds: Normal heart sounds.   Pulmonary:      Effort: Pulmonary effort is normal.      Breath sounds: Normal breath sounds.   Abdominal:      General: Abdomen is protuberant. Bowel sounds are normal. There is no distension.      Palpations: Abdomen is soft.      Tenderness: There is no abdominal tenderness.   Musculoskeletal:         General: Normal range of motion.      Cervical back: Normal range of motion and neck supple. No rigidity or tenderness.      Right lower leg: No edema.      Left lower leg: No edema.   Skin:     General: Skin is warm.      Capillary Refill: Capillary refill takes less than 2 seconds.      Coloration: Skin is pale.   Neurological:      General: No focal deficit present.      Mental Status: She is oriented to person, place, and time.   Psychiatric:         Mood and Affect: Mood normal.         Behavior: Behavior normal.         Thought Content: Thought content normal.         Judgment: Judgment normal.       Results Review:  I have reviewed the labs, radiology results, and diagnostic studies.    Laboratory Data:   Results from last 7 days   Lab Units 02/15/25  0340 02/14/25  1738   WBC 10*3/mm3 7.17 8.18   HEMOGLOBIN g/dL 11.5* 12.3   HEMATOCRIT % 37.3 39.3   PLATELETS 10*3/mm3 248  283        Results from last 7 days   Lab Units 02/15/25  0340 02/14/25  1738   SODIUM mmol/L 140 139   POTASSIUM mmol/L 3.6 3.9   CHLORIDE mmol/L 108* 106   CO2 mmol/L 20.0* 23.0   BUN mg/dL 19 19   CREATININE mg/dL 0.85 1.01*   CALCIUM mg/dL 8.5* 9.3   BILIRUBIN mg/dL  --  0.4   ALK PHOS U/L  --  75   ALT (SGPT) U/L  --  16   AST (SGOT) U/L  --  21   GLUCOSE mg/dL 77 83       Culture Data:     Microbiology Results (last 10 days)       ** No results found for the last 240 hours. **             Radiology Data:   Imaging Results (Last 7 Days)       Procedure Component Value Units Date/Time    XR Chest 1 View [[id number]] Collected: 02/14/25 1711     Updated: 02/14/25 1721    Narrative:      EXAMINATION:  XR CHEST 1 VW-  2/14/2025 3:47 PM     HISTORY: Weakness and dizziness. Altered mental status triage protocol.     COMPARISON: 5/31/2022.     TECHNIQUE: Single view AP image.     FINDINGS: Minimal hazy density at the right lung base medially is  present on the prior study and probably a fat pad. The lungs are clear.  Mild bronchial wall thickening is stable. Heart size is upper limits of  normal. Prior median sternotomy. There is a radiopaque wire/meshlike  device overlying the upper cardiac silhouette. No acute bony abnormality  is seen.          Impression:      1. No acute appearing infiltrate or effusion.  2. Mild bronchial wall thickening, stable.  3. Heart size upper limits of normal. Prior median sternotomy.  Radiopaque wire/mesh like device overlying the upper cardiac silhouette.  The etiology for this is not clear. It could potentially represent some  type of left atrial appendage closure device. Correlate clinically. I  suppose this could be an artifact in clothing as well.           This report was signed and finalized on 2/14/2025 5:18 PM by Dr. Josh Garsia MD.                I have reviewed the patient's current medications.     apixaban, 5 mg, Oral, BID  aspirin, 81 mg, Oral,  Daily  budesonide-formoterol, 2 puff, Inhalation, BID - RT   And  ipratropium, 0.5 mg, Nebulization, 4x Daily - RT  clopidogrel, 75 mg, Oral, Daily  empagliflozin, 25 mg, Oral, Daily  escitalopram, 20 mg, Oral, Daily  famotidine, 40 mg, Oral, BID  ferrous sulfate, 325 mg, Oral, Daily With Breakfast  fluticasone, 2 spray, Each Nare, Daily  hydrOXYzine, 50 mg, Oral, BID  memantine, 10 mg, Oral, BID  montelukast, 10 mg, Oral, Nightly  sodium chloride, 10 mL, Intravenous, Q12H  topiramate, 100 mg, Oral, Q12H            Assessment/Plan   Assessment  Active Hospital Problems    Diagnosis     **Bradycardia     History of pulmonic valve replacement, [facility] 7/29/24     GABRIELLE (obstructive sleep apnea)     Palpitations     Adult congenital heart disease     Hypertension     Anxiety        Treatment Plan    1.  Bradycardia/palpitations-continue cardiac telemetry, overnight cardiac telemetry revealed SB/S 51-63.  Cardiology consult discontinued per Dr. Galindo, initiated electrophysiology consult per , pending.    2.  History of pulmonic valve replacement/adult congenital heart disease-currently stable, most recent interventions in HPI.  Repeat echocardiogram pending    3.  Hypertension-4 vital signs, continue to hold home metoprolol.    4.  Anxiety-continue home Atarax, notify provider of any new or worsening symptoms    5.  GABRIELLE-patient admits noncompliance to CPAP use, will initiate CPAP nightly.    Medical Decision Making  1 acute on chronic, high complexity, unchanged  3 chronic, moderate complexity, unchanged      Number and Complexity of problems: 5  Differential Diagnosis: None    Conditions and Status        Condition is unchanged.     [facility] Data  External documents reviewed: Epic review of patient's extensive cardiac and pulmonary history, office notes and procedures  Cardiac tracing (EKG, telemetry) interpretation: 2/15/2025 EKG per cardiology reviewed, overnight cardiac telemetry SB/S50 1-63.  Radiology  interpretation: 2/14.2025 x-ray per radiology reviewed  Labs reviewed: 2/14/2025 BC, CMP, D-dimer, troponin, BNP, TSH, T4, magnesium, 2/15/2024 BMP, CBC reviewed, repeat labs in a.m.  Any tests that were considered but not ordered: None     Decision rules/scores evaluated (example [id number], Wells, etc): None     Discussed with: Dr. Ospina, Dr. Tinsley, cardiac electrophysiology, and patient     Care Planning  Shared decision making: Dr. Ospina, Dr. Tinsley, cardiac electrophysiology, and patient  Code status and discussions: Full code per patient  Surrogate Decision Maker None determined at this time    Disposition  Social Determinants of Health that impact treatment or disposition: Undetermined at this time  I expect the patient to be discharged to home in 1-2 days.     Electronically signed by JOSE Caba, 02/15/25, 11:41 CST.

## 2025-02-15 NOTE — ED NOTES
"Nursing report ED to floor  Sandy Estes  49 y.o.  female    HPI:   Chief Complaint   Patient presents with    Abnormal ECG    Dizziness       Admitting doctor:   Stephanie Del Valle MD    Consulting provider(s):  Consults       No orders found from 1/16/2025 to 2/15/2025.             Admitting diagnosis:   The encounter diagnosis was Palpitations.    Code status:   Current Code Status       Date Active Code Status Order ID Comments User Context       Not on file            Allergies:   Penicillin g, Penicillins, Viibryd [vilazodone hcl], Amoxil [amoxicillin], Biaxin [clarithromycin], Cefzil [cefprozil], Clindamycin/lincomycin, Doxycycline, Lisinopril, Sulfa antibiotics, Sulfasalazine, Vancomycin, and Zofran [ondansetron hcl]    Intake and Output    Intake/Output Summary (Last 24 hours) at 2/14/2025 1937  Last data filed at 2/14/2025 1832  Gross per 24 hour   Intake 1000 ml   Output --   Net 1000 ml       Weight:       02/14/25  1541   Weight: 83.9 kg (185 lb)       Most recent vitals:   Vitals:    02/14/25 1541 02/14/25 1808 02/14/25 1849   BP: 109/58 105/50 120/60   BP Location:  Left arm Right arm   Patient Position:  Lying Sitting   Pulse: (!) 49 50 53   Resp: 20 14 14   Temp: 98.1 °F (36.7 °C)     TempSrc: Oral     SpO2: 100% 99% 99%   Weight: 83.9 kg (185 lb)     Height: 157.5 cm (62\")       Oxygen Therapy: .    Active LDAs/IV Access:   Lines, Drains & Airways       Active LDAs       Name Placement date Placement time Site Days    Peripheral IV 06/03/22 1408 Right Antecubital 06/03/22  1408  Antecubital  987                    Labs (abnormal labs have a star):   Labs Reviewed   COMPREHENSIVE METABOLIC PANEL - Abnormal; Notable for the following components:       Result Value    Creatinine 1.01 (*)     All other components within normal limits    Narrative:     GFR Categories in Chronic Kidney Disease (CKD)      GFR Category          GFR (mL/min/1.73)    Interpretation  G1                     90 or " greater         Normal or high (1)  G2                      60-89                Mild decrease (1)  G3a                   45-59                Mild to moderate decrease  G3b                   30-44                Moderate to severe decrease  G4                    15-29                Severe decrease  G5                    14 or less           Kidney failure          (1)In the absence of evidence of kidney disease, neither GFR category G1 or G2 fulfill the criteria for CKD.    eGFR calculation 2021 CKD-EPI creatinine equation, which does not include race as a factor   URINALYSIS W/ MICROSCOPIC IF INDICATED (NO CULTURE) - Abnormal; Notable for the following components:    Color, UA Dark Yellow (*)     Glucose, UA >=1000 mg/dL (3+) (*)     All other components within normal limits    Narrative:     Urine microscopic not indicated.   CBC WITH AUTO DIFFERENTIAL - Abnormal; Notable for the following components:    MCHC 31.3 (*)     Lymphocyte % 14.8 (*)     Eosinophils, Absolute 0.43 (*)     All other components within normal limits   TROPONIN - Normal    Narrative:     High Sensitive Troponin T Reference Range:  <14.0 ng/L- Negative Female for AMI  <22.0 ng/L- Negative Male for AMI  >=14 - Abnormal Female indicating possible myocardial injury.  >=22 - Abnormal Male indicating possible myocardial injury.   Clinicians would have to utilize clinical acumen, EKG, Troponin, and serial changes to determine if it is an Acute Myocardial Infarction or myocardial injury due to an underlying chronic condition.        MAGNESIUM - Normal   BLOOD CULTURE   BLOOD CULTURE   RAINBOW DRAW    Narrative:     The following orders were created for panel order [place] Draw.  Procedure                               Abnormality         Status                     ---------                               -----------         ------                     Green Top (Gel)[[id number]]                                  Final result               Lavender  Top[[id number]]                                     Final result               Red Top[[id number]]                                          Final result               Light Blue Top[[id number]]                                   Final result                 Please view results for these tests on the individual orders.   HIGH SENSITIVITIY TROPONIN T 1HR   POCT GLUCOSE FINGERSTICK   CBC AND DIFFERENTIAL    Narrative:     The following orders were created for panel order CBC & Differential.  Procedure                               Abnormality         Status                     ---------                               -----------         ------                     CBC Auto Differential[[id number]]        Abnormal            Final result                 Please view results for these tests on the individual orders.   GREEN TOP   LAVENDER TOP   RED TOP   LIGHT BLUE TOP       Meds given in ED:   Medications   sodium chloride 0.9 % flush 10 mL (has no administration in time range)   sodium chloride 0.9 % bolus 1,000 mL (0 mL Intravenous Stopped 2/14/25 1832)   metoclopramide (REGLAN) injection 10 mg (10 mg Intravenous Given 2/14/25 1803)           NIH Stroke Scale:       Isolation/Infection(s):  No active isolations   No active infections     COVID Testing  Collected .  Resulted .    Nursing report ED to floor:  Mental status: .  Ambulatory status: .  Precautions: .    ED nurse phone extentsion- ..

## 2025-02-15 NOTE — H&P
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 She presents with complaints of sudden onset of lightheadedness, palpitations and headache that started this morning around 4 AM.    Patient developed lightheadedness today around 4 AM the morning with associated palpitations, nausea and dizziness. Lightheadedness was present both on lying down and on getting up and ambulating. She denied chest pain or shortness of breath but had some headache.  She denies fevers or chills. She states she did not take any of her home medications today due to her symptoms. In the ER, she had an EKG that showed sinus bradycardia. A chest x-ray showed no acute abnormality.  CBC, BMP and troponin were unremarkable. D-dimer was negative.  proBNP was slightly elevated at 858. TSH was slightly elevated at 6.5 but free T4 was normal at 1.01. She was recommended for admission.    Review of Systems   Otherwise complete ROS reviewed and negative except as mentioned in the HPI.    Past Medical History:   Past Medical History:   Diagnosis Date    Anxiety     Asthma     Brain tumor, glioma     Optic chiasm    CHF (congestive heart failure)     Depression     GERD (gastroesophageal reflux disease)     Headache     Hyperlipidemia     Hypertension     Migraine     Neurofibromatosis     6 months old dx     Past Surgical History:  Past Surgical History:   Procedure Laterality Date     CARDIAC SURGERY      COLONOSCOPY      ENDOSCOPIC FUNCTIONAL SINUS SURGERY (FESS) Bilateral 10/09/2020    Procedure: ENDOSCOPIC FUNCTIONAL SINUS SURGERY W TRACKING, BILATERAL MAXILLARY AND LEFT SPHENOID SINUPLASTY,  RESECT INFERIOR TURBINATES VIA COBLATION;  Surgeon: Bairon Ramirez MD;  Location: [facility];  Service: ENT;  Laterality: Bilateral;    ENDOSCOPY      GALLBLADDER SURGERY      HYSTERECTOMY      OTHER SURGICAL HISTORY      repaired infundibular & valvular PS: res. trivial PS and Mild to Mod. PI (age 8)    TONSILLECTOMY AND ADENOIDECTOMY       Social History:  reports that she quit smoking about 12 years ago. Her smoking use included cigarettes. She started smoking about 22 years ago. She has a 5 pack-year smoking history. She has never used smokeless tobacco. She reports that she does not drink alcohol and does not use drugs.    Family History: family history includes Breast cancer in her mother; Dementia in her maternal grandmother; Diabetes in her father; Heart disease in her father; Hypertension in her father; Migraines in her father; Neurofibromatosis in her maternal grandfather and mother; Neuropathy in her mother; Stroke in her maternal grandfather.      Allergies:  Allergies   Allergen Reactions    Penicillin G Shortness Of Breath     Other reaction(s): Unknown    Penicillins Shortness Of Breath    Viibryd [Vilazodone Hcl] Nausea And Vomiting and Other (See Comments)     fatigue    Amoxil [Amoxicillin] Hives    Biaxin [Clarithromycin] Hives    Cefzil [Cefprozil] Hives    Clindamycin/Lincomycin Hives    Doxycycline Hives    Lisinopril Other (See Comments)     Itchy throat, clearing throat a lot with this med    Sulfa Antibiotics Hives    Sulfasalazine Hives    Vancomycin Hives    Zofran [Ondansetron Hcl] Nausea And Vomiting       Medications:  Prior to Admission medications    Medication Sig Start Date End Date Taking? Authorizing Provider   aspirin 81 MG EC tablet Take 1 tablet by mouth Daily.     Sadia Bradford MD   Budeson-Glycopyrrol-Formoterol (Breztri Aerosphere) 160-9-4.8 MCG/ACT aerosol inhaler Inhale 2 puffs 2 (Two) Times a Day.    Sadia Bradford MD   cyanocobalamin 1000 MCG/ML injection Inject 1 mL every month by subcutaneous route. 9/18/24   Sadia Bradford MD   Eliquis 5 MG tablet tablet Take 1 tablet by mouth 2 (Two) Times a Day. 1/25/24   Sadia Bradford MD   escitalopram (LEXAPRO) 20 MG tablet Take 1 tablet by mouth Daily. 11/19/20   Sadia Bradford MD   famotidine (PEPCID) 40 MG tablet Take 1 tablet by mouth 2 (Two) Times a Day.    Sadia Bradford MD   Farxiga 10 MG tablet Take 10 mg by mouth Daily.    Sadia Bradford MD   fenofibrate (TRICOR) 145 MG tablet Take 1 tablet by mouth Daily. 12/14/22   Sadia Bradford MD   ferrous sulfate 325 (65 FE) MG tablet Take 1 tablet by mouth Daily With Breakfast.    Sadia Bradford MD   fluticasone (FLONASE) 50 MCG/ACT nasal spray 2 sprays by Each Nare route Daily.    Sadia Bradford MD   fluticasone (Flovent HFA) 44 MCG/ACT inhaler     Sadia Bradford MD   galcanezumab-gnlm (Emgality) 120 MG/ML auto-injector pen Inject 1 mL under the skin into the appropriate area as directed Every 30 (Thirty) Days. 1/6/25   Ranjeet Mathias PA   hydrOXYzine (ATARAX) 50 MG tablet Take 1 tablet by mouth Every 4 (Four) Hours As Needed. 11/24/20   Sadia Bradford MD   losartan (COZAAR) 100 MG tablet Take 1 tablet by mouth Daily.    Sadia Bradford MD   memantine (NAMENDA) 10 MG tablet Take 1 tablet by mouth 2 (Two) Times a Day. 1/6/25   Ranjeet Mathias PA   metoprolol succinate XL (TOPROL-XL) 25 MG 24 hr tablet Take 1 tablet by mouth 2 (Two) Times a Day. 10/13/24   Sadia Bradford MD   montelukast (SINGULAIR) 10 MG tablet Take 1 tablet by mouth Every Night. 11/24/20   Sadia Bradford MD   Rexulti 3 MG tablet Take 1 tablet by mouth Daily.    Provider, MD Sadia  "  rizatriptan MLT (Maxalt-MLT) 10 MG disintegrating tablet Place 1 tablet on the tongue Daily As Needed for Migraine. May repeat in 2 hours if needed 1/6/25   Ranjeet Mathias PA   tamsulosin (FLOMAX) 0.4 MG capsule 24 hr capsule Take 1 capsule by mouth Daily.    Sadia Bradford MD   tiotropium bromide monohydrate (SPIRIVA RESPIMAT) 2.5 MCG/ACT aerosol solution inhaler Inhale 2 puffs Daily.    Sadia Bradford MD   Topiramate ER (Trokendi XR) 200 MG capsule sustained-release 24 hr Take 1 capsule by mouth Daily. 1/6/25   Ranjeet Mathias PA   traMADol (ULTRAM) 50 MG tablet Take 1 tablet by mouth Every 6 (Six) Hours As Needed for Moderate Pain.    Sadia Bradford MD   ubrogepant (Ubrelvy) 100 MG tablet Take 1 tablet by mouth Daily As Needed (migraine). 1/6/25   Ranjeet Mathias PA   vitamin D (ERGOCALCIFEROL) 1.25 MG (45108 UT) capsule capsule Take 1 capsule by mouth Every 7 (Seven) Days. 6/3/21   Sadia Bradford MD   zolpidem (AMBIEN) 10 MG tablet Take 1 tablet by mouth At Night As Needed for Sleep.    Sadia Bradford MD     I have utilized all available immediate resources to obtain, update, or review the patient's current medications (including all prescriptions, over-the-counter products, herbals, cannabis/cannabidiol products, and vitamin/mineral/dietary (nutritional) supplements).    Objective     Vital Signs: /60 (BP Location: Left arm, Patient Position: Sitting)   Pulse 51   Temp 98.1 °F (36.7 °C) (Oral)   Resp 16   Ht 157.5 cm (62\")   Wt 83.9 kg (185 lb)   SpO2 100%   BMI 33.84 kg/m²   Physical Exam  Constitutional:       General: She is not in acute distress.     Appearance: She is not ill-appearing or diaphoretic.   HENT:      Head: Normocephalic and atraumatic.      Right Ear: External ear normal.      Left Ear: External ear normal.      Nose: No congestion or rhinorrhea.      Mouth/Throat:      Mouth: Mucous membranes are moist.      " Pharynx: No oropharyngeal exudate or posterior oropharyngeal erythema.   Eyes:      General: No scleral icterus.     Extraocular Movements: Extraocular movements intact.      Conjunctiva/sclera: Conjunctivae normal.   Cardiovascular:      Rate and Rhythm: Normal rate and regular rhythm. Bradycardia present.      Heart sounds: Normal heart sounds. No murmur heard.  Pulmonary:      Effort: Pulmonary effort is normal. No respiratory distress.      Breath sounds: Normal breath sounds. No wheezing, rhonchi or rales.   Abdominal:      General: Abdomen is flat. There is no distension.      Palpations: Abdomen is soft.      Tenderness: There is no abdominal tenderness. There is no guarding.   Musculoskeletal:         General: No swelling, tenderness or deformity.      Cervical back: Neck supple. No rigidity. No muscular tenderness.      Right lower leg: No edema.      Left lower leg: No edema.   Lymphadenopathy:      Cervical: No cervical adenopathy.   Skin:     General: Skin is warm and dry.   Neurological:      General: No focal deficit present.      Mental Status: She is alert and oriented to person, place, and time.      Cranial Nerves: No cranial nerve deficit.      Motor: No weakness.   Psychiatric:         Mood and Affect: Mood normal.         Behavior: Behavior normal.         Thought Content: Thought content normal.        Results Reviewed:  Lab Results (last 24 hours)       Procedure Component Value Units Date/Time    Blood Culture - Blood, Arm, Right [[id number]] Collected: 02/14/25 1856    Specimen: Blood from Arm, Right Updated: 02/14/25 1919    Comprehensive Metabolic Panel [[id number]]  (Abnormal) Collected: 02/14/25 1738    Specimen: Blood from Arm, Right Updated: 02/14/25 1809     Glucose 83 mg/dL      BUN 19 mg/dL      Creatinine 1.01 mg/dL      Sodium 139 mmol/L      Potassium 3.9 mmol/L      Chloride 106 mmol/L      CO2 23.0 mmol/L      Calcium 9.3 mg/dL      Total Protein 7.1 g/dL      Albumin 3.9 g/dL       ALT (SGPT) 16 U/L      AST (SGOT) 21 U/L      Alkaline Phosphatase 75 U/L      Total Bilirubin 0.4 mg/dL      Globulin 3.2 gm/dL      A/G Ratio 1.2 g/dL      BUN/Creatinine Ratio 18.8     Anion Gap 10.0 mmol/L      eGFR 69.2 mL/min/1.73     Narrative:      GFR Categories in Chronic Kidney Disease (CKD)      GFR Category          GFR (mL/min/1.73)    Interpretation  G1                     90 or greater         Normal or high (1)  G2                      60-89                Mild decrease (1)  G3a                   45-59                Mild to moderate decrease  G3b                   30-44                Moderate to severe decrease  G4                    15-29                Severe decrease  G5                    14 or less           Kidney failure          (1)In the absence of evidence of kidney disease, neither GFR category G1 or G2 fulfill the criteria for CKD.    eGFR calculation 2021 CKD-EPI creatinine equation, which does not include race as a factor    Magnesium [[id number]]  (Normal) Collected: 02/14/25 1738    Specimen: Blood from Arm, Right Updated: 02/14/25 1809     Magnesium 2.2 mg/dL     High Sensitivity Troponin T [[id number]]  (Normal) Collected: 02/14/25 1738    Specimen: Blood from Arm, Right Updated: 02/14/25 1806     HS Troponin T <6 ng/L     Narrative:      High Sensitive Troponin T Reference Range:  <14.0 ng/L- Negative Female for AMI  <22.0 ng/L- Negative Male for AMI  >=14 - Abnormal Female indicating possible myocardial injury.  >=22 - Abnormal Male indicating possible myocardial injury.   Clinicians would have to utilize clinical acumen, EKG, Troponin, and serial changes to determine if it is an Acute Myocardial Infarction or myocardial injury due to an underlying chronic condition.         [place] Draw [[id number]] Collected: 02/14/25 1738    Specimen: Blood from Arm, Right Updated: 02/14/25 1800    Narrative:      The following orders were created for panel order [place] Draw.  Procedure                                Abnormality         Status                     ---------                               -----------         ------                     Green Top (Gel)[[id number]]                                  Final result               Lavender Top[[id number]]                                     Final result               Red Top[[id number]]                                          Final result               Light Blue Top[[id number]]                                   Final result                 Please view results for these tests on the individual orders.    Green Top (Gel) [[id number]] Collected: 02/14/25 1738    Specimen: Blood from Arm, Right Updated: 02/14/25 1800     Extra Tube Hold for add-ons.     Comment: Auto resulted.       Red Top [[id number]] Collected: 02/14/25 1738    Specimen: Blood from Arm, Right Updated: 02/14/25 1800     Extra Tube Hold for add-ons.     Comment: Auto resulted.       Light Blue Top [[id number]] Collected: 02/14/25 1738    Specimen: Blood from Arm, Right Updated: 02/14/25 1800     Extra Tube Hold for add-ons.     Comment: Auto resulted       Lavender Top [[id number]] Collected: 02/14/25 1738    Specimen: Blood from Arm, Right Updated: 02/14/25 1800     Extra Tube hold for add-on     Comment: Auto resulted       Urinalysis With Microscopic If Indicated (No Culture) - Urine, Clean Catch [[id number]]  (Abnormal) Collected: 02/14/25 1739    Specimen: Urine, Clean Catch Updated: 02/14/25 1749     Color, UA Dark Yellow     Appearance, UA Clear     pH, UA 5.5     Specific Gravity, UA 1.029     Glucose, UA >=1000 mg/dL (3+)     Ketones, UA Negative     Bilirubin, UA Negative     Blood, UA Negative     Protein, UA Negative     Leuk Esterase, UA Negative     Nitrite, UA Negative     Urobilinogen, UA 0.2 E.U./dL    Narrative:      Urine microscopic not indicated.    CBC & Differential [[id number]]  (Abnormal) Collected: 02/14/25 1738    Specimen: Blood from Arm, Right Updated: 02/14/25  1745    Narrative:      The following orders were created for panel order CBC & Differential.  Procedure                               Abnormality         Status                     ---------                               -----------         ------                     CBC Auto Differential[[id number]]        Abnormal            Final result                 Please view results for these tests on the individual orders.    CBC Auto Differential [[id number]]  (Abnormal) Collected: 02/14/25 1738    Specimen: Blood from Arm, Right Updated: 02/14/25 1749     WBC 8.18 10*3/mm3      RBC 4.17 10*6/mm3      Hemoglobin 12.3 g/dL      Hematocrit 39.3 %      MCV 94.2 fL      MCH 29.5 pg      MCHC 31.3 g/dL      RDW 13.0 %      RDW-SD 44.6 fl      MPV 11.0 fL      Platelets 283 10*3/mm3      Neutrophil % 71.0 %      Lymphocyte % 14.8 %      Monocyte % 7.9 %      Eosinophil % 5.3 %      Basophil % 0.6 %      Immature Grans % 0.4 %      Neutrophils, Absolute 5.81 10*3/mm3      Lymphocytes, Absolute 1.21 10*3/mm3      Monocytes, Absolute 0.65 10*3/mm3      Eosinophils, Absolute 0.43 10*3/mm3      Basophils, Absolute 0.05 10*3/mm3      Immature Grans, Absolute 0.03 10*3/mm3      nRBC 0.0 /100 WBC           Imaging Results (Last 24 Hours)       Procedure Component Value Units Date/Time    XR Chest 1 View [[id number]] Collected: 02/14/25 1711     Updated: 02/14/25 1721    Narrative:      EXAMINATION:  XR CHEST 1 VW-  2/14/2025 3:47 PM     HISTORY: Weakness and dizziness. Altered mental status triage protocol.     COMPARISON: 5/31/2022.     TECHNIQUE: Single view AP image.     FINDINGS: Minimal hazy density at the right lung base medially is  present on the prior study and probably a fat pad. The lungs are clear.  Mild bronchial wall thickening is stable. Heart size is upper limits of  normal. Prior median sternotomy. There is a radiopaque wire/meshlike  device overlying the upper cardiac silhouette. No acute bony abnormality  is seen.           Impression:      1. No acute appearing infiltrate or effusion.  2. Mild bronchial wall thickening, stable.  3. Heart size upper limits of normal. Prior median sternotomy.  Radiopaque wire/mesh like device overlying the upper cardiac silhouette.  The etiology for this is not clear. It could potentially represent some  type of left atrial appendage closure device. Correlate clinically. I  suppose this could be an artifact in clothing as well.           This report was signed and finalized on 2/14/2025 5:18 PM by Dr. Josh Garsia MD.             I have personally reviewed and interpreted the radiology studies and ECG obtained at time of admission.     Assessment / Plan   Assessment:   Active Hospital Problems    Diagnosis     **Bradycardia     Palpitations    Status post pulmonary valve replacement  Paroxysmal atrial fibrillation  History of migraine    Treatment Plan  The patient will be admitted to my service here at [facility].  She has lightheadedness, bradycardia and palpitations.  She had pulmonary valve replacement in July 2024 and does have paroxysmal atrial fibrillation. She is currently in sinus rhythm.  Will evaluate with an echocardiogram in the morning and hold her home medication of metoprolol for now due to bradycardia.  Hold losartan due to borderline low blood pressures.      Cardiology consultation will be obtained due to her history of congenital heart disease and pulmonary valve replacement. Monitor on telemetry. Patient was supposed to see Dr. Tinsley-electrophysiology in April 2024.    Continue anticoagulation with Eliquis as well as aspirin.  Continue Plavix.    Continue Topamax for migraine    DVT prophylaxis with Eliquis    CODE STATUS is full code    Medical Decision Making  Number and Complexity of problems: 2 acute, moderately severe, moderate complexity medical problem.  Differential Diagnosis: None    Conditions and Status        Condition is unchanged.     MDM  Data  External documents reviewed: None  Cardiac tracing (EKG, telemetry) interpretation: EKG shows sinus bradycardia  Radiology interpretation: Chest x-ray shows no acute abnormality  Labs reviewed: CBC, BMP reviewed by me  Any tests that were considered but not ordered: None     Decision rules/scores evaluated (example [id number], [place], etc): [id number] score 3     Discussed with: Patient     Care Planning  Shared decision making: Patient and Rosalina Magallanesmother  Code status and discussions: CODE STATUS is full code  Disposition  Social Determinants of Health that impact treatment or disposition: None  Estimated length of stay is 1 to 2 days    I confirmed that the patient's advanced care plan is present, code status is documented, and a surrogate decision maker is listed in the patient's medical record.     The patient's surrogate decision maker is Rosalina Lizarraga     The patient was seen and examined by me on 2/14/2025 at 8:10 PM    Electronically signed by Stephanie Del Valle MD, 02/14/25, 19:39 CST.

## 2025-02-15 NOTE — ED PROVIDER NOTES
"EMERGENCY DEPARTMENT ATTENDING NOTE    Patient Name: Sandy Estes    Chief Complaint   Patient presents with    Abnormal ECG    Dizziness       PATIENT PRESENTATION:  Sandy Estes is a 49 y.o. female with PMH significant for hypertension, hyperlipidemia, asthma, GERD, congenital heart disease and a pulm vomit valve replacement she has a [place] transcatheter pulmonic valve that was placed last July and is [employer] brand who presents to the ED with a day of palpitations, lightheadedness, nausea and dizziness when she gets up and moves around.  Patient is on Eliquis and Plavix.  Denies missing any doses of those medications.  Has not had any coughing up blood, fevers, systemic symptoms.      PHYSICAL EXAM:   VS: /50 (BP Location: Left arm, Patient Position: Lying)   Pulse 50   Temp 98.1 °F (36.7 °C) (Oral)   Resp 14   Ht 157.5 cm (62\")   Wt 83.9 kg (185 lb)   SpO2 99%   BMI 33.84 kg/m²   GENERAL: well-nourished, well-developed, awake, alert, no acute distress, nontoxic appearing, comfortable  EYES: PERRL, sclerae anicteric, extraocular movements grossly intact, symmetric lids  EARS, NOSE, MOUTH, THROAT: atraumatic external nose and ears, moist mucous membranes  NECK: symmetric, trachea midline  RESPIRATORY: unlabored respiratory effort, clear to auscultation bilaterally, good air movement  CARDIOVASCULAR: no murmurs, peripheral pulses 2+ and equal in all extremities  GI: soft, nontender, nondistended  MUSCULOSKELETAL/EXTREMITIES: extremities without obvious deformity, no lower extremity edema or swelling  SKIN: warm and dry with no obvious rashes  NEUROLOGIC: moving all 4 extremities symmetrically, CN II-XII grossly intact  PSYCHIATRIC: alert, pleasant and cooperative. Appropriate mood and affect.      MEDICAL DECISION MAKING:    Sandy Estes is a 49 y.o. female who presented to the ED with palpitations, lightheadedness, dizziness, nausea with history of pulmonary valve placed in July " by her surgeon in [place] Dr. Anderson    Procedures    Differential Diagnosis Considered: Valve defect, arrhythmia, UTI, endocarditis, electrolyte abnormality, PE    Labs Ordered:  Labs Reviewed   COMPREHENSIVE METABOLIC PANEL - Abnormal; Notable for the following components:       Result Value    Creatinine 1.01 (*)     All other components within normal limits    Narrative:     GFR Categories in Chronic Kidney Disease (CKD)      GFR Category          GFR (mL/min/1.73)    Interpretation  G1                     90 or greater         Normal or high (1)  G2                      60-89                Mild decrease (1)  G3a                   45-59                Mild to moderate decrease  G3b                   30-44                Moderate to severe decrease  G4                    15-29                Severe decrease  G5                    14 or less           Kidney failure          (1)In the absence of evidence of kidney disease, neither GFR category G1 or G2 fulfill the criteria for CKD.    eGFR calculation 2021 CKD-EPI creatinine equation, which does not include race as a factor   URINALYSIS W/ MICROSCOPIC IF INDICATED (NO CULTURE) - Abnormal; Notable for the following components:    Color, UA Dark Yellow (*)     Glucose, UA >=1000 mg/dL (3+) (*)     All other components within normal limits    Narrative:     Urine microscopic not indicated.   CBC WITH AUTO DIFFERENTIAL - Abnormal; Notable for the following components:    MCHC 31.3 (*)     Lymphocyte % 14.8 (*)     Eosinophils, Absolute 0.43 (*)     All other components within normal limits   TROPONIN - Normal    Narrative:     High Sensitive Troponin T Reference Range:  <14.0 ng/L- Negative Female for AMI  <22.0 ng/L- Negative Male for AMI  >=14 - Abnormal Female indicating possible myocardial injury.  >=22 - Abnormal Male indicating possible myocardial injury.   Clinicians would have to utilize clinical acumen, EKG, Troponin, and serial changes to determine if it  is an Acute Myocardial Infarction or myocardial injury due to an underlying chronic condition.        MAGNESIUM - Normal   BLOOD CULTURE   BLOOD CULTURE   RAINBOW DRAW    Narrative:     The following orders were created for panel order [place] Draw.  Procedure                               Abnormality         Status                     ---------                               -----------         ------                     Green Top (Gel)[[id number]]                                  Final result               Lavender Top[[id number]]                                     Final result               Red Top[[id number]]                                          Final result               Light Blue Top[[id number]]                                   Final result                 Please view results for these tests on the individual orders.   HIGH SENSITIVITIY TROPONIN T 1HR   POCT GLUCOSE FINGERSTICK   CBC AND DIFFERENTIAL    Narrative:     The following orders were created for panel order CBC & Differential.  Procedure                               Abnormality         Status                     ---------                               -----------         ------                     CBC Auto Differential[[id number]]        Abnormal            Final result                 Please view results for these tests on the individual orders.   GREEN TOP   LAVENDER TOP   RED TOP   LIGHT BLUE TOP        Imaging Ordered:   XR Chest 1 View   Final Result   1. No acute appearing infiltrate or effusion.   2. Mild bronchial wall thickening, stable.   3. Heart size upper limits of normal. Prior median sternotomy.   Radiopaque wire/mesh like device overlying the upper cardiac silhouette.   The etiology for this is not clear. It could potentially represent some   type of left atrial appendage closure device. Correlate clinically. I   suppose this could be an artifact in clothing as well.               This report was signed and finalized on 2/14/2025  5:18 PM by Dr. Josh Garsia MD.              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 trivial PS and Mild to Mod. PI (age 8)    TONSILLECTOMY AND ADENOIDECTOMY         Allergies   Allergen Reactions    Penicillin G Shortness Of Breath     Other reaction(s): Unknown    Penicillins Shortness Of Breath    Viibryd [Vilazodone Hcl] Nausea And Vomiting and Other (See Comments)     fatigue    Amoxil [Amoxicillin] Hives    Biaxin [Clarithromycin] Hives    Cefzil [Cefprozil] Hives    Clindamycin/Lincomycin Hives    Doxycycline Hives    Lisinopril Other (See Comments)     Itchy throat, clearing throat a lot with this med    Sulfa Antibiotics Hives    Sulfasalazine Hives    Vancomycin Hives    Zofran [Ondansetron Hcl] Nausea And Vomiting         Current Facility-Administered Medications:     sodium chloride 0.9 % flush 10 mL, 10 mL, Intravenous, PRN, Emergency, Triage Protocol, MD    Current Outpatient Medications:     aspirin 81 MG EC tablet, Take 1 tablet by mouth Daily., Disp: , Rfl:     Budeson-Glycopyrrol-Formoterol (Breztri Aerosphere) 160-9-4.8 MCG/ACT aerosol inhaler, Inhale 2 puffs 2 (Two) Times a Day.,  Disp: , Rfl:     cyanocobalamin 1000 MCG/ML injection, Inject 1 mL every month by subcutaneous route., Disp: , Rfl:     Eliquis 5 MG tablet tablet, Take 1 tablet by mouth 2 (Two) Times a Day., Disp: , Rfl:     escitalopram (LEXAPRO) 20 MG tablet, Take 1 tablet by mouth Daily., Disp: , Rfl:     famotidine (PEPCID) 40 MG tablet, Take 1 tablet by mouth 2 (Two) Times a Day., Disp: , Rfl:     Farxiga 10 MG tablet, Take 10 mg by mouth Daily., Disp: , Rfl:     fenofibrate (TRICOR) 145 MG tablet, Take 1 tablet by mouth Daily., Disp: , Rfl:     ferrous sulfate 325 (65 FE) MG tablet, Take 1 tablet by mouth Daily With Breakfast., Disp: , Rfl:     fluticasone (FLONASE) 50 MCG/ACT nasal spray, 2 sprays by Each Nare route Daily., Disp: , Rfl:     fluticasone (Flovent HFA) 44 MCG/ACT inhaler, , Disp: , Rfl:     galcanezumab-gnlm (Emgality) 120 MG/ML auto-injector pen, Inject 1 mL under the skin into the appropriate area as directed Every 30 (Thirty) Days., Disp: 1 each, Rfl: 5    hydrOXYzine (ATARAX) 50 MG tablet, Take 1 tablet by mouth Every 4 (Four) Hours As Needed., Disp: , Rfl:     losartan (COZAAR) 100 MG tablet, Take 1 tablet by mouth Daily., Disp: , Rfl:     memantine (NAMENDA) 10 MG tablet, Take 1 tablet by mouth 2 (Two) Times a Day., Disp: 180 tablet, Rfl: 1    metoprolol succinate XL (TOPROL-XL) 25 MG 24 hr tablet, Take 1 tablet by mouth 2 (Two) Times a Day., Disp: , Rfl:     montelukast (SINGULAIR) 10 MG tablet, Take 1 tablet by mouth Every Night., Disp: , Rfl:     Rexulti 3 MG tablet, Take 1 tablet by mouth Daily., Disp: , Rfl:     rizatriptan MLT (Maxalt-MLT) 10 MG disintegrating tablet, Place 1 tablet on the tongue Daily As Needed for Migraine. May repeat in 2 hours if needed, Disp: 12 tablet, Rfl: 3    tamsulosin (FLOMAX) 0.4 MG capsule 24 hr capsule, Take 1 capsule by mouth Daily., Disp: , Rfl:     tiotropium bromide monohydrate (SPIRIVA RESPIMAT) 2.5 MCG/ACT aerosol solution inhaler, Inhale 2 puffs Daily., Disp: ,  Rfl:     Topiramate ER (Trokendi XR) 200 MG capsule sustained-release 24 hr, Take 1 capsule by mouth Daily., Disp: 90 capsule, Rfl: 1    traMADol (ULTRAM) 50 MG tablet, Take 1 tablet by mouth Every 6 (Six) Hours As Needed for Moderate Pain., Disp: , Rfl:     ubrogepant (Ubrelvy) 100 MG tablet, Take 1 tablet by mouth Daily As Needed (migraine)., Disp: 16 tablet, Rfl: 5    vitamin D (ERGOCALCIFEROL) 1.25 MG (08913 UT) capsule capsule, Take 1 capsule by mouth Every 7 (Seven) Days., Disp: , Rfl:     zolpidem (AMBIEN) 10 MG tablet, Take 1 tablet by mouth At Night As Needed for Sleep., Disp: , Rfl:     External documents reviewed: Reviewed CT surgery's documentation from new valve placed in July 2024.    My EKG interpretation: See below, unchanged previous EKG    My lab interpretation: Lab work without significant abnormalities.    My imaging interpretation: Chest x-ray without acute process and history of cardiothoracic surgery.    Discussed with: Patient and mother    ED Course and Re-evaluation: Discussed with Dr. Del Valle of in her palpitations and lightheadedness with known valve will admit for observation and a echo to evaluate her pulmonic valve.    ED Course as of 02/14/25 1911 Fri Feb 14, 2025   1655 EKG sinus bradycardia with normal intervals, no signs of ischemia or arrhythmia. [JJ]   1657 Unable to find PCM documentation from visit today. [JJ]   1659 Patient has had a hysterectomy do not believe she is pregnant. [JJ]   1835 Reviewed E[facility] from [facility] with sinus bradycardia, rate of 52, normal intervals besides slightly prolonged QT of 502.  No signs of ischemia or arrhythmia. [JJ]   1908 Do not suspect patient has a pulmonary embolism with no missed doses of her anticoagulation. [JJ]      ED Course User Index  [JJ] Freddy Watts MD        ED Critical Care time:     ED Diagnosis:  (R00.2) Palpitations     Disposition: Admission for observations and further evaluation of her palpitations with an  echocardiogram.        Signed:  Freddy Watts MD  Emergency Medicine Physician    Please note that portions of this note were completed with a voice recognition program.      Freddy Watts MD  02/14/25 1911

## 2025-02-16 ENCOUNTER — HOSPITAL ENCOUNTER (OUTPATIENT)
Dept: CARDIOLOGY | Facility: HOSPITAL | Age: 50
Setting detail: OBSERVATION
Discharge: HOME | End: 2025-02-16
Payer: COMMERCIAL

## 2025-02-16 VITALS
HEART RATE: 61 BPM | RESPIRATION RATE: 16 BRPM | HEIGHT: 62 IN | DIASTOLIC BLOOD PRESSURE: 62 MMHG | TEMPERATURE: 98 F | OXYGEN SATURATION: 96 % | SYSTOLIC BLOOD PRESSURE: 100 MMHG | WEIGHT: 187 LBS | BODY MASS INDEX: 34.41 KG/M2

## 2025-02-16 DIAGNOSIS — R00.1 BRADYCARDIA: ICD-10-CM

## 2025-02-16 LAB
ANION GAP SERPL CALCULATED.3IONS-SCNC: 11 MMOL/L (ref 5–15)
BASOPHILS # BLD AUTO: 0.04 10*3/MM3 (ref 0–0.2)
BASOPHILS NFR BLD AUTO: 0.5 % (ref 0–1.5)
BUN SERPL-MCNC: 23 MG/DL (ref 6–20)
BUN/CREAT SERPL: 21.1 (ref 7–25)
CALCIUM SPEC-SCNC: 8.8 MG/DL (ref 8.6–10.5)
CHLORIDE SERPL-SCNC: 105 MMOL/L (ref 98–107)
CO2 SERPL-SCNC: 22 MMOL/L (ref 22–29)
CREAT SERPL-MCNC: 1.09 MG/DL (ref 0.57–1)
DEPRECATED RDW RBC AUTO: 44.7 FL (ref 37–54)
EGFRCR SERPLBLD CKD-EPI 2021: 63.2 ML/MIN/1.73
EOSINOPHIL # BLD AUTO: 0.39 10*3/MM3 (ref 0–0.4)
EOSINOPHIL NFR BLD AUTO: 5.1 % (ref 0.3–6.2)
ERYTHROCYTE [DISTWIDTH] IN BLOOD BY AUTOMATED COUNT: 13 % (ref 12.3–15.4)
GLUCOSE SERPL-MCNC: 106 MG/DL (ref 65–99)
HCT VFR BLD AUTO: 36.2 % (ref 34–46.6)
HGB BLD-MCNC: 11.3 G/DL (ref 12–15.9)
IMM GRANULOCYTES # BLD AUTO: 0.02 10*3/MM3 (ref 0–0.05)
IMM GRANULOCYTES NFR BLD AUTO: 0.3 % (ref 0–0.5)
LYMPHOCYTES # BLD AUTO: 1.32 10*3/MM3 (ref 0.7–3.1)
LYMPHOCYTES NFR BLD AUTO: 17.2 % (ref 19.6–45.3)
MCH RBC QN AUTO: 29.7 PG (ref 26.6–33)
MCHC RBC AUTO-ENTMCNC: 31.2 G/DL (ref 31.5–35.7)
MCV RBC AUTO: 95 FL (ref 79–97)
MONOCYTES # BLD AUTO: 0.69 10*3/MM3 (ref 0.1–0.9)
MONOCYTES NFR BLD AUTO: 9 % (ref 5–12)
NEUTROPHILS NFR BLD AUTO: 5.2 10*3/MM3 (ref 1.7–7)
NEUTROPHILS NFR BLD AUTO: 67.9 % (ref 42.7–76)
NRBC BLD AUTO-RTO: 0 /100 WBC (ref 0–0.2)
PLATELET # BLD AUTO: 257 10*3/MM3 (ref 140–450)
PMV BLD AUTO: 11.4 FL (ref 6–12)
POTASSIUM SERPL-SCNC: 4 MMOL/L (ref 3.5–5.2)
RBC # BLD AUTO: 3.81 10*6/MM3 (ref 3.77–5.28)
SODIUM SERPL-SCNC: 138 MMOL/L (ref 136–145)
WBC NRBC COR # BLD AUTO: 7.66 10*3/MM3 (ref 3.4–10.8)

## 2025-02-16 PROCEDURE — 94799 UNLISTED PULMONARY SVC/PX: CPT

## 2025-02-16 PROCEDURE — 93246 EXT ECG>7D<15D RECORDING: CPT

## 2025-02-16 PROCEDURE — G0378 HOSPITAL OBSERVATION PER HR: HCPCS

## 2025-02-16 PROCEDURE — 80048 BASIC METABOLIC PNL TOTAL CA: CPT | Performed by: INTERNAL MEDICINE

## 2025-02-16 PROCEDURE — 85025 COMPLETE CBC W/AUTO DIFF WBC: CPT | Performed by: INTERNAL MEDICINE

## 2025-02-16 PROCEDURE — 94660 CPAP INITIATION&MGMT: CPT

## 2025-02-16 PROCEDURE — 94761 N-INVAS EAR/PLS OXIMETRY MLT: CPT

## 2025-02-16 RX ORDER — HYDROXYZINE HYDROCHLORIDE 50 MG/1
50 TABLET, FILM COATED ORAL 2 TIMES DAILY
Start: 2025-02-16

## 2025-02-16 RX ADMIN — APIXABAN 5 MG: 5 TABLET, FILM COATED ORAL at 09:30

## 2025-02-16 RX ADMIN — CLOPIDOGREL BISULFATE 75 MG: 75 TABLET ORAL at 09:30

## 2025-02-16 RX ADMIN — HYDROXYZINE HYDROCHLORIDE 50 MG: 25 TABLET ORAL at 09:30

## 2025-02-16 RX ADMIN — FERROUS SULFATE TAB 325 MG (65 MG ELEMENTAL FE) 325 MG: 325 (65 FE) TAB at 09:30

## 2025-02-16 RX ADMIN — IPRATROPIUM BROMIDE 0.5 MG: 0.5 SOLUTION RESPIRATORY (INHALATION) at 06:47

## 2025-02-16 RX ADMIN — EMPAGLIFLOZIN 25 MG: 25 TABLET, FILM COATED ORAL at 09:30

## 2025-02-16 RX ADMIN — TOPIRAMATE 100 MG: 100 TABLET, FILM COATED ORAL at 09:31

## 2025-02-16 RX ADMIN — FAMOTIDINE 40 MG: 20 TABLET, FILM COATED ORAL at 09:30

## 2025-02-16 RX ADMIN — BUDESONIDE AND FORMOTEROL FUMARATE DIHYDRATE 2 PUFF: 160; 4.5 AEROSOL RESPIRATORY (INHALATION) at 06:47

## 2025-02-16 RX ADMIN — FLUTICASONE PROPIONATE 2 SPRAY: 50 SPRAY, METERED NASAL at 09:31

## 2025-02-16 RX ADMIN — ESCITALOPRAM OXALATE 20 MG: 10 TABLET ORAL at 09:30

## 2025-02-16 RX ADMIN — MEMANTINE 10 MG: 5 TABLET ORAL at 09:30

## 2025-02-16 NOTE — PLAN OF CARE
Goal Outcome Evaluation:           Progress: improving  Outcome Evaluation: Patient has been up to chair and assisted to bathroom with supervision. Heart rate at this time is 63 and she has had no complaints of being lightheaded or dizzy.

## 2025-02-16 NOTE — DISCHARGE SUMMARY
[facility] Medicine Services  DISCHARGE SUMMARY       Date of Admission: 2/14/2025  Date of Discharge:  2/16/2025  Primary Care Physician: Kenneth Kolb MD    Presenting Problem/History of Present Illness:  Lightheadedness, headache and palpitations    Final Discharge Diagnoses:  Active Hospital Problems    Diagnosis     **Bradycardia     History of pulmonic valve replacement, [place] 7/29/24     GABRIELLE (obstructive sleep apnea)     Palpitations     Adult congenital heart disease     Hypertension     Anxiety        Consults: Cardiac electrophysiology-Dr. Tinsley    Procedures Performed: None    Pertinent Test Results:   Results for orders placed during the hospital encounter of 02/14/25    Adult Transthoracic Echo Complete W/ Cont if Necessary Per Protocol    Interpretation Summary    Left ventricular systolic function is normal. Left ventricular ejection fraction appears to be 66 - 70%.    Left ventricular wall thickness is consistent with severe concentric hypertrophy.    Left ventricular diastolic function is consistent with (grade II w/high LAP) pseudonormalization.    The right ventricular cavity is mild to moderately dilated with normal systolic function.    The left atrial cavity is mildly dilated.    Echolucent area posterior to the left atrial base that is difficult to discern as separate from the atrium in the current study although likely represents dilated coronary sinus that has previously been reported on CTA chest from 3/6/24 and echo from 7/30/24. Could consider CTA chest for further evaluation.    The right atrial cavity is mildly  dilated.    S/p transcatheter PVR with 25 [place] valve that appears well positioned with mild stenosis; peak gradient 22/13 mmHg overall similar to report of 19/12 mmHg from 7/30/24. There is no significant regurgitation.    Estimated right ventricular systolic pressure from tricuspid regurgitation is mildly elevated (35-45  mmHg).      Imaging Results (All)       Procedure Component Value Units Date/Time    XR Chest 1 View [[id number]] Collected: 02/14/25 1711     Updated: 02/14/25 1721    Narrative:      EXAMINATION:  XR CHEST 1 VW-  2/14/2025 3:47 PM     HISTORY: Weakness and dizziness. Altered mental status triage protocol.     COMPARISON: 5/31/2022.     TECHNIQUE: Single view AP image.     FINDINGS: Minimal hazy density at the right lung base medially is  present on the prior study and probably a fat pad. The lungs are clear.  Mild bronchial wall thickening is stable. Heart size is upper limits of  normal. Prior median sternotomy. There is a radiopaque wire/meshlike  device overlying the upper cardiac silhouette. No acute bony abnormality  is seen.          Impression:      1. No acute appearing infiltrate or effusion.  2. Mild bronchial wall thickening, stable.  3. Heart size upper limits of normal. Prior median sternotomy.  Radiopaque wire/mesh like device overlying the upper cardiac silhouette.  The etiology for this is not clear. It could potentially represent some  type of left atrial appendage closure device. Correlate clinically. I  suppose this could be an artifact in clothing as well.           This report was signed and finalized on 2/14/2025 5:18 PM by Dr. Josh Garsia MD.             LAB RESULTS:      Lab 02/16/25  0535 02/15/25  0340 02/14/25  1738   WBC 7.66 7.17 8.18   HEMOGLOBIN 11.3* 11.5* 12.3   HEMATOCRIT 36.2 37.3 39.3   PLATELETS 257 248 283   NEUTROS ABS 5.20 4.79 5.81   IMMATURE GRANS (ABS) 0.02 0.05 0.03   LYMPHS ABS 1.32 1.24 1.21   MONOS ABS 0.69 0.63 0.65   EOS ABS 0.39 0.41* 0.43*   MCV 95.0 95.4 94.2         Lab 02/16/25  0535 02/15/25  0340 02/14/25  1738   SODIUM 138 140 139   POTASSIUM 4.0 3.6 3.9   CHLORIDE 105 108* 106   CO2 22.0 20.0* 23.0   ANION GAP 11.0 12.0 10.0   BUN 23* 19 19   CREATININE 1.09* 0.85 1.01*   EGFR 63.2 85.2 69.2   GLUCOSE 106* 77 83   CALCIUM 8.8 8.5* 9.3   MAGNESIUM  --    --  2.2         Lab 02/14/25  1738   TOTAL PROTEIN 7.1   ALBUMIN 3.9   GLOBULIN 3.2   ALT (SGPT) 16   AST (SGOT) 21   BILIRUBIN 0.4   ALK PHOS 75         Lab 02/14/25 1924 02/14/25  1738   HSTROP T <6 <6                 Brief Urine Lab Results  (Last result in the past 365 days)        Color   Clarity   Blood   Leuk Est   Nitrite   Protein   CREAT   Urine HCG        02/14/25 1739 Dark Yellow   Clear   Negative   Negative   Negative   Negative                 Microbiology Results (last 10 days)       Procedure Component Value - Date/Time    Blood Culture - Blood, Wrist, Left [[id number]]  (Normal) Collected: 02/14/25 1924    Lab Status: Preliminary result Specimen: Blood from Wrist, Left Updated: 02/15/25 2000     Blood Culture No growth at 24 hours    Blood Culture - Blood, Arm, Right [[id number]]  (Normal) Collected: 02/14/25 1856    Lab Status: Preliminary result Specimen: Blood from Arm, Right Updated: 02/15/25 1931     Blood Culture No growth at 24 hours          Microbiology Results (last 10 days)       Procedure Component Value - Date/Time    Blood Culture - Blood, Wrist, Left [[id number]]  (Normal) Collected: 02/14/25 1924    Lab Status: Preliminary result Specimen: Blood from Wrist, Left Updated: 02/15/25 2000     Blood Culture No growth at 24 hours    Blood Culture - Blood, Arm, Right [[id number]]  (Normal) Collected: 02/14/25 1856    Lab Status: Preliminary result Specimen: Blood from Arm, Right Updated: 02/15/25 1931     Blood Culture No growth at 24 hours             Documented weights    02/14/25 1541 02/14/25 1958   Weight: 83.9 kg (185 lb) 84.8 kg (187 lb)        Hospital Course: Sandy Estes is a 49-year-old woman with a past medical history of congenital heart disease-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 She  presents with complaints of sudden onset of lightheadedness, palpitations and headache that started this morning around 4 AM.     Patient developed lightheadedness today around 4 AM the morning with associated palpitations, nausea and dizziness. Lightheadedness was present both on lying down and on getting up and ambulating. She denied chest pain or shortness of breath but had some headache.  She denies fevers or chills. She states she did not take any of her home medications today due to her symptoms. In the ER, she had an EKG that showed sinus bradycardia. A chest x-ray showed no acute abnormality.  CBC, BMP and troponin were unremarkable. D-dimer was negative.  proBNP was slightly elevated at 858. TSH was slightly elevated at 6.5 but free T4 was normal at 1.01. She was recommended for admission.  7/29/2024)  TPV 25 [place] valve          9/03/2024)  Echocardiogram     -The left ventricle is qualitatively normal in size, with mild left   ventricular hypertrophy and normal systolic function (LVEF ~65%)   -Right ventricle is qualitatively at least mildly enlarged in size (similar   to prior), with normal wall thickness and qualitatively normal systolic   function.   -The RVOT, [place] bioprosthetic pulmonic valve and pulmonary arteries aer   not well seen. Limited Color-flow and spectral Doppler assessment measures   only mild stenosis (pk/mn gradients 20/12 mmHg), but no significant   insufficiency.   -Trivial tricuspid valve regurgitation noted. The tricuspid valve   regurgitant peak velocity by Doppler is 3.09 m/s with a right ventricular   systolic pressure estimated at 38 mmHg plus RAp (Consistent with either mild   residual RVOT/pulmonary valve stenosis or mild to moderate pulmonary   hypertension).   -There is left atrial enlargement present, stable.   -There appears to be a dilated coronary sinus, likely secondary to a left   SVC (normal variant).   -No pericardial effusion, vegetations or thrombi seen.       Bradycardia/palpitations-continue cardiac telemetry, overnight cardiac telemetry revealed SB/S 51-63.  Cardiology consult discontinued per Dr. Galindo, initiated electrophysiology consult per , with recommendations for scheduling for pulmonary vein isolation as outpatient, continue metoprolol at current dose, 2-week Zio patch which has been placed prior to discharge, continue Eliquis and Plavix stop aspirin, will arrange outpatient follow-up in EP clinic.    History of pulmonic valve replacement/adult congenital heart disease-currently stable, most recent interventions in HPI.  Repeat echocardiogram revealed an LVEF of 66-70% with normal systolic function, left ventricular wall thickness consistent with severe concentric hypertrophy, diastolic function is consistent with grade 2 pseudonormalization, mild to moderately dilated normal systolic function, echolucent area posterior to the left atrial base which was difficult to discern, evaluated by Dr. Tinsley with recommendations poor outpatient evaluation.  S/p transcatheter PVR with 25 [place] valve appears well-positioned with mild stenosis.     GABRIELLE-patient admits noncompliance to CPAP use, patient has been tolerant to wearing CPAP during hospitalization, encouraged patient to discuss different mask options with her [employer] company and the importance of her compliance.    This morning patient is resting comfortably in bed on room air with no family at bedside.  Patient states that her dizziness is completely resolved, she feels more comfortable after her discussion with Dr. Tinsley.  She verbalized understanding to medication changes, all questions were answered to the best my ability and she is in agreement for discharge home today.    Patient has reached the maximum benefit of hospitalization and is stable for discharge  Patient has been evaluated today 02/16/25 and is stable for discharge.     Physical Exam on Discharge:  /62 (BP Location: Left arm, Patient  "Position: Lying)   Pulse 61   Temp 98 °F (36.7 °C) (Oral)   Resp 16   Ht 157.5 cm (62\")   Wt 84.8 kg (187 lb)   SpO2 96%   BMI 34.20 kg/m²   Physical Exam  Vitals and nursing note reviewed.   Constitutional:       General: She is not in acute distress.     Appearance: She is obese. She is not ill-appearing or toxic-appearing.   HENT:      Right Ear: Tympanic membrane normal.      Left Ear: Tympanic membrane normal.      Nose: Nose normal. No congestion or rhinorrhea.      Mouth/Throat:      Mouth: Mucous membranes are moist.      Pharynx: Oropharynx is clear.   Eyes:      Pupils: Pupils are equal, round, and reactive to light.   Cardiovascular:      Rate and Rhythm: Bradycardia present. Rhythm irregular.      Pulses: Normal pulses.      Heart sounds: Normal heart sounds.   Pulmonary:      Effort: Pulmonary effort is normal.      Breath sounds: Normal breath sounds.   Abdominal:      General: Abdomen is protuberant. Bowel sounds are normal. There is no distension.      Palpations: Abdomen is soft.      Tenderness: There is no abdominal tenderness.   Musculoskeletal:         General: Normal range of motion.      Cervical back: Normal range of motion and neck supple. No rigidity or tenderness.      Right lower leg: No edema.      Left lower leg: No edema.   Skin:     General: Skin is warm.      Capillary Refill: Capillary refill takes less than 2 seconds.      Coloration: Skin is pale.   Neurological:      General: No focal deficit present.      Mental Status: She is oriented to person, place, and time.   Psychiatric:         Mood and Affect: Mood normal.         Behavior: Behavior normal.         Thought Content: Thought content normal.         Judgment: Judgment normal.     Condition on Discharge: Stable for discharge home    Discharge Disposition:  Home or Self Care    Discharge Medications:     Discharge Medications        Continue These Medications        Instructions Start Date   albuterol sulfate  " (90 Base) MCG/ACT inhaler  Commonly known as: PROVENTIL HFA;VENTOLIN HFA;PROAIR HFA   2 puffs, Inhalation, Every 4 Hours PRN      clopidogrel 75 MG tablet  Commonly known as: PLAVIX   75 mg, Daily      cyanocobalamin 1000 MCG/ML injection   1,000 mcg, Subcutaneous, Every 30 Days      docusate sodium 100 MG capsule  Commonly known as: COLACE   100 mg, Oral, 3 Times Daily PRN      Eliquis 5 MG tablet tablet  Generic drug: apixaban   5 mg, Oral, 2 Times Daily      Emgality 120 MG/ML auto-injector pen  Generic drug: galcanezumab-gnlm   120 mg, Subcutaneous, Every 30 Days      escitalopram 20 MG tablet  Commonly known as: LEXAPRO   30 mg, Oral, Daily      famotidine 40 MG tablet  Commonly known as: PEPCID   40 mg, Oral, 2 Times Daily      Farxiga 10 MG tablet  Generic drug: dapagliflozin Propanediol   1 tablet, Oral, Daily      fenofibrate 145 MG tablet  Commonly known as: TRICOR   145 mg, Oral, Daily      ferrous sulfate 325 (65 FE) MG tablet   325 mg, Oral, Daily With Breakfast      hydrOXYzine 50 MG tablet  Commonly known as: ATARAX   50 mg, Oral, 2 Times Daily      hydrOXYzine 50 MG tablet  Commonly known as: ATARAX   50 mg, Oral, 2 Times Daily      memantine 10 MG tablet  Commonly known as: NAMENDA   10 mg, Oral, 2 Times Daily      metoprolol succinate XL 25 MG 24 hr tablet  Commonly known as: TOPROL-XL   1 tablet, Oral, 2 Times Daily      montelukast 10 MG tablet  Commonly known as: SINGULAIR   10 mg, Oral, Nightly      Rexulti 3 MG tablet  Generic drug: Brexpiprazole   1 tablet, Oral, Daily      rizatriptan MLT 10 MG disintegrating tablet  Commonly known as: Maxalt-MLT   10 mg, Translingual, Daily PRN, May repeat in 2 hours if needed      Topiramate  MG capsule sustained-release 24 hr  Commonly known as: Trokendi XR   1 capsule, Oral, Daily      traMADol 50 MG tablet  Commonly known as: ULTRAM   50 mg, Oral, Every 12 Hours PRN      ubrogepant 100 MG tablet  Commonly known as: Ubrelvy   100 mg, Oral, Daily  PRN      vitamin D 1.25 MG (54967 UT) capsule capsule  Commonly known as: ERGOCALCIFEROL   50,000 Units, Oral, Every 7 Days, Sundays      zolpidem 10 MG tablet  Commonly known as: AMBIEN   10 mg, Oral, Nightly PRN             Stop These Medications      aspirin 81 MG chewable tablet     fluticasone 44 MCG/ACT inhaler  Commonly known as: FLOVENT HFA              Discharge Diet:   Diet Instructions       Diet: Cardiac Diets; Healthy Heart (2-3 Na+); Thin (IDDSI 0)      Discharge Diet: Cardiac Diets    Cardiac Diet: Healthy Heart (2-3 Na+)    Fluid Consistency: Thin (IDDSI 0)            Activity at Discharge:   Activity Instructions       Activity as Tolerated              Discharge Instructions:   1.  Patient was instructed return for medical attention for any new or worsening shortness of breath, chest pain or palpitations.  2.  Patient was instructed to follow-up with PCP in 1 week.  3.  Patient was instructed to follow-up with Dr. Tinsley, office will call with date and time of appointment.  4.  Patient was instructed cardiology technician on the placement, management and return of Zio patch cardiac monitor.    Follow-up Appointments:   Future Appointments   Date Time Provider Department Center   3/26/2025  9:45 AM Amber Tinsley MD [facility] CD PAD PAD   1/5/2026 11:00 AM Ranjeet Mathias PA [facility] N PAD PAD       Test Results Pending at Discharge: 14-day Zio patch    Electronically signed by JOSE Caba, 02/16/25, 10:15 CST.    Time: 5 minutes minutes.

## 2025-02-16 NOTE — PLAN OF CARE
Goal Outcome Evaluation:  Plan of Care Reviewed With: patient        Progress: improving  Outcome Evaluation: VSS.  Pt has ambulated to restroom without difficulty or dizziness.  No c/o pain.  Telemetry reading SB-SR 56-66 bpm.  Safety maintained.

## 2025-02-16 NOTE — PLAN OF CARE
Goal Outcome Evaluation:            PT aox4. Zio patch placed. Education provided. Waiting on transportation home. Safety maintained.

## 2025-02-16 NOTE — PROGRESS NOTES
"EP Problems:  1.  Paroxysmal atrial fibrillation   2.  Sinus bradycardia, drug-induced  3.  Occasional PVCs  4.  Persistent left-sided SVC    Cardiology Problems:  1.  Congenital heart disease  2.  Congenital pulmonic valve stenosis   - ~1979: Surgical pulmonic valve repair   -7/2024: Transcatheter PVR  3.  Hypertension    Medical Problems:  1.  GERD  2.  Migraine disorder  3.  Depression  4.  Optic chiasm glioma  5.  Asthma  6.  Anxiety disorder  7.  Neurofibromatosis  8.  Obesity    Patient ID:  Sandy Estes is a 49 y.o. female with problem list as above as above who EP is following for paroxysmal atrial fibrillation, drug-induced bradycardia.    Subjective:  No significant events overnight.    Objective:  /62 (BP Location: Left arm, Patient Position: Lying)   Pulse 61   Temp 98 °F (36.7 °C) (Oral)   Resp 16   Ht 157.5 cm (62\")   Wt 84.8 kg (187 lb)   SpO2 96%   BMI 34.20 kg/m²     Chronically ill-appearing  Regular rhythm, bradycardic  Apical murmur present  Clear to auscultation bilaterally  Warm, well-perfused    Labs today:  Lab Results   Component Value Date    GLUCOSE 106 (H) 02/16/2025    CALCIUM 8.8 02/16/2025     02/16/2025    K 4.0 02/16/2025    CO2 22.0 02/16/2025    BUN 23 (H) 02/16/2025    CREATININE 1.09 (H) 02/16/2025    EGFR 63.2 02/16/2025     Lab Results   Component Value Date    WBC 7.66 02/16/2025    HGB 11.3 (L) 02/16/2025    HCT 36.2 02/16/2025     02/16/2025     Echo from yesterday results reviewed: Normal pulmonary valve gradients, severe LVH, normal EF, mild left atrial dilatation, dilated coronary sinus    Telemetry overnight reviewed: No significant sustained arrhythmias.  Remains sinus bradycardic with heart rates in the 150s overnight.    Assessment:  Paroxysmal atrial fibrillation  Drug-induced sinus bradycardia  Lightheadedness  Severe LVH    Plan:  -Continue Plavix and apixaban for anticoagulation  -Continue metoprolol at current dose  -2-week Holter " monitor at discharge  -Blood pressure appears to be well-controlled  -Okay for discharge home from EP standpoint with plans for outpatient ablation     Part of this note may be an electronic transcription/translation of spoken language to printed text using the Dragon Dictation System.

## 2025-02-18 PROBLEM — I48.0 PAROXYSMAL ATRIAL FIBRILLATION: Status: ACTIVE | Noted: 2025-02-15

## 2025-02-19 LAB
BACTERIA SPEC AEROBE CULT: NORMAL
BACTERIA SPEC AEROBE CULT: NORMAL

## 2025-03-07 LAB
CV ZIO BASELINE AVG BPM: 62 BPM
CV ZIO BASELINE BPM HIGH: 197 BPM
CV ZIO BASELINE BPM LOW: 45 BPM
CV ZIO DEVICE ANALYSIS TIME: NORMAL
CV ZIO ECT SVE COUNT: 2555 EPISODES
CV ZIO ECT SVE CPLT COUNT: 112 EPISODES
CV ZIO ECT SVE CPLT FREQ: NORMAL
CV ZIO ECT SVE FREQ: NORMAL
CV ZIO ECT SVE TPLT COUNT: 43 EPISODES
CV ZIO ECT SVE TPLT FREQ: NORMAL
CV ZIO ECT VE COUNT: 426 EPISODES
CV ZIO ECT VE CPLT COUNT: 0 EPISODES
CV ZIO ECT VE CPLT FREQ: 0
CV ZIO ECT VE FREQ: NORMAL
CV ZIO ECT VE TPLT COUNT: 0 EPISODES
CV ZIO ECT VE TPLT FREQ: 0
CV ZIO ECTOPIC SVE COUPLET RAW PERCENT: 0.02 %
CV ZIO ECTOPIC SVE ISOLATED PERCENT: 0.25 %
CV ZIO ECTOPIC SVE TRIPLET RAW PERCENT: 0.01 %
CV ZIO ECTOPIC VE COUPLET RAW PERCENT: 0 %
CV ZIO ECTOPIC VE ISOLATED PERCENT: 0.04 %
CV ZIO ECTOPIC VE TRIPLET RAW PERCENT: 0 %
CV ZIO ENROLLMENT END: NORMAL
CV ZIO ENROLLMENT START: NORMAL
CV ZIO PATIENT EVENTS DIARIES: 5
CV ZIO PATIENT EVENTS TRIGGERS: 11
CV ZIO PAUSE COUNT: 0
CV ZIO PRESCRIPTION STATUS: NORMAL
CV ZIO SVT AVG BPM: 126 BPM
CV ZIO SVT BPM HIGH: 197 BPM
CV ZIO SVT BPM LOW: 58 BPM
CV ZIO SVT COUNT: 111
CV ZIO SVT F EPI AVG BPM: 177 BPM
CV ZIO SVT F EPI BEATS: 12 BEATS
CV ZIO SVT F EPI BPM HIGH: 197 BPM
CV ZIO SVT F EPI BPM LOW: 162 BPM
CV ZIO SVT F EPI DUR: 4.1 SEC
CV ZIO SVT F EPI END: NORMAL
CV ZIO SVT F EPI START: NORMAL
CV ZIO SVT L EPI AVG BPM: 134 BPM
CV ZIO SVT L EPI BEATS: 23 BEATS
CV ZIO SVT L EPI BPM HIGH: 171 BPM
CV ZIO SVT L EPI BPM LOW: 84 BPM
CV ZIO SVT L EPI DUR: 10.8 SEC
CV ZIO SVT L EPI END: NORMAL
CV ZIO SVT L EPI START: NORMAL
CV ZIO SVT SYMPT IN PT: NORMAL
CV ZIO TOTAL  ENROLLMENT PERIOD: NORMAL
CV ZIO VT COUNT: 0

## 2025-03-12 ENCOUNTER — TRANSCRIBE ORDERS (OUTPATIENT)
Dept: ADMINISTRATIVE | Age: 50
End: 2025-03-12

## 2025-03-12 DIAGNOSIS — M85.80 OTHER SPECIFIED DISORDERS OF BONE DENSITY AND STRUCTURE, UNSPECIFIED SITE: Primary | ICD-10-CM

## 2025-03-19 ENCOUNTER — HOSPITAL ENCOUNTER (OUTPATIENT)
Dept: ULTRASOUND IMAGING | Age: 50
Discharge: HOME | End: 2025-03-19
Payer: MEDICAID

## 2025-03-19 DIAGNOSIS — M85.80 OTHER SPECIFIED DISORDERS OF BONE DENSITY AND STRUCTURE, UNSPECIFIED SITE: ICD-10-CM

## 2025-03-19 PROCEDURE — 77080 DXA BONE DENSITY AXIAL: CPT

## 2025-03-26 ENCOUNTER — OFFICE VISIT (OUTPATIENT)
Dept: CARDIOLOGY | Facility: CLINIC | Age: 50
End: 2025-03-26
Payer: COMMERCIAL

## 2025-03-26 VITALS
SYSTOLIC BLOOD PRESSURE: 120 MMHG | DIASTOLIC BLOOD PRESSURE: 78 MMHG | HEIGHT: 62 IN | WEIGHT: 188 LBS | HEART RATE: 60 BPM | BODY MASS INDEX: 34.6 KG/M2

## 2025-03-26 DIAGNOSIS — R00.1 BRADYCARDIA, DRUG INDUCED: ICD-10-CM

## 2025-03-26 DIAGNOSIS — I48.0 PAROXYSMAL ATRIAL FIBRILLATION: Primary | ICD-10-CM

## 2025-03-26 DIAGNOSIS — T50.905A BRADYCARDIA, DRUG INDUCED: ICD-10-CM

## 2025-03-26 NOTE — PROGRESS NOTES
"EP NEW PATIENT VISIT    Chief Complaint  Atrial Fibrillation    Subjective        History of Present Illness      EP Problems:  1.  Paroxysmal atrial fibrillation   2.  Sinus bradycardia, drug-induced  3.  Occasional PVCs  4.  Persistent left-sided SVC     Cardiology Problems:  1.  Congenital heart disease  2.  Congenital pulmonic valve stenosis   - ~1979: Surgical pulmonic valve repair   -7/2024: Transcatheter PVR  3.  Hypertension  4.  TIA     Medical Problems:  1.  GERD  2.  Migraine disorder  3.  Depression  4.  Optic chiasm glioma  5.  Asthma  6.  Anxiety disorder  7.  Neurofibromatosis  8.  Obesity       History of Present Illness  The patient is a 49-year-old woman who presents to the clinic for follow-up of atrial fibrillation.    She reports a general sense of well-being, with no recent episodes of lightheadedness or dizziness. Her primary care physician has adjusted her metoprolol dosage to half a tablet daily. She is currently on Eliquis and Plavix. She has no history of stroke or cardiac blockages but has experienced a transient ischemic attack (TIA) in the past. She expresses a desire to avoid lifelong anticoagulant therapy. She has a history of optic glioma and is not diabetic.    FAMILY HISTORY  Her father has had ablations in the past.    MEDICATIONS  Current: Metoprolol, Eliquis, Plavix      Objective   Vital Signs:  /78   Pulse 60   Ht 157.5 cm (62\")   Wt 85.3 kg (188 lb)   BMI 34.39 kg/m²   Estimated body mass index is 34.39 kg/m² as calculated from the following:    Height as of this encounter: 157.5 cm (62\").    Weight as of this encounter: 85.3 kg (188 lb).      Physical Exam     Physical Exam  The patient is awake and alert.  The patient is breathing comfortably with normal work of breathing and clear to auscultation bilaterally.  The heart rhythm is regular, within normal rate. There is a systolic murmur present.  The legs are warm and well perfused.  There are stigmata of " neurofibromatosis present. The patient is obese.    Result Review :  The following data was reviewed by: Amber Tinsley MD on 03/26/2025:    3/7/2025 Holter monitor directly visualized independently reviewed: Predominantly sinus rhythm, average heart rate 62, frequent runs of paroxysmal SVT nonsustained up to 10.8 seconds in duration    [id number] SCORE   [id number] Score: 4 (3/26/2025 10:46 AM)        ECG 12 Lead    Date/Time: 3/26/2025 10:48 AM  Performed by: Amber Tinsley MD    Authorized by: Amber Tinsley MD  Comparison: compared with previous ECG from 2/14/2025  Similar to previous ECG  Rhythm: sinus rhythm  Rate: normal  Conduction: conduction normal  QRS axis: normal  Other findings: low voltage, poor R wave progression and prolonged QTc interval    Clinical impression: abnormal EKG              Assessment and Plan   Diagnoses and all orders for this visit:    1. Paroxysmal atrial fibrillation (Primary)    2. Bradycardia, drug induced    Other orders  -     ECG 12 Lead      We discussed the risks and benefits of atrial fibrillation treatment again today and she would like to proceed as scheduled.  In addition, she is concerned about her long-term anticoagulation risk given her history of neurofibromatosis with intracranial tumors (optic glioma) and concerned about long-term bleeding complications.  Neurofibromatosis does have a known higher risk of intracranial hemorrhage and as such I do agree with her that there is reasonable cause for concern with long-term oral anticoagulant use.  As such, we discussed the risks and benefits of watchman implant at the same time as the ablation and she would like to proceed.    Plan:  -Watchman evaluation  -Schedule for combined watchman and ablation if testing is appropriate  -Continue anticoagulation for now with apixaban  -Agree with low-dose metoprolol given some breakthrough episodes of SVT           Follow Up   Return in about 3 months (around 6/26/2025).  Patient  was given instructions and counseling regarding her condition or for health maintenance advice. Please see specific information pulled into the AVS if appropriate.     Part of this note may be an electronic transcription/translation of spoken language to printed text using the Dragon Dictation System.    Patient or patient representative verbalized consent for the use of Ambient Listening during the visit with  Abmer Tinsley MD for chart documentation. 3/26/2025  10:49 CDT

## 2025-04-18 ENCOUNTER — TRANSCRIBE ORDERS (OUTPATIENT)
Dept: ADMINISTRATIVE | Facility: HOSPITAL | Age: 50
End: 2025-04-18
Payer: COMMERCIAL

## 2025-04-18 DIAGNOSIS — K86.89 OTHER SPECIFIED DISEASES OF PANCREAS: Primary | ICD-10-CM

## 2025-04-21 ENCOUNTER — TELEPHONE (OUTPATIENT)
Dept: CARDIOLOGY | Facility: CLINIC | Age: 50
End: 2025-04-21
Payer: COMMERCIAL

## 2025-04-21 ENCOUNTER — NURSE TRIAGE (OUTPATIENT)
Dept: CALL CENTER | Facility: HOSPITAL | Age: 50
End: 2025-04-21
Payer: COMMERCIAL

## 2025-04-21 NOTE — TELEPHONE ENCOUNTER
Patient called to report HR in the 50's. Noted on LOV EP aware of bradycardia.  Patient has no other complaints. She questions watchman/ablation procecure that is scheduled 5/8/25.

## 2025-04-21 NOTE — TELEPHONE ENCOUNTER
"Caller advises that she has been checking her heart rate using pulse ox machine and rate has been in low 50's. Also advises that she is having dizziness with standing and ambulation and some blurred vision. Advised caller to report to ED for evaluation. Verbalizes understanding.         Reason for Disposition   Dizziness, lightheadedness, or weakness    Additional Information   Negative: Passed out (i.e., lost consciousness, collapsed and was not responding)   Negative: Shock suspected (e.g., cold/pale/clammy skin, too weak to stand, low BP, rapid pulse)   Negative: Difficult to awaken or acting confused (e.g., disoriented, slurred speech)   Negative: Visible sweat on face or sweat dripping down face   Negative: Unable to walk, or can only walk with assistance (e.g., requires support)   Negative: [1] Received SHOCK from implantable cardiac defibrillator AND [2] persisting symptoms (i.e., palpitations, lightheadedness)   Negative: [1] Dizziness, lightheadedness, or weakness AND [2] heart beating very rapidly (e.g., > 140 / minute)   Negative: [1] Dizziness, lightheadedness, or weakness AND [2] heart beating very slowly (e.g., < 50 / minute)   Negative: Sounds like a life-threatening emergency to the triager   Negative: Chest pain   Negative: Implantable Cardiac Defibrillator (ICD) or a pacemaker symptoms or questions   Negative: Difficulty breathing    Answer Assessment - Initial Assessment Questions  1. DESCRIPTION: \"Please describe your heart rate or heartbeat that you are having\" (e.g., fast/slow, regular/irregular, skipped or extra beats, \"palpitations\")      Slow  2. ONSET: \"When did it start?\" (Minutes, hours or days)       This evening  3. DURATION: \"How long does it last\" (e.g., seconds, minutes, hours)      Continuously   4. PATTERN \"Does it come and go, or has it been constant since it started?\"  \"Does it get worse with exertion?\"   \"Are you feeling it now?\"      Normal but slow  5. TAP: \"Using your hand, " "can you tap out what you are feeling on a chair or table in front of you, so that I can hear?\" (Note: not all patients can do this)        Na  6. HEART RATE: \"Can you tell me your heart rate?\" \"How many beats in 15 seconds?\"  (Note: not all patients can do this)        Low 50's  7. RECURRENT SYMPTOM: \"Have you ever had this before?\" If Yes, ask: \"When was the last time?\" and \"What happened that time?\"       Yes, had medication decreased  8. CAUSE: \"What do you think is causing the palpitations?\"      Na  9. CARDIAC HISTORY: \"Do you have any history of heart disease?\" (e.g., heart attack, angina, bypass surgery, angioplasty, arrhythmia)       A-fib  10. OTHER SYMPTOMS: \"Do you have any other symptoms?\" (e.g., dizziness, chest pain, sweating, difficulty breathing)        Blurry vision and dizziness.   11. PREGNANCY: \"Is there any chance you are pregnant?\" \"When was your last menstrual period?\"        Na    Protocols used: Heart Rate and Heartbeat Questions-ADULT-AH    "

## 2025-04-22 DIAGNOSIS — I48.0 PAF (PAROXYSMAL ATRIAL FIBRILLATION): Primary | ICD-10-CM

## 2025-04-29 DIAGNOSIS — G43.109 MIGRAINE WITH AURA AND WITHOUT STATUS MIGRAINOSUS, NOT INTRACTABLE: ICD-10-CM

## 2025-04-30 RX ORDER — MEMANTINE HYDROCHLORIDE 10 MG/1
10 TABLET ORAL 2 TIMES DAILY
Qty: 180 TABLET | Refills: 0 | Status: SHIPPED | OUTPATIENT
Start: 2025-04-30

## 2025-05-03 ENCOUNTER — APPOINTMENT (OUTPATIENT)
Dept: CT IMAGING | Facility: HOSPITAL | Age: 50
End: 2025-05-03
Payer: COMMERCIAL

## 2025-05-03 ENCOUNTER — HOSPITAL ENCOUNTER (EMERGENCY)
Facility: HOSPITAL | Age: 50
Discharge: HOME | End: 2025-05-03
Attending: FAMILY MEDICINE
Payer: COMMERCIAL

## 2025-05-03 VITALS
OXYGEN SATURATION: 95 % | RESPIRATION RATE: 17 BRPM | TEMPERATURE: 98.2 F | HEART RATE: 58 BPM | DIASTOLIC BLOOD PRESSURE: 58 MMHG | SYSTOLIC BLOOD PRESSURE: 130 MMHG | WEIGHT: 185 LBS | BODY MASS INDEX: 34.04 KG/M2 | HEIGHT: 62 IN

## 2025-05-03 DIAGNOSIS — R10.9 LEFT SIDED ABDOMINAL PAIN: Primary | ICD-10-CM

## 2025-05-03 LAB
ALBUMIN SERPL-MCNC: 3.9 G/DL (ref 3.5–5.2)
ALBUMIN/GLOB SERPL: 1.3 G/DL
ALP SERPL-CCNC: 87 U/L (ref 39–117)
ALT SERPL W P-5'-P-CCNC: 15 U/L (ref 1–33)
ANION GAP SERPL CALCULATED.3IONS-SCNC: 10 MMOL/L (ref 5–15)
APTT PPP: 40.5 SECONDS (ref 24.5–36)
AST SERPL-CCNC: 15 U/L (ref 1–32)
BASOPHILS # BLD AUTO: 0.05 10*3/MM3 (ref 0–0.2)
BASOPHILS NFR BLD AUTO: 0.7 % (ref 0–1.5)
BILIRUB SERPL-MCNC: 0.5 MG/DL (ref 0–1.2)
BUN SERPL-MCNC: 14 MG/DL (ref 6–20)
BUN/CREAT SERPL: 15.9 (ref 7–25)
CALCIUM SPEC-SCNC: 9 MG/DL (ref 8.6–10.5)
CHLORIDE SERPL-SCNC: 108 MMOL/L (ref 98–107)
CO2 SERPL-SCNC: 23 MMOL/L (ref 22–29)
CREAT SERPL-MCNC: 0.88 MG/DL (ref 0.57–1)
D-LACTATE SERPL-SCNC: 1.2 MMOL/L (ref 0.5–2)
DEPRECATED RDW RBC AUTO: 46.2 FL (ref 37–54)
EGFRCR SERPLBLD CKD-EPI 2021: 81.2 ML/MIN/1.73
EOSINOPHIL # BLD AUTO: 0.46 10*3/MM3 (ref 0–0.4)
EOSINOPHIL NFR BLD AUTO: 6.8 % (ref 0.3–6.2)
ERYTHROCYTE [DISTWIDTH] IN BLOOD BY AUTOMATED COUNT: 13.4 % (ref 12.3–15.4)
GLOBULIN UR ELPH-MCNC: 3.1 GM/DL
GLUCOSE SERPL-MCNC: 90 MG/DL (ref 65–99)
HCT VFR BLD AUTO: 40.7 % (ref 34–46.6)
HGB BLD-MCNC: 12.9 G/DL (ref 12–15.9)
IMM GRANULOCYTES # BLD AUTO: 0.04 10*3/MM3 (ref 0–0.05)
IMM GRANULOCYTES NFR BLD AUTO: 0.6 % (ref 0–0.5)
INR PPP: 1.32 (ref 0.91–1.09)
LIPASE SERPL-CCNC: 66 U/L (ref 13–60)
LYMPHOCYTES # BLD AUTO: 1.05 10*3/MM3 (ref 0.7–3.1)
LYMPHOCYTES NFR BLD AUTO: 15.5 % (ref 19.6–45.3)
MAGNESIUM SERPL-MCNC: 2.1 MG/DL (ref 1.6–2.6)
MCH RBC QN AUTO: 29.2 PG (ref 26.6–33)
MCHC RBC AUTO-ENTMCNC: 31.7 G/DL (ref 31.5–35.7)
MCV RBC AUTO: 92.1 FL (ref 79–97)
MONOCYTES # BLD AUTO: 0.57 10*3/MM3 (ref 0.1–0.9)
MONOCYTES NFR BLD AUTO: 8.4 % (ref 5–12)
NEUTROPHILS NFR BLD AUTO: 4.61 10*3/MM3 (ref 1.7–7)
NEUTROPHILS NFR BLD AUTO: 68 % (ref 42.7–76)
NRBC BLD AUTO-RTO: 0 /100 WBC (ref 0–0.2)
PLATELET # BLD AUTO: 234 10*3/MM3 (ref 140–450)
PMV BLD AUTO: 11.4 FL (ref 6–12)
POTASSIUM SERPL-SCNC: 3.8 MMOL/L (ref 3.5–5.2)
PROT SERPL-MCNC: 7 G/DL (ref 6–8.5)
PROTHROMBIN TIME: 17.1 SECONDS (ref 11.8–14.8)
RBC # BLD AUTO: 4.42 10*6/MM3 (ref 3.77–5.28)
SODIUM SERPL-SCNC: 141 MMOL/L (ref 136–145)
WBC NRBC COR # BLD AUTO: 6.78 10*3/MM3 (ref 3.4–10.8)

## 2025-05-03 PROCEDURE — 25010000002 MORPHINE PER 10 MG: Performed by: FAMILY MEDICINE

## 2025-05-03 PROCEDURE — 83735 ASSAY OF MAGNESIUM: CPT | Performed by: FAMILY MEDICINE

## 2025-05-03 PROCEDURE — 96374 THER/PROPH/DIAG INJ IV PUSH: CPT

## 2025-05-03 PROCEDURE — 85610 PROTHROMBIN TIME: CPT | Performed by: FAMILY MEDICINE

## 2025-05-03 PROCEDURE — 25810000003 SODIUM CHLORIDE 0.9 % SOLUTION: Performed by: FAMILY MEDICINE

## 2025-05-03 PROCEDURE — 99285 EMERGENCY DEPT VISIT HI MDM: CPT | Performed by: FAMILY MEDICINE

## 2025-05-03 PROCEDURE — 25510000001 IOPAMIDOL 61 % SOLUTION: Performed by: FAMILY MEDICINE

## 2025-05-03 PROCEDURE — 83605 ASSAY OF LACTIC ACID: CPT | Performed by: FAMILY MEDICINE

## 2025-05-03 PROCEDURE — 83690 ASSAY OF LIPASE: CPT | Performed by: FAMILY MEDICINE

## 2025-05-03 PROCEDURE — 80053 COMPREHEN METABOLIC PANEL: CPT | Performed by: FAMILY MEDICINE

## 2025-05-03 PROCEDURE — 74177 CT ABD & PELVIS W/CONTRAST: CPT

## 2025-05-03 PROCEDURE — 85025 COMPLETE CBC W/AUTO DIFF WBC: CPT | Performed by: FAMILY MEDICINE

## 2025-05-03 PROCEDURE — 85730 THROMBOPLASTIN TIME PARTIAL: CPT | Performed by: FAMILY MEDICINE

## 2025-05-03 RX ORDER — ONDANSETRON 2 MG/ML
4 INJECTION INTRAMUSCULAR; INTRAVENOUS ONCE
Status: DISCONTINUED | OUTPATIENT
Start: 2025-05-03 | End: 2025-05-03 | Stop reason: HOSPADM

## 2025-05-03 RX ORDER — IOPAMIDOL 612 MG/ML
100 INJECTION, SOLUTION INTRAVASCULAR
Status: COMPLETED | OUTPATIENT
Start: 2025-05-03 | End: 2025-05-03

## 2025-05-03 RX ORDER — SODIUM CHLORIDE 0.9 % (FLUSH) 0.9 %
10 SYRINGE (ML) INJECTION AS NEEDED
Status: DISCONTINUED | OUTPATIENT
Start: 2025-05-03 | End: 2025-05-03 | Stop reason: HOSPADM

## 2025-05-03 RX ADMIN — IOPAMIDOL 100 ML: 612 INJECTION, SOLUTION INTRAVENOUS at 13:40

## 2025-05-03 RX ADMIN — SODIUM CHLORIDE 1000 ML: 9 INJECTION, SOLUTION INTRAVENOUS at 13:09

## 2025-05-03 RX ADMIN — MORPHINE SULFATE 4 MG: 4 INJECTION, SOLUTION INTRAMUSCULAR; INTRAVENOUS at 13:11

## 2025-05-03 NOTE — ED PROVIDER NOTES
Subjective   History of Present Illness  Patient presents emergency room with left-sided abdominal pain and nausea.  It has been going to the back.  Is been going on for the last few days.  She did have an abdominal ultrasound with some concerning findings on the pancreas and MRI scheduled for her in the next week and a half.  Patient denies any fevers.  No vomiting.  No diarrhea.  No bloody stools.  No chest pain or shortness of breath.  Patient denies any other symptoms at this time.          Review of Systems   Gastrointestinal:  Positive for abdominal pain and nausea.   All other systems reviewed and are negative.      Past Medical History:   Diagnosis Date    Anxiety     Asthma     Brain tumor, glioma     Optic chiasm    CHF (congestive heart failure)     Depression     GERD (gastroesophageal reflux disease)     Headache     Hyperlipidemia     Hypertension     Migraine     Neurofibromatosis     6 months old dx    GABRIELLE (obstructive sleep apnea) 2/15/2025    Paroxysmal atrial fibrillation 2/15/2025       Allergies   Allergen Reactions    Penicillin G Shortness Of Breath     Other reaction(s): Unknown    Penicillins Shortness Of Breath    Codeine Dizziness and Nausea And Vomiting    Pickled Meat Diarrhea    Viibryd [Vilazodone Hcl] Nausea And Vomiting and Other (See Comments)     fatigue    Vilazodone Nausea And Vomiting    Amoxil [Amoxicillin] Hives    Biaxin [Clarithromycin] Hives    Cefzil [Cefprozil] Hives    Clindamycin/Lincomycin Hives    Doxycycline Hives    Lisinopril Other (See Comments)     Itchy throat, clearing throat a lot with this med    Sulfa Antibiotics Hives    Sulfasalazine Hives    Vancomycin Hives    Zofran [Ondansetron Hcl] Nausea And Vomiting       Past Surgical History:   Procedure Laterality Date    CARDIAC SURGERY      COLONOSCOPY      ENDOSCOPIC FUNCTIONAL SINUS SURGERY (FESS) Bilateral 10/09/2020    Procedure: ENDOSCOPIC FUNCTIONAL SINUS SURGERY W TRACKING, BILATERAL MAXILLARY AND LEFT  SPHENOID SINUPLASTY,  RESECT INFERIOR TURBINATES VIA COBLATION;  Surgeon: Bairon Ramirez MD;  Location: [facility] OR;  Service: ENT;  Laterality: Bilateral;    ENDOSCOPY      GALLBLADDER SURGERY      HYSTERECTOMY      OTHER SURGICAL HISTORY      repaired infundibular & valvular PS: res. trivial PS and Mild to Mod. PI (age 8)    PULMONARY VALVE SURGERY  2024    DR. HDEZ    TONSILLECTOMY AND ADENOIDECTOMY         Family History   Problem Relation Age of Onset    Breast cancer Mother     Neurofibromatosis Mother     Neuropathy Mother     Heart disease Father     Hypertension Father     Diabetes Father     Migraines Father     Neurofibromatosis Maternal Grandfather     Stroke Maternal Grandfather     Dementia Maternal Grandmother        Social History     Socioeconomic History    Marital status: Single   Tobacco Use    Smoking status: Former     Current packs/day: 0.00     Average packs/day: 0.5 packs/day for 10.0 years (5.0 ttl pk-yrs)     Types: Cigarettes     Start date: 2003     Quit date: 2013     Years since quittin.3    Smokeless tobacco: Never   Vaping Use    Vaping status: Former   Substance and Sexual Activity    Alcohol use: No    Drug use: Never    Sexual activity: Defer           Objective   Physical Exam  Vitals and nursing note reviewed.   Constitutional:       Appearance: She is well-developed.   HENT:      Head: Normocephalic and atraumatic.      Mouth/Throat:      Mouth: Mucous membranes are moist.   Eyes:      Extraocular Movements: Extraocular movements intact.      Pupils: Pupils are equal, round, and reactive to light.   Cardiovascular:      Rate and Rhythm: Normal rate and regular rhythm.      Heart sounds: Normal heart sounds.   Pulmonary:      Effort: Pulmonary effort is normal.      Breath sounds: Normal breath sounds.   Abdominal:      General: Bowel sounds are normal.      Palpations: Abdomen is soft.      Tenderness: There is abdominal tenderness in the left upper  quadrant. There is no guarding or rebound.   Skin:     General: Skin is warm and dry.   Neurological:      General: No focal deficit present.      Mental Status: She is alert and oriented to person, place, and time.   Psychiatric:         Mood and Affect: Mood normal.         Behavior: Behavior normal.         Procedures           ED Course                                                     Lab Results (last 24 hours)       Procedure Component Value Units Date/Time    CBC & Differential [[id number]]  (Abnormal) Collected: 05/03/25 1300    Specimen: Blood Updated: 05/03/25 1306    Narrative:      The following orders were created for panel order CBC & Differential.  Procedure                               Abnormality         Status                     ---------                               -----------         ------                     CBC Auto Differential[[id number]]        Abnormal            Final result                 Please view results for these tests on the individual orders.    Comprehensive Metabolic Panel [[id number]]  (Abnormal) Collected: 05/03/25 1300    Specimen: Blood Updated: 05/03/25 1324     Glucose 90 mg/dL      BUN 14 mg/dL      Creatinine 0.88 mg/dL      Sodium 141 mmol/L      Potassium 3.8 mmol/L      Chloride 108 mmol/L      CO2 23.0 mmol/L      Calcium 9.0 mg/dL      Total Protein 7.0 g/dL      Albumin 3.9 g/dL      ALT (SGPT) 15 U/L      AST (SGOT) 15 U/L      Alkaline Phosphatase 87 U/L      Total Bilirubin 0.5 mg/dL      Globulin 3.1 gm/dL      A/G Ratio 1.3 g/dL      BUN/Creatinine Ratio 15.9     Anion Gap 10.0 mmol/L      eGFR 81.2 mL/min/1.73     Narrative:      GFR Categories in Chronic Kidney Disease (CKD)              GFR Category          GFR (mL/min/1.73)    Interpretation  G1                    90 or greater        Normal or high (1)  G2                    60-89                Mild decrease (1)  G3a                   45-59                Mild to moderate decrease  G3b                    30-44                Moderate to severe decrease  G4                    15-29                Severe decrease  G5                    14 or less           Kidney failure    (1)In the absence of evidence of kidney disease, neither GFR category G1 or G2 fulfill the criteria for CKD.    eGFR calculation 2021 CKD-EPI creatinine equation, which does not include race as a factor    Protime-INR [[id number]]  (Abnormal) Collected: 05/03/25 1300    Specimen: Blood Updated: 05/03/25 1316     Protime 17.1 Seconds      INR 1.32    Lipase [[id number]]  (Abnormal) Collected: 05/03/25 1300    Specimen: Blood Updated: 05/03/25 1319     Lipase 66 U/L     aPTT [[id number]]  (Abnormal) Collected: 05/03/25 1300    Specimen: Blood Updated: 05/03/25 1316     PTT 40.5 seconds     Narrative:      PTT = The equivalent PTT values for the therapeutic range of heparin levels at 0.3 to 0.7 U/ml are 77 - 99 seconds.    Magnesium [[id number]]  (Normal) Collected: 05/03/25 1300    Specimen: Blood Updated: 05/03/25 1324     Magnesium 2.1 mg/dL     Lactic Acid, Plasma [[id number]]  (Normal) Collected: 05/03/25 1300    Specimen: Blood Updated: 05/03/25 1320     Lactate 1.2 mmol/L     CBC Auto Differential [[id number]]  (Abnormal) Collected: 05/03/25 1300    Specimen: Blood Updated: 05/03/25 1306     WBC 6.78 10*3/mm3      RBC 4.42 10*6/mm3      Hemoglobin 12.9 g/dL      Hematocrit 40.7 %      MCV 92.1 fL      MCH 29.2 pg      MCHC 31.7 g/dL      RDW 13.4 %      RDW-SD 46.2 fl      MPV 11.4 fL      Platelets 234 10*3/mm3      Neutrophil % 68.0 %      Lymphocyte % 15.5 %      Monocyte % 8.4 %      Eosinophil % 6.8 %      Basophil % 0.7 %      Immature Grans % 0.6 %      Neutrophils, Absolute 4.61 10*3/mm3      Lymphocytes, Absolute 1.05 10*3/mm3      Monocytes, Absolute 0.57 10*3/mm3      Eosinophils, Absolute 0.46 10*3/mm3      Basophils, Absolute 0.05 10*3/mm3      Immature Grans, Absolute 0.04 10*3/mm3      nRBC 0.0 /100 WBC           CT Abdomen Pelvis  With Contrast   Final Result   1. No acute abnormality is seen in the abdomen or pelvis.           This report was signed and finalized on 5/3/2025 2:26 PM by Dr. Kirt Swenson MD.            Medications   sodium chloride 0.9 % flush 10 mL (has no administration in time range)   ondansetron (ZOFRAN) injection 4 mg (4 mg Intravenous Not Given 5/3/25 1308)   sodium chloride 0.9 % bolus 1,000 mL (0 mL Intravenous Stopped 5/3/25 1339)   morphine injection 4 mg (4 mg Intravenous Given 5/3/25 1311)   iopamidol (ISOVUE-300) 61 % injection 100 mL (100 mL Intravenous Given 5/3/25 1340)       Medical Decision Making  I had a discussion with the patient/family regarding diagnosis, diagnostic results, treatment plan, and medications.  The patient/family indicated understanding of these instructions.  I spent adequate time at the bedside prior to discharge necessary to discuss the aftercare instructions, giving patient education, providing explanations of the results of our evaluations/findings, and my decision making to assure that the patient/family understand the plan of care.  Time was allotted to answer questions at that time and throughout the ED course.  Emphasis was placed on timely follow-up after discharge.  I also discussed the potential for the development of an acute emergent condition requiring further evaluation, return to the ER, admission, or even surgical intervention. I discussed that we found nothing during the visit today indicating the need for further ER workup at this time, admission to the hospital, or the presence of an acute unstable medical condition.  I encouraged the patient to return to the emergency department immediately for ANY concerns, worsening, new complaints, or if symptoms persist and unable to seek follow-up in a timely fashion.  The patient/family expressed understanding and agreement with this plan.      Problems Addressed:  Left sided abdominal pain: complicated acute illness or  injury    Amount and/or Complexity of Data Reviewed  Labs: ordered. Decision-making details documented in ED Course.  Radiology: ordered. Decision-making details documented in ED Course.    Risk  Prescription drug management.        Final diagnoses:   Left sided abdominal pain       ED Disposition  ED Disposition       ED Disposition   Discharge    Condition   Stable    Comment   --               Crystal Romero S, APRN  [address] [address]  [phone]    Schedule an appointment as soon as possible for a visit       [facility] EMERGENCY DEPARTMENT  [address] [place] [id number] [phone]    As needed, If symptoms worsen         Medication List      No changes were made to your prescriptions during this visit.            Sean Moore MD  05/03/25 8133

## 2025-05-09 ENCOUNTER — TELEPHONE (OUTPATIENT)
Dept: NEUROLOGY | Facility: CLINIC | Age: 50
End: 2025-05-09

## 2025-05-09 NOTE — TELEPHONE ENCOUNTER
Caller: Sandy Estes    Relationship to patient: Self      Best call back number:   Telephone Information:   Mobile [phone]     Provider: WILDER FUNEZ    Medication PA needed:     Topiramate ER (Trokendi XR) 200 MG capsule sustained-release 24 hr       Reason for call/Prior Auth: PHARMACY ADVISED HER A PRIOR AUTH WAS NEEDED A FEW WEEKS AGO    PT HAS BEEN OUT OF MEDICATION FOR TWO WEEKS NOW

## 2025-05-13 ENCOUNTER — RESULTS FOLLOW-UP (OUTPATIENT)
Dept: ADMINISTRATIVE | Facility: HOSPITAL | Age: 50
End: 2025-05-13
Payer: COMMERCIAL

## 2025-05-13 ENCOUNTER — OFFICE VISIT (OUTPATIENT)
Age: 50
End: 2025-05-13
Payer: COMMERCIAL

## 2025-05-13 ENCOUNTER — HOSPITAL ENCOUNTER (OUTPATIENT)
Dept: MRI IMAGING | Facility: HOSPITAL | Age: 50
Discharge: HOME | End: 2025-05-13
Admitting: NURSE PRACTITIONER
Payer: COMMERCIAL

## 2025-05-13 VITALS
OXYGEN SATURATION: 98 % | WEIGHT: 185 LBS | BODY MASS INDEX: 34.93 KG/M2 | SYSTOLIC BLOOD PRESSURE: 122 MMHG | HEART RATE: 57 BPM | TEMPERATURE: 97.8 F | HEIGHT: 61 IN | DIASTOLIC BLOOD PRESSURE: 86 MMHG

## 2025-05-13 DIAGNOSIS — R11.0 NAUSEA: ICD-10-CM

## 2025-05-13 DIAGNOSIS — K86.89 OTHER SPECIFIED DISEASES OF PANCREAS: ICD-10-CM

## 2025-05-13 DIAGNOSIS — E55.9 VITAMIN D DEFICIENCY: ICD-10-CM

## 2025-05-13 DIAGNOSIS — R10.12 LEFT UPPER QUADRANT ABDOMINAL PAIN: Primary | ICD-10-CM

## 2025-05-13 PROCEDURE — 1125F AMNT PAIN NOTED PAIN PRSNT: CPT | Performed by: NURSE PRACTITIONER

## 2025-05-13 PROCEDURE — 74183 MRI ABD W/O CNTR FLWD CNTR: CPT

## 2025-05-13 PROCEDURE — 1160F RVW MEDS BY RX/DR IN RCRD: CPT | Performed by: NURSE PRACTITIONER

## 2025-05-13 PROCEDURE — 99213 OFFICE O/P EST LOW 20 MIN: CPT | Performed by: NURSE PRACTITIONER

## 2025-05-13 PROCEDURE — 1159F MED LIST DOCD IN RCRD: CPT | Performed by: NURSE PRACTITIONER

## 2025-05-13 PROCEDURE — 3074F SYST BP LT 130 MM HG: CPT | Performed by: NURSE PRACTITIONER

## 2025-05-13 PROCEDURE — A9579 GAD-BASE MR CONTRAST NOS,1ML: HCPCS | Performed by: NURSE PRACTITIONER

## 2025-05-13 PROCEDURE — 3079F DIAST BP 80-89 MM HG: CPT | Performed by: NURSE PRACTITIONER

## 2025-05-13 PROCEDURE — 25510000001 GADOPICLENOL 0.5 MMOL/ML SOLUTION: Performed by: NURSE PRACTITIONER

## 2025-05-13 RX ORDER — PROMETHAZINE HYDROCHLORIDE 25 MG/1
25 TABLET ORAL EVERY 6 HOURS PRN
Qty: 15 TABLET | Refills: 1 | Status: SHIPPED | OUTPATIENT
Start: 2025-05-13

## 2025-05-13 RX ORDER — ERGOCALCIFEROL 1.25 MG/1
50000 CAPSULE, LIQUID FILLED ORAL 2 TIMES WEEKLY
Qty: 24 CAPSULE | Refills: 1 | Status: SHIPPED | OUTPATIENT
Start: 2025-05-15

## 2025-05-13 RX ORDER — ZOLPIDEM TARTRATE 12.5 MG/1
12.5 TABLET, FILM COATED, EXTENDED RELEASE ORAL
COMMUNITY

## 2025-05-13 RX ADMIN — GADOPICLENOL 9.5 ML: 485.1 INJECTION INTRAVENOUS at 09:00

## 2025-05-13 NOTE — PROGRESS NOTES
Chief Complaint   Patient presents with    Nausea    Abdominal Pain         History:  Sandy Estes is a 50 y.o. female who presents today for evaluation of the above problems.      Abdominal Pain  Patient complains of abdominal pain. The pain is described as sharp and stabbing, and is 6/10 in intensity. The patient is experiencing LUQ pain with radiation to back. Onset was 3 weeks ago. Symptoms have been gradually worsening. Aggravating factors: activity, fatty foods, movement, pressure, and sitting up.  Alleviating factors: none. Associated symptoms: nausea. The patient denies anorexia, arthralagias, belching, chills, constipation, diarrhea, dysuria, fever, flatus, frequency, headache, hematochezia, hematuria, melena, myalgias, sweats, and vomiting.      Allergies   Allergen Reactions    Penicillin G Shortness Of Breath     Other reaction(s): Unknown    Penicillins Shortness Of Breath    Codeine Dizziness and Nausea And Vomiting    Pickled Meat Diarrhea    Viibryd [Vilazodone Hcl] Nausea And Vomiting and Other (See Comments)     fatigue    Vilazodone Nausea And Vomiting    Amoxil [Amoxicillin] Hives    Biaxin [Clarithromycin] Hives    Cefzil [Cefprozil] Hives    Clindamycin/Lincomycin Hives    Doxycycline Hives    Lisinopril Other (See Comments)     Itchy throat, clearing throat a lot with this med    Sulfa Antibiotics Hives    Sulfasalazine Hives    Vancomycin Hives    Zofran [Ondansetron Hcl] Nausea And Vomiting     Past Medical History:   Diagnosis Date    Anxiety     Asthma     Brain tumor, glioma     Optic chiasm    CHF (congestive heart failure)     Depression     GERD (gastroesophageal reflux disease)     Headache     Hyperlipidemia     Hypertension     Migraine     Neurofibromatosis     6 months old dx    GABRIELLE (obstructive sleep apnea) 2/15/2025    Paroxysmal atrial fibrillation 2/15/2025     Past Surgical History:   Procedure Laterality Date    CARDIAC SURGERY      COLONOSCOPY       ENDOSCOPIC FUNCTIONAL SINUS SURGERY (FESS) Bilateral 10/09/2020    Procedure: ENDOSCOPIC FUNCTIONAL SINUS SURGERY W TRACKING, BILATERAL MAXILLARY AND LEFT SPHENOID SINUPLASTY,  RESECT INFERIOR TURBINATES VIA COBLATION;  Surgeon: Bairon Ramirez MD;  Location: [facility];  Service: ENT;  Laterality: Bilateral;    ENDOSCOPY      GALLBLADDER SURGERY      HYSTERECTOMY      OTHER SURGICAL HISTORY      repaired infundibular & valvular PS: res. trivial PS and Mild to Mod. PI (age 8)    PULMONARY VALVE SURGERY  07/12/2024    DR. HDEZ    TONSILLECTOMY AND ADENOIDECTOMY       Family History   Problem Relation Age of Onset    Breast cancer Mother     Neurofibromatosis Mother     Neuropathy Mother     Heart disease Father     Hypertension Father     Diabetes Father     Migraines Father     Neurofibromatosis Maternal Grandfather     Stroke Maternal Grandfather     Dementia Maternal Grandmother       reports that she quit smoking about 12 years ago. Her smoking use included cigarettes. She started smoking about 22 years ago. She has a 5 pack-year smoking history. She has never used smokeless tobacco. She reports that she does not drink alcohol and does not use drugs.      Current Outpatient Medications:     albuterol sulfate  (90 Base) MCG/ACT inhaler, Inhale 2 puffs Every 4 (Four) Hours As Needed for Wheezing., Disp: , Rfl:     clopidogrel (PLAVIX) 75 MG tablet, Take 1 tablet by mouth Daily., Disp: , Rfl:     Eliquis 5 MG tablet tablet, Take 1 tablet by mouth 2 (Two) Times a Day., Disp: , Rfl:     escitalopram (LEXAPRO) 20 MG tablet, Take 1.5 tablets by mouth Daily., Disp: , Rfl:     famotidine (PEPCID) 40 MG tablet, Take 1 tablet by mouth 2 (Two) Times a Day., Disp: , Rfl:     Farxiga 10 MG tablet, Take 10 mg by mouth Daily., Disp: , Rfl:     fenofibrate (TRICOR) 145 MG tablet, Take 1 tablet by mouth Daily., Disp: , Rfl:     ferrous sulfate 325 (65 FE) MG tablet, Take 1 tablet by mouth Daily With Breakfast., Disp:  , Rfl:     galcanezumab-gnlm (Emgality) 120 MG/ML auto-injector pen, Inject 1 mL under the skin into the appropriate area as directed Every 30 (Thirty) Days., Disp: 1 each, Rfl: 5    hydrOXYzine (ATARAX) 50 MG tablet, Take 1 tablet by mouth 2 (Two) Times a Day., Disp: , Rfl:     memantine (NAMENDA) 10 MG tablet, TAKE 1 TABLET BY MOUTH TWICE DAILY, Disp: 180 tablet, Rfl: 0    metoprolol succinate XL (TOPROL-XL) 25 MG 24 hr tablet, Take 1 tablet by mouth Daily. 1/2 TABLET PER DAY, Disp: , Rfl:     montelukast (SINGULAIR) 10 MG tablet, Take 1 tablet by mouth Every Night., Disp: , Rfl:     Rexulti 3 MG tablet, Take 1 tablet by mouth Daily., Disp: , Rfl:     rizatriptan MLT (Maxalt-MLT) 10 MG disintegrating tablet, Place 1 tablet on the tongue Daily As Needed for Migraine. May repeat in 2 hours if needed, Disp: 12 tablet, Rfl: 3    Topiramate ER (Trokendi XR) 200 MG capsule sustained-release 24 hr, Take 1 capsule by mouth Daily., Disp: 90 capsule, Rfl: 1    traMADol (ULTRAM) 50 MG tablet, Take 1 tablet by mouth Every 12 (Twelve) Hours As Needed for Moderate Pain., Disp: , Rfl:     ubrogepant (Ubrelvy) 100 MG tablet, Take 1 tablet by mouth Daily As Needed (migraine)., Disp: 16 tablet, Rfl: 5    [START ON 5/15/2025] vitamin D (ERGOCALCIFEROL) 1.25 MG (96863 UT) capsule capsule, Take 1 capsule by mouth 2 (Two) Times a Week. Sundays, Disp: 24 capsule, Rfl: 1    zolpidem CR (AMBIEN CR) 12.5 MG CR tablet, Take 1 tablet by mouth every night at bedtime., Disp: , Rfl:     cyanocobalamin 1000 MCG/ML injection, Inject 1 mL under the skin into the appropriate area as directed Every 30 (Thirty) Days. (Patient not taking: Reported on 5/13/2025), Disp: , Rfl:     docusate sodium (COLACE) 100 MG capsule, Take 1 capsule by mouth 3 (Three) Times a Day As Needed for Constipation. (Patient not taking: Reported on 5/13/2025), Disp: , Rfl:     promethazine (PHENERGAN) 25 MG tablet, Take 1 tablet by mouth Every 6 (Six) Hours As Needed for  "Nausea or Vomiting., Disp: 15 tablet, Rfl: 1    zolpidem (AMBIEN) 10 MG tablet, Take 1 tablet by mouth At Night As Needed for Sleep. (Patient not taking: Reported on 5/13/2025), Disp: , Rfl:   No current facility-administered medications for this visit.       OBJECTIVE:  Visit Vitals  /86 (BP Location: Left arm, Patient Position: Sitting, Cuff Size: Adult)   Pulse 57   Temp 97.8 °F (36.6 °C) (Temporal)   Ht 154.9 cm (61\")   Wt 83.9 kg (185 lb)   SpO2 98%   BMI 34.96 kg/m²      Estimated body mass index is 34.96 kg/m² as calculated from the following:    Height as of this encounter: 154.9 cm (61\").    Weight as of this encounter: 83.9 kg (185 lb).  BMI is >= 30 and <35. (Class 1 Obesity). The following options were offered after discussion;: exercise counseling/recommendations and nutrition counseling/recommendations    Physical Exam  Vitals and nursing note reviewed.   Constitutional:       Appearance: Normal appearance. She is obese.   HENT:      Head: Normocephalic and atraumatic.      Nose: Nose normal.   Cardiovascular:      Rate and Rhythm: Normal rate and regular rhythm.      Pulses: Normal pulses.      Heart sounds: Murmur heard.   Pulmonary:      Effort: Pulmonary effort is normal.      Breath sounds: Normal breath sounds.   Abdominal:      General: Abdomen is flat. Bowel sounds are normal.      Palpations: Abdomen is soft.      Tenderness: There is abdominal tenderness in the left upper quadrant.   Skin:     General: Skin is warm and dry.      Capillary Refill: Capillary refill takes less than 2 seconds.   Neurological:      General: No focal deficit present.      Mental Status: She is oriented to person, place, and time.   Psychiatric:         Mood and Affect: Mood normal.         Behavior: Behavior normal.         Thought Content: Thought content normal.         Judgment: Judgment normal.         Result Review :  The following data was reviewed by: KEI Mays on 05/13/2025:    Data " reviewed : Radiologic studies prior CT in ER of abdomen and prior US done by me in past.               Assessment/Plan    Diagnoses and all orders for this visit:    1. Left upper quadrant abdominal pain (Primary)    2. Nausea  -     promethazine (PHENERGAN) 25 MG tablet; Take 1 tablet by mouth Every 6 (Six) Hours As Needed for Nausea or Vomiting.  Dispense: 15 tablet; Refill: 1    3. Vitamin D deficiency    Other orders  -     vitamin D (ERGOCALCIFEROL) 1.25 MG (26440 UT) capsule capsule; Take 1 capsule by mouth 2 (Two) Times a Week. Sundays  Dispense: 24 capsule; Refill: 1      Patient presents to the clinic today for results on MRCP and her abdominal pain and nausea. Her MRCP was performed today so no results at time of visit yet and told her I would let her know when they are back. At this time her plan of care is to see what the MRCP says. If normal or nothing concerning then would have labs redrawn to check liver levels and then refer her to GI speciality for evaluation of her pain. Recommend low fat diet, avoid fried greasy fatty foods and drink water only to see if helps her pain and only eat small frequent meals nothing large. If worsening then go back to the ER. Refill on phenergan for her request along with vitamin D.     Patient mother was with her in clinic today and they had a bed bug concern in their home and was having active alive insects on their clothes today in the office. Due to this patient was not able to stay for her labs but did have PPE protocol in place and informed Heather Joyce (manager) of this and she let all other parties know that needed to be notified. Will put in her lad orders to go ahead and get those done once she is back in after covering bed bug situation at home.     Education materials and an After Visit Summary were printed and given to the patient at discharge.    Return in about 3 months (around 8/13/2025).

## 2025-05-13 NOTE — TELEPHONE ENCOUNTER
Pt notified- already see's gastro at [facility] so is gonna get an appt over there

## 2025-05-20 ENCOUNTER — HOSPITAL ENCOUNTER (OUTPATIENT)
Dept: CT IMAGING | Facility: HOSPITAL | Age: 50
Discharge: HOME | End: 2025-05-20
Payer: COMMERCIAL

## 2025-05-20 DIAGNOSIS — I48.0 PAF (PAROXYSMAL ATRIAL FIBRILLATION): ICD-10-CM

## 2025-05-20 PROCEDURE — 75572 CT HRT W/3D IMAGE: CPT

## 2025-05-20 PROCEDURE — 25510000001 IOPAMIDOL PER 1 ML: Performed by: STUDENT IN AN ORGANIZED HEALTH CARE EDUCATION/TRAINING PROGRAM

## 2025-05-20 RX ORDER — IOPAMIDOL 755 MG/ML
100 INJECTION, SOLUTION INTRAVASCULAR
Status: COMPLETED | OUTPATIENT
Start: 2025-05-20 | End: 2025-05-20

## 2025-05-20 RX ADMIN — IOPAMIDOL 100 ML: 755 INJECTION, SOLUTION INTRAVENOUS at 15:12

## 2025-06-02 ENCOUNTER — OFFICE VISIT (OUTPATIENT)
Dept: CARDIOLOGY | Facility: CLINIC | Age: 50
End: 2025-06-02
Payer: COMMERCIAL

## 2025-06-02 VITALS
HEIGHT: 61 IN | BODY MASS INDEX: 35.12 KG/M2 | OXYGEN SATURATION: 99 % | SYSTOLIC BLOOD PRESSURE: 108 MMHG | WEIGHT: 186 LBS | DIASTOLIC BLOOD PRESSURE: 76 MMHG | HEART RATE: 65 BPM

## 2025-06-02 DIAGNOSIS — I48.0 PAROXYSMAL ATRIAL FIBRILLATION: Primary | ICD-10-CM

## 2025-06-02 DIAGNOSIS — Q85.00 NEUROFIBROMATOSIS: ICD-10-CM

## 2025-06-02 DIAGNOSIS — Z86.73 HISTORY OF TIA (TRANSIENT ISCHEMIC ATTACK): ICD-10-CM

## 2025-06-02 DIAGNOSIS — I10 HYPERTENSION, UNSPECIFIED TYPE: ICD-10-CM

## 2025-06-02 PROCEDURE — 3074F SYST BP LT 130 MM HG: CPT | Performed by: INTERNAL MEDICINE

## 2025-06-02 PROCEDURE — 99214 OFFICE O/P EST MOD 30 MIN: CPT | Performed by: INTERNAL MEDICINE

## 2025-06-02 PROCEDURE — 3078F DIAST BP <80 MM HG: CPT | Performed by: INTERNAL MEDICINE

## 2025-06-02 NOTE — PROGRESS NOTES
Reason for Visit: Left atrial appendage closure shared decision making.     HPI:  Sandy Estes is a 50 y.o. female is here today for Left atrial appendage closure shared decision making.  She follows with Dr. Tinsley in EP clinic.  Significant cardiac history includes paroxysmal atrial fibrillation, sinus bradycardia, PVCs, persistent left SVC, pulmonic valve stenosis status postsurgical repair in  and transcatheter pulmonary valve replacement in , hypertension, and TIA.  There is concern about her bleeding risk due to neurofibromatosis with intracranial tumors.  She is therefore felt to be a poor candidate for long-term anticoagulation and is being worked up for alternative means of thromboembolic prophylaxis with left atrial appendage occlusion with the Watchman device.  She is planning on this at the same time as the ablation.       Patient Active Problem List   Diagnosis    Anxiety    Depression    Neurofibromatosis    Hypertension    Hyperlipidemia    Chronic back pain    Chronic daily headache    Migraine with aura and without status migrainosus, not intractable    Low-grade optic pathway glioma    BMI 38.0-38.9,adult    Allergic rhinitis with postnasal drip    S/P sinus surgery    Adult congenital heart disease    Palpitations    Bradycardia, drug induced    History of pulmonic valve replacement, [place]    GABRIELLE (obstructive sleep apnea)    Paroxysmal atrial fibrillation       Social History     Tobacco Use    Smoking status: Former     Current packs/day: 0.00     Average packs/day: 0.5 packs/day for 10.0 years (5.0 ttl pk-yrs)     Types: Cigarettes     Start date: 2003     Quit date: 2013     Years since quittin.4    Smokeless tobacco: Never   Vaping Use    Vaping status: Former   Substance Use Topics    Alcohol use: No    Drug use: Never       Family History   Problem Relation Age of Onset    Breast cancer Mother     Neurofibromatosis Mother     Neuropathy Mother     Anemia  Mother     Heart failure Mother     Hypertension Mother     Anxiety disorder Mother     Arthritis Mother     Cancer Mother     Depression Mother     Stroke Mother     Heart disease Father     Hypertension Father     Diabetes Father     Migraines Father     Anemia Father     Arrhythmia Father     Heart attack Father     Anxiety disorder Father     Depression Father     Neurofibromatosis Maternal Grandfather     Stroke Maternal Grandfather     Anemia Maternal Grandfather     Arrhythmia Maternal Grandfather     Heart attack Maternal Grandfather     Heart failure Maternal Grandfather     Hypertension Maternal Grandfather     Cancer Maternal Grandfather     Kidney disease Maternal Grandfather     Dementia Maternal Grandmother     Anemia Maternal Grandmother     Arthritis Maternal Grandmother     Mental illness Maternal Grandmother     Thyroid disease Maternal Grandmother     Vision loss Maternal Grandmother     Dementia Paternal Grandmother     Heart failure Paternal Grandmother     Hypertension Paternal Grandmother     Asthma Brother     Heart attack Paternal Uncle        The following portions of the patient's history were reviewed and updated as appropriate: allergies, current medications, past family history, past medical history, past social history, past surgical history, and problem list.      Current Outpatient Medications:     albuterol sulfate  (90 Base) MCG/ACT inhaler, Inhale 2 puffs Every 4 (Four) Hours As Needed for Wheezing., Disp: , Rfl:     clopidogrel (PLAVIX) 75 MG tablet, Take 1 tablet by mouth Daily., Disp: , Rfl:     Eliquis 5 MG tablet tablet, Take 1 tablet by mouth 2 (Two) Times a Day., Disp: , Rfl:     escitalopram (LEXAPRO) 20 MG tablet, Take 1.5 tablets by mouth Daily., Disp: , Rfl:     famotidine (PEPCID) 40 MG tablet, Take 1 tablet by mouth 2 (Two) Times a Day., Disp: , Rfl:     Farxiga 10 MG tablet, Take 10 mg by mouth Daily., Disp: , Rfl:     fenofibrate (TRICOR) 145 MG tablet, Take  1 tablet by mouth Daily., Disp: , Rfl:     ferrous sulfate 325 (65 FE) MG tablet, Take 1 tablet by mouth Daily With Breakfast., Disp: , Rfl:     galcanezumab-gnlm (Emgality) 120 MG/ML auto-injector pen, Inject 1 mL under the skin into the appropriate area as directed Every 30 (Thirty) Days., Disp: 1 each, Rfl: 5    hydrOXYzine (ATARAX) 50 MG tablet, Take 1 tablet by mouth 2 (Two) Times a Day., Disp: , Rfl:     memantine (NAMENDA) 10 MG tablet, TAKE 1 TABLET BY MOUTH TWICE DAILY, Disp: 180 tablet, Rfl: 0    metoprolol succinate XL (TOPROL-XL) 25 MG 24 hr tablet, Take 1 tablet by mouth Daily. 1/2 TABLET PER DAY, Disp: , Rfl:     montelukast (SINGULAIR) 10 MG tablet, Take 1 tablet by mouth Every Night., Disp: , Rfl:     promethazine (PHENERGAN) 25 MG tablet, Take 1 tablet by mouth Every 6 (Six) Hours As Needed for Nausea or Vomiting., Disp: 15 tablet, Rfl: 1    Rexulti 3 MG tablet, Take 1 tablet by mouth Daily., Disp: , Rfl:     rizatriptan MLT (Maxalt-MLT) 10 MG disintegrating tablet, Place 1 tablet on the tongue Daily As Needed for Migraine. May repeat in 2 hours if needed, Disp: 12 tablet, Rfl: 3    Topiramate ER (Trokendi XR) 200 MG capsule sustained-release 24 hr, Take 1 capsule by mouth Daily., Disp: 90 capsule, Rfl: 1    traMADol (ULTRAM) 50 MG tablet, Take 1 tablet by mouth Every 12 (Twelve) Hours As Needed for Moderate Pain., Disp: , Rfl:     ubrogepant (Ubrelvy) 100 MG tablet, Take 1 tablet by mouth Daily As Needed (migraine)., Disp: 16 tablet, Rfl: 5    vitamin D (ERGOCALCIFEROL) 1.25 MG (39755 UT) capsule capsule, Take 1 capsule by mouth 2 (Two) Times a Week. Sundays, Disp: 24 capsule, Rfl: 1    zolpidem CR (AMBIEN CR) 12.5 MG CR tablet, Take 1 tablet by mouth every night at bedtime., Disp: , Rfl:     Review of Systems   Constitutional: Negative for chills and fever.   Cardiovascular:  Negative for chest pain and paroxysmal nocturnal dyspnea.   Respiratory:  Negative for cough and shortness of breath.   "  Skin:  Negative for rash.   Gastrointestinal:  Negative for abdominal pain and heartburn.   Neurological:  Negative for dizziness and numbness.       Objective   /76 (BP Location: Left arm, Patient Position: Sitting, Cuff Size: Adult)   Pulse 65   Ht 154.9 cm (60.98\")   Wt 84.4 kg (186 lb)   SpO2 99%   BMI 35.16 kg/m²   Constitutional:       Appearance: Well-developed. Obese.   HENT:      Head: Normocephalic and atraumatic.   Pulmonary:      Effort: Pulmonary effort is normal.      Breath sounds: Normal breath sounds.   Cardiovascular:      Normal rate. Regular rhythm.   Edema:     Peripheral edema absent.   Skin:     General: Skin is warm and dry.   Neurological:      Mental Status: Alert and oriented to person, place, and time.       Procedures      ICD-10-CM ICD-9-CM   1. Paroxysmal atrial fibrillation  I48.0 427.31   2. Hypertension, unspecified type  I10 401.9   3. Neurofibromatosis  Q85.00 237.70   4. History of TIA (transient ischemic attack)  Z86.73 V12.54         Assessment/Plan:  Based on the history, it has been determined that the patient is a poor candidate for long-term oral anticoagulation.  The patient may, however, be tolerant to short-term anticoagulation as necessary.    Specifically regarding anticoagulation they have demonstrated: Neurofibromatosis with intracranial tumors    We have discussed that you need stroke and bleeding risk on and off anticoagulation.  The individual [id number] stroke risk store is 4 (female sex, TIA, hypertension), indicating an adjusted stroke rate of 4%/year.    Based on both stroke and bleeding risk, shared decision has been made to pursue closure of the left atrial appendage is a safe, effective alternative to long-term oral anticoagulation therapy for stroke prophylaxis.  This will reduce her long-term risk of incidence of bleeding.  Information regarding left atrial appendage closure and shared decision making were provided to patient.    "

## 2025-07-24 ENCOUNTER — HOSPITAL ENCOUNTER (EMERGENCY)
Facility: HOSPITAL | Age: 50
Discharge: HOME | End: 2025-07-24
Admitting: FAMILY MEDICINE
Payer: COMMERCIAL

## 2025-07-24 ENCOUNTER — APPOINTMENT (OUTPATIENT)
Dept: GENERAL RADIOLOGY | Facility: HOSPITAL | Age: 50
End: 2025-07-24
Payer: COMMERCIAL

## 2025-07-24 VITALS
RESPIRATION RATE: 16 BRPM | HEIGHT: 61 IN | TEMPERATURE: 98.2 F | BODY MASS INDEX: 35.13 KG/M2 | DIASTOLIC BLOOD PRESSURE: 89 MMHG | SYSTOLIC BLOOD PRESSURE: 127 MMHG | OXYGEN SATURATION: 99 % | HEART RATE: 71 BPM | WEIGHT: 186.07 LBS

## 2025-07-24 DIAGNOSIS — R00.2 PALPITATIONS: Primary | ICD-10-CM

## 2025-07-24 LAB
ALBUMIN SERPL-MCNC: 3.8 G/DL (ref 3.5–5.2)
ALBUMIN/GLOB SERPL: 1.3 G/DL
ALP SERPL-CCNC: 72 U/L (ref 39–117)
ALT SERPL W P-5'-P-CCNC: 12 U/L (ref 1–33)
ANION GAP SERPL CALCULATED.3IONS-SCNC: 12 MMOL/L (ref 5–15)
AST SERPL-CCNC: 20 U/L (ref 1–32)
BASOPHILS # BLD MANUAL: 0 10*3/MM3 (ref 0–0.2)
BASOPHILS NFR BLD MANUAL: 0 % (ref 0–1.5)
BILIRUB SERPL-MCNC: 0.5 MG/DL (ref 0–1.2)
BILIRUB UR QL STRIP: NEGATIVE
BUN SERPL-MCNC: 15.2 MG/DL (ref 6–20)
BUN/CREAT SERPL: 20.8 (ref 7–25)
CALCIUM SPEC-SCNC: 9.5 MG/DL (ref 8.6–10.5)
CHLORIDE SERPL-SCNC: 106 MMOL/L (ref 98–107)
CLARITY UR: CLEAR
CLUMPED PLATELETS: PRESENT
CO2 SERPL-SCNC: 23 MMOL/L (ref 22–29)
COLOR UR: YELLOW
CREAT SERPL-MCNC: 0.73 MG/DL (ref 0.57–1)
DEPRECATED RDW RBC AUTO: 44.3 FL (ref 37–54)
EGFRCR SERPLBLD CKD-EPI 2021: 101.6 ML/MIN/1.73
EOSINOPHIL # BLD MANUAL: 0.67 10*3/MM3 (ref 0–0.4)
EOSINOPHIL NFR BLD MANUAL: 10.9 % (ref 0.3–6.2)
ERYTHROCYTE [DISTWIDTH] IN BLOOD BY AUTOMATED COUNT: 13.2 % (ref 12.3–15.4)
GEN 5 1HR TROPONIN T REFLEX: <6 NG/L
GIANT PLATELETS: ABNORMAL
GLOBULIN UR ELPH-MCNC: 2.9 GM/DL
GLUCOSE SERPL-MCNC: 97 MG/DL (ref 65–99)
GLUCOSE UR STRIP-MCNC: ABNORMAL MG/DL
HCT VFR BLD AUTO: 39.3 % (ref 34–46.6)
HGB BLD-MCNC: 12.5 G/DL (ref 12–15.9)
HGB UR QL STRIP.AUTO: NEGATIVE
HOLD SPECIMEN: NORMAL
HOLD SPECIMEN: NORMAL
KETONES UR QL STRIP: NEGATIVE
LEUKOCYTE ESTERASE UR QL STRIP.AUTO: NEGATIVE
LYMPHOCYTES # BLD MANUAL: 1.1 10*3/MM3 (ref 0.7–3.1)
LYMPHOCYTES NFR BLD MANUAL: 5.9 % (ref 5–12)
MAGNESIUM SERPL-MCNC: 2 MG/DL (ref 1.6–2.6)
MCH RBC QN AUTO: 28.9 PG (ref 26.6–33)
MCHC RBC AUTO-ENTMCNC: 31.8 G/DL (ref 31.5–35.7)
MCV RBC AUTO: 91 FL (ref 79–97)
MONOCYTES # BLD: 0.36 10*3/MM3 (ref 0.1–0.9)
NEUTROPHILS # BLD AUTO: 4 10*3/MM3 (ref 1.7–7)
NEUTROPHILS NFR BLD MANUAL: 63.4 % (ref 42.7–76)
NEUTS BAND NFR BLD MANUAL: 2 % (ref 0–5)
NITRITE UR QL STRIP: NEGATIVE
PH UR STRIP.AUTO: 5.5 [PH] (ref 5–8)
PLATELET # BLD AUTO: 211 10*3/MM3 (ref 140–450)
PMV BLD AUTO: 12.3 FL (ref 6–12)
POLYCHROMASIA BLD QL SMEAR: ABNORMAL
POTASSIUM SERPL-SCNC: 3.7 MMOL/L (ref 3.5–5.2)
PROT SERPL-MCNC: 6.7 G/DL (ref 6–8.5)
PROT UR QL STRIP: NEGATIVE
QT INTERVAL: 502 MS
QTC INTERVAL: 480 MS
RBC # BLD AUTO: 4.32 10*6/MM3 (ref 3.77–5.28)
SODIUM SERPL-SCNC: 141 MMOL/L (ref 136–145)
SP GR UR STRIP: 1.03 (ref 1–1.03)
TROPONIN T NUMERIC DELTA: NORMAL
TROPONIN T SERPL HS-MCNC: <6 NG/L
TSH SERPL DL<=0.05 MIU/L-ACNC: 2.61 UIU/ML (ref 0.27–4.2)
UROBILINOGEN UR QL STRIP: ABNORMAL
VARIANT LYMPHS NFR BLD MANUAL: 13.9 % (ref 19.6–45.3)
VARIANT LYMPHS NFR BLD MANUAL: 4 % (ref 0–5)
WBC MORPH BLD: NORMAL
WBC NRBC COR # BLD AUTO: 6.12 10*3/MM3 (ref 3.4–10.8)
WHOLE BLOOD HOLD COAG: NORMAL
WHOLE BLOOD HOLD SPECIMEN: NORMAL

## 2025-07-24 PROCEDURE — 84484 ASSAY OF TROPONIN QUANT: CPT

## 2025-07-24 PROCEDURE — 81003 URINALYSIS AUTO W/O SCOPE: CPT

## 2025-07-24 PROCEDURE — 93005 ELECTROCARDIOGRAM TRACING: CPT

## 2025-07-24 PROCEDURE — 99284 EMERGENCY DEPT VISIT MOD MDM: CPT

## 2025-07-24 PROCEDURE — 36415 COLL VENOUS BLD VENIPUNCTURE: CPT

## 2025-07-24 PROCEDURE — 71045 X-RAY EXAM CHEST 1 VIEW: CPT

## 2025-07-24 PROCEDURE — 80050 GENERAL HEALTH PANEL: CPT

## 2025-07-24 PROCEDURE — 85007 BL SMEAR W/DIFF WBC COUNT: CPT

## 2025-07-24 PROCEDURE — 83735 ASSAY OF MAGNESIUM: CPT

## 2025-07-24 RX ORDER — ESKETAMINE HYDROCHLORIDE 28 MG/.2ML
56 SOLUTION NASAL 2 TIMES WEEKLY
COMMUNITY
Start: 2025-07-23

## 2025-07-24 RX ORDER — SODIUM CHLORIDE 0.9 % (FLUSH) 0.9 %
10 SYRINGE (ML) INJECTION AS NEEDED
Status: DISCONTINUED | OUTPATIENT
Start: 2025-07-24 | End: 2025-07-24 | Stop reason: HOSPADM

## 2025-07-24 NOTE — ED PROVIDER NOTES
Subjective   History of Present Illness 45-year-old female presents to the ER with complaint of irregular heart rate like palpitations that began at 3:30 PM today.  Patient has a history of atrial fibrillation and she is scheduled to have cardiac ablation and a Watchman device placed.  She had her primary heart valve replaced last July.  Patient has a history of congestive heart failure but takes metoprolol and not Lasix.  No overt chest pain or shortness of breath with this today.  History of paroxysmal atrial fibrillation, obstructive sleep apnea, TIA, obesity, neurofibromatosis, migraines, low back pain, hypertension, hyperlipidemia, heart murmur, headaches, reflux, depression, coronary disease, congenital heart disease, cholelithiasis, brain tumor (glioma).    Review of Systems denies any fever chills cough congestion chest pain or shortness of breath.  No nausea vomiting diarrhea or abdominal pain.    Past Medical History:   Diagnosis Date    Abnormal ECG     Allergic     Anxiety     Asthma     Brain tumor, glioma     Optic chiasm    CHF (congestive heart failure)     Cholelithiasis 2005    Congenital heart disease     Coronary artery disease     Depression     GERD (gastroesophageal reflux disease)     Headache     Heart murmur 1977    Hyperlipidemia     Hypertension     Kidney stone 2025    Low back pain     Migraine     Neurofibromatosis     6 months old dx    Obesity     GABRIELLE (obstructive sleep apnea) 02/15/2025    Paroxysmal atrial fibrillation 02/15/2025    Scoliosis     TIA (transient ischemic attack)        Allergies   Allergen Reactions    Penicillin G Shortness Of Breath     Other reaction(s): Unknown    Penicillins Shortness Of Breath    Codeine Dizziness and Nausea And Vomiting    Pickled Meat Diarrhea    Viibryd [Vilazodone Hcl] Nausea And Vomiting and Other (See Comments)     fatigue    Vilazodone Nausea And Vomiting    Amoxil [Amoxicillin] Hives    Biaxin [Clarithromycin] Hives    Cefzil  [Cefprozil] Hives    Clindamycin/Lincomycin Hives    Doxycycline Hives    Lisinopril Other (See Comments)     Itchy throat, clearing throat a lot with this med    Sulfa Antibiotics Hives    Sulfasalazine Hives    Vancomycin Hives    Zofran [Ondansetron Hcl] Nausea And Vomiting       Past Surgical History:   Procedure Laterality Date    CARDIAC CATHETERIZATION  2024    CARDIAC SURGERY      CARDIAC VALVE REPLACEMENT       SECTION      CHOLECYSTECTOMY      COLONOSCOPY      ENDOSCOPIC FUNCTIONAL SINUS SURGERY (FESS) Bilateral 10/09/2020    Procedure: ENDOSCOPIC FUNCTIONAL SINUS SURGERY W TRACKING, BILATERAL MAXILLARY AND LEFT SPHENOID SINUPLASTY,  RESECT INFERIOR TURBINATES VIA COBLATION;  Surgeon: Bairon Ramirez MD;  Location: [facility] OR;  Service: ENT;  Laterality: Bilateral;    ENDOSCOPY      GALLBLADDER SURGERY      HYSTERECTOMY      OTHER SURGICAL HISTORY      repaired infundibular & valvular PS: res. trivial PS and Mild to Mod. PI (age 8)    PULMONARY VALVE SURGERY  2024    DR. HDEZ    SINUS SURGERY      TONSILLECTOMY AND ADENOIDECTOMY         Family History   Problem Relation Age of Onset    Breast cancer Mother     Neurofibromatosis Mother     Neuropathy Mother     Anemia Mother     Heart failure Mother     Hypertension Mother     Anxiety disorder Mother     Arthritis Mother     Cancer Mother     Depression Mother     Stroke Mother     Heart disease Father     Hypertension Father     Diabetes Father     Migraines Father     Anemia Father     Arrhythmia Father     Heart attack Father     Anxiety disorder Father     Depression Father     Neurofibromatosis Maternal Grandfather     Stroke Maternal Grandfather     Anemia Maternal Grandfather     Arrhythmia Maternal Grandfather     Heart attack Maternal Grandfather     Heart failure Maternal Grandfather     Hypertension Maternal Grandfather     Cancer Maternal Grandfather     Kidney disease Maternal Grandfather     Dementia  Maternal Grandmother     Anemia Maternal Grandmother     Arthritis Maternal Grandmother     Mental illness Maternal Grandmother     Thyroid disease Maternal Grandmother     Vision loss Maternal Grandmother     Dementia Paternal Grandmother     Heart failure Paternal Grandmother     Hypertension Paternal Grandmother     Asthma Brother     Heart attack Paternal Uncle        Social History     Socioeconomic History    Marital status: Single   Tobacco Use    Smoking status: Former     Current packs/day: 0.00     Average packs/day: 0.5 packs/day for 10.0 years (5.0 ttl pk-yrs)     Types: Cigarettes     Start date: 2003     Quit date: 2013     Years since quittin.5    Smokeless tobacco: Never   Vaping Use    Vaping status: Former   Substance and Sexual Activity    Alcohol use: No    Drug use: Never    Sexual activity: Not Currently     Birth control/protection: Hysterectomy           Objective   Physical Exam  HEENT: EOMI PERRL, nares patent, mucous membranes moist  Neck: No lymphadenopathy  Chest: Clear to auscultation bilaterally without wheezing rhonchi or rales.  Cardiovascular: Regular rate rhythm  Abdomen: Soft nontender good bowel sounds no hernia no CVA tenderness  Musculoskeletal: Full active range of motion with good distal pulses  Skin: No rash  Neuro: Alert and orient x 3, no acute distress.    Procedures           ED Course  ED Course as of 25   Thu  ECG 12 Lead Rhythm Change [SB]      ED Course User Index  [SB] Max Mazariegos PA-C                                           CBC: White blood cell count 6.12, hemoglobin 12.5, hematocrit 39.3, platelets are 211.  CMP: Renal functions: BUN 15.2, creatinine 0.73, .6.  Electrolytes: Sodium 141, potassium 3.7, chloride 106, bicarb 23, anion gap 12.  Liver functions: ALT 12, AST 20, alk phos 72, total bilirubin 0.5.  Other: Glucose 97, calcium 9.5, total protein 6.7, albumin 3.8, globulin 2.9  First troponin less  than 0.6  Second troponin less than 6  Magnesium 2  Urinalysis: Glucose greater than 1000, urobilinogen 2.0  EKG: Heart rate 55, sinus bradycardia, prolonged QT (improved from EKG 5 months ago.  Chest x-ray is negative  TSH: 2.610  Chest x-ray IMPRESSION:  No acute cardiopulmonary abnormality.    Workup today is negative for this patient.  I spoke with her and she states that she has a follow-up once they call her for the heart ablation and Watchman procedure.  She otherwise is feeling fine right now.  I spoke with Dr. Moore who agrees with our treatment and discharge plan.            Medical Decision Making  Problems Addressed:  Palpitations: acute illness or injury    Amount and/or Complexity of Data Reviewed  ECG/medicine tests:  Decision-making details documented in ED Course.        Final diagnoses:   Palpitations       ED Disposition  ED Disposition       ED Disposition   Discharge    Condition   Stable    Comment   --               Hector Bojorquez MD  [address]  [address]  [facility] [id number] [phone]    Call   Call to schedule follow-up appointment    Amber Tinsley MD  [address]   [address]  [facility] [id number] [phone]    Call   Call to schedule follow-up appointment         Medication List      No changes were made to your prescriptions during this visit.            Max Mazariegos PA-C  07/24/25 [id number]

## 2025-07-25 NOTE — DISCHARGE INSTRUCTIONS
Continue with current medications as prescribed.  Make sure you are you follow-up with Dr. Tinsley and Dr. Bojorquez.  Return to ER if good condition would worsen.

## 2025-08-06 LAB
QT INTERVAL: 502 MS
QTC INTERVAL: 480 MS

## 2025-08-13 ENCOUNTER — TELEPHONE (OUTPATIENT)
Age: 50
End: 2025-08-13

## 2025-08-14 RX ORDER — CLOPIDOGREL BISULFATE 75 MG/1
75 TABLET ORAL DAILY
Qty: 90 TABLET | Refills: 0 | Status: SHIPPED | OUTPATIENT
Start: 2025-08-14

## 2025-08-14 RX ORDER — FENOFIBRATE 145 MG/1
145 TABLET, FILM COATED ORAL DAILY
Qty: 90 TABLET | Refills: 0 | Status: SHIPPED | OUTPATIENT
Start: 2025-08-14

## 2025-08-14 RX ORDER — FERROUS SULFATE 325(65) MG
325 TABLET ORAL
Qty: 90 TABLET | Refills: 0 | Status: SHIPPED | OUTPATIENT
Start: 2025-08-14

## 2025-08-20 ENCOUNTER — TELEPHONE (OUTPATIENT)
Dept: CARDIOLOGY | Facility: CLINIC | Age: 50
End: 2025-08-20
Payer: COMMERCIAL

## 2025-08-21 ENCOUNTER — TELEMEDICINE (OUTPATIENT)
Age: 50
End: 2025-08-21
Payer: COMMERCIAL

## 2025-08-21 DIAGNOSIS — R73.03 PREDIABETES: Primary | ICD-10-CM

## 2025-08-21 DIAGNOSIS — R21 RASH: ICD-10-CM

## 2025-08-21 RX ORDER — TRIAMCINOLONE ACETONIDE 1 MG/G
1 OINTMENT TOPICAL 2 TIMES DAILY
Qty: 30 G | Refills: 0 | Status: SHIPPED | OUTPATIENT
Start: 2025-08-21

## 2025-08-22 ENCOUNTER — NURSE TRIAGE (OUTPATIENT)
Dept: CALL CENTER | Facility: HOSPITAL | Age: 50
End: 2025-08-22
Payer: COMMERCIAL

## 2025-08-25 ENCOUNTER — TELEPHONE (OUTPATIENT)
Age: 50
End: 2025-08-25
Payer: COMMERCIAL

## 2025-08-25 DIAGNOSIS — R73.03 PREDIABETES: ICD-10-CM

## 2025-08-27 ENCOUNTER — TELEPHONE (OUTPATIENT)
Age: 50
End: 2025-08-27
Payer: COMMERCIAL

## 2025-08-27 RX ORDER — LANCING DEVICE
1 EACH MISCELLANEOUS DAILY
Qty: 100 EACH | Refills: 3 | Status: SHIPPED | OUTPATIENT
Start: 2025-08-27

## 2025-08-27 RX ORDER — DIPHENHYDRAMINE HYDROCHLORIDE 25 MG/1
1 CAPSULE, LIQUID FILLED ORAL CONTINUOUS
Qty: 1 EACH | Refills: 0 | Status: SHIPPED | OUTPATIENT
Start: 2025-08-27

## (undated) DEVICE — ENDO KIT,LOURDES HOSPITAL: Brand: MEDLINE INDUSTRIES, INC.

## (undated) DEVICE — BRUSH ENDOSCP 2 END CHN HEDGEHOG

## (undated) DEVICE — FORCEPS BX 240CM 2.4MM L NDL RAD JAW 4 M00513334

## (undated) DEVICE — SPONGE,NEURO,0.5"X3",XR,STRL,LF,10/PK: Brand: MEDLINE

## (undated) DEVICE — SUT GUT CHRM 4/0 P3 18IN 1654G

## (undated) DEVICE — WIPE MEROCEL 3.625X3IN

## (undated) DEVICE — PK ENT HD AND NK 30

## (undated) DEVICE — KT ANTI FOG W/FLD AND SPNG

## (undated) DEVICE — BITE BLOCK ENDOSCP AD 60 FR W/ ADJ STRP PLAS GRN BLOX

## (undated) DEVICE — PK TURNOVER RM ADV

## (undated) DEVICE — ADAPTER CLEANING PORPOISE CLEANING

## (undated) DEVICE — RADIFOCUS OPTITORQUE ANGIOGRAPHIC CATHETER: Brand: OPTITORQUE

## (undated) DEVICE — COLLECTION SOCK, GENERAL: Brand: DEROYAL

## (undated) DEVICE — SINU FOAM: Brand: SINU-FOAM

## (undated) DEVICE — TUBING, SUCTION, 1/4" X 12', STRAIGHT: Brand: MEDLINE

## (undated) DEVICE — CANNULA NSL AD L7FT DIV O2 CO2 W/ M LUERLOCK TRMPT CONN

## (undated) DEVICE — SYSTEM VENT MED AD NASAL PAP SUPERNO2VA

## (undated) DEVICE — CANISTER, RIGID, 2000CC: Brand: MEDLINE INDUSTRIES, INC.

## (undated) DEVICE — NDL HYPO PRECISIONGLIDE/REG 18G 11/2 PNK

## (undated) DEVICE — YANKAUER,TAPERED BULBOUS TIP,VENTED: Brand: MEDLINE

## (undated) DEVICE — TUBING 1895522 5PK STRAIGHTSHOT TO XPS: Brand: STRAIGHTSHOT®

## (undated) DEVICE — BLADE 1884012EM RAD12 4MM M4 ROTATE ROHS: Brand: FUSION®

## (undated) DEVICE — INSTR TRACKER 9733533 EM ENT

## (undated) DEVICE — SINGLE PORT MANIFOLD: Brand: NEPTUNE 2

## (undated) DEVICE — PATIENT TRACKER 9734887 NON-INVASIVE

## (undated) DEVICE — GLV SURG BIOGEL M LTX PF 7 1/2

## (undated) DEVICE — NASAL EPISTAXIS 2 PACK: Brand: XEROGEL

## (undated) DEVICE — SUT GUT PLN 4/0 P3 18IN 1644G

## (undated) DEVICE — SPONGE ENDOSCP CLN BS INF PREVENTION KOALA